# Patient Record
Sex: MALE | Race: WHITE | NOT HISPANIC OR LATINO
[De-identification: names, ages, dates, MRNs, and addresses within clinical notes are randomized per-mention and may not be internally consistent; named-entity substitution may affect disease eponyms.]

---

## 2017-01-26 ENCOUNTER — RESULT REVIEW (OUTPATIENT)
Age: 72
End: 2017-01-26

## 2017-03-02 ENCOUNTER — EMERGENCY (EMERGENCY)
Facility: HOSPITAL | Age: 72
LOS: 1 days | Discharge: HOME | End: 2017-03-02
Admitting: EMERGENCY MEDICINE
Payer: COMMERCIAL

## 2017-03-02 PROCEDURE — 71045 X-RAY EXAM CHEST 1 VIEW: CPT

## 2017-03-02 PROCEDURE — 85027 COMPLETE CBC AUTOMATED: CPT

## 2017-03-02 PROCEDURE — 84484 ASSAY OF TROPONIN QUANT: CPT

## 2017-03-02 PROCEDURE — 99284 EMERGENCY DEPT VISIT MOD MDM: CPT | Mod: 25

## 2017-03-02 PROCEDURE — 83880 ASSAY OF NATRIURETIC PEPTIDE: CPT

## 2017-03-02 PROCEDURE — 93005 ELECTROCARDIOGRAM TRACING: CPT

## 2017-03-02 PROCEDURE — 85610 PROTHROMBIN TIME: CPT

## 2017-03-02 PROCEDURE — 85730 THROMBOPLASTIN TIME PARTIAL: CPT

## 2017-03-02 PROCEDURE — 93010 ELECTROCARDIOGRAM REPORT: CPT

## 2017-03-02 PROCEDURE — 71010: CPT | Mod: 26

## 2017-03-02 PROCEDURE — 80053 COMPREHEN METABOLIC PANEL: CPT

## 2017-03-02 PROCEDURE — 82550 ASSAY OF CK (CPK): CPT

## 2017-03-02 PROCEDURE — 96360 HYDRATION IV INFUSION INIT: CPT

## 2017-03-02 PROCEDURE — 70450 CT HEAD/BRAIN W/O DYE: CPT | Mod: 26

## 2017-03-02 PROCEDURE — 70450 CT HEAD/BRAIN W/O DYE: CPT

## 2017-03-02 PROCEDURE — 99285 EMERGENCY DEPT VISIT HI MDM: CPT

## 2017-03-02 PROCEDURE — 84443 ASSAY THYROID STIM HORMONE: CPT

## 2018-09-07 ENCOUNTER — RESULT REVIEW (OUTPATIENT)
Age: 73
End: 2018-09-07

## 2018-10-24 ENCOUNTER — OUTPATIENT (OUTPATIENT)
Dept: OUTPATIENT SERVICES | Facility: HOSPITAL | Age: 73
LOS: 1 days | End: 2018-10-24
Payer: COMMERCIAL

## 2018-10-24 ENCOUNTER — APPOINTMENT (OUTPATIENT)
Dept: MRI IMAGING | Facility: CLINIC | Age: 73
End: 2018-10-24
Payer: COMMERCIAL

## 2018-10-24 DIAGNOSIS — M54.14 RADICULOPATHY, THORACIC REGION: ICD-10-CM

## 2018-10-24 PROCEDURE — 72146 MRI CHEST SPINE W/O DYE: CPT

## 2018-10-24 PROCEDURE — 72146 MRI CHEST SPINE W/O DYE: CPT | Mod: 26

## 2019-09-16 ENCOUNTER — RX RENEWAL (OUTPATIENT)
Age: 74
End: 2019-09-16

## 2020-01-09 ENCOUNTER — RX RENEWAL (OUTPATIENT)
Age: 75
End: 2020-01-09

## 2021-01-02 ENCOUNTER — RX RENEWAL (OUTPATIENT)
Age: 76
End: 2021-01-02

## 2021-01-25 ENCOUNTER — APPOINTMENT (OUTPATIENT)
Dept: GASTROENTEROLOGY | Facility: CLINIC | Age: 76
End: 2021-01-25
Payer: COMMERCIAL

## 2021-01-25 VITALS
HEART RATE: 97 BPM | HEIGHT: 69.5 IN | DIASTOLIC BLOOD PRESSURE: 81 MMHG | SYSTOLIC BLOOD PRESSURE: 141 MMHG | WEIGHT: 178 LBS | BODY MASS INDEX: 25.77 KG/M2

## 2021-01-25 DIAGNOSIS — Z80.9 FAMILY HISTORY OF MALIGNANT NEOPLASM, UNSPECIFIED: ICD-10-CM

## 2021-01-25 DIAGNOSIS — Z86.39 PERSONAL HISTORY OF OTHER ENDOCRINE, NUTRITIONAL AND METABOLIC DISEASE: ICD-10-CM

## 2021-01-25 DIAGNOSIS — K30 FUNCTIONAL DYSPEPSIA: ICD-10-CM

## 2021-01-25 DIAGNOSIS — Z82.49 FAMILY HISTORY OF ISCHEMIC HEART DISEASE AND OTHER DISEASES OF THE CIRCULATORY SYSTEM: ICD-10-CM

## 2021-01-25 PROCEDURE — 99215 OFFICE O/P EST HI 40 MIN: CPT

## 2021-01-25 PROCEDURE — 99072 ADDL SUPL MATRL&STAF TM PHE: CPT

## 2021-01-25 NOTE — PHYSICAL EXAM
[General Appearance - Alert] : alert [General Appearance - In No Acute Distress] : in no acute distress [Sclera] : the sclera and conjunctiva were normal [PERRL With Normal Accommodation] : pupils were equal in size, round, and reactive to light [Extraocular Movements] : extraocular movements were intact [Auscultation Breath Sounds / Voice Sounds] : lungs were clear to auscultation bilaterally [Heart Rate And Rhythm] : heart rate was normal and rhythm regular [Heart Sounds] : normal S1 and S2 [Heart Sounds Gallop] : no gallops [Murmurs] : no murmurs [Heart Sounds Pericardial Friction Rub] : no pericardial rub [Bowel Sounds] : normal bowel sounds [Abdomen Soft] : soft [] : no hepato-splenomegaly [Abdomen Mass (___ Cm)] : no abdominal mass palpated [FreeTextEntry1] : tender epigastrium [Abnormal Walk] : normal gait [Nail Clubbing] : no clubbing  or cyanosis of the fingernails [Musculoskeletal - Swelling] : no joint swelling seen [Motor Tone] : muscle strength and tone were normal [Oriented To Time, Place, And Person] : oriented to person, place, and time [Impaired Insight] : insight and judgment were intact [Affect] : the affect was normal

## 2021-01-25 NOTE — HISTORY OF PRESENT ILLNESS
[de-identified] : 74yo male with epigastric discomfort, belching\par \par He notes over last few weeks increased epigastric discomfort worse post-prandially with increased belching after nearly every meal.  Pain lasts for a while and is not severe, but definitely uncomfortable.  No specific triggers. Belching lasts for several minutes to half hour and then tends to resolve by itself.\par He takes Omeprazole daily but doesn't seem to help or prevent these symptoms

## 2021-01-25 NOTE — REVIEW OF SYSTEMS
[As Noted in HPI] : as noted in HPI [Abdominal Pain] : abdominal pain [Heartburn] : heartburn [Joint Pain] : joint pain [Negative] : Heme/Lymph

## 2021-01-28 DIAGNOSIS — Z01.818 ENCOUNTER FOR OTHER PREPROCEDURAL EXAMINATION: ICD-10-CM

## 2021-01-29 ENCOUNTER — APPOINTMENT (OUTPATIENT)
Dept: DISASTER EMERGENCY | Facility: CLINIC | Age: 76
End: 2021-01-29

## 2021-01-31 LAB — SARS-COV-2 N GENE NPH QL NAA+PROBE: NOT DETECTED

## 2021-02-01 LAB
ALBUMIN SERPL ELPH-MCNC: 4.3 G/DL
ALP BLD-CCNC: 81 U/L
ALT SERPL-CCNC: 19 U/L
AMYLASE/CREAT SERPL: 67 U/L
ANION GAP SERPL CALC-SCNC: 14 MMOL/L
AST SERPL-CCNC: 18 U/L
BASOPHILS # BLD AUTO: 0.08 K/UL
BASOPHILS NFR BLD AUTO: 1.2 %
BILIRUB SERPL-MCNC: 0.2 MG/DL
BUN SERPL-MCNC: 20 MG/DL
CALCIUM SERPL-MCNC: 9.1 MG/DL
CHLORIDE SERPL-SCNC: 103 MMOL/L
CO2 SERPL-SCNC: 22 MMOL/L
CREAT SERPL-MCNC: 1.33 MG/DL
CRP SERPL-MCNC: 0.46 MG/DL
EOSINOPHIL # BLD AUTO: 0.23 K/UL
EOSINOPHIL NFR BLD AUTO: 3.4 %
ERYTHROCYTE [SEDIMENTATION RATE] IN BLOOD BY WESTERGREN METHOD: 21 MM/HR
GLUCOSE SERPL-MCNC: 107 MG/DL
HCT VFR BLD CALC: 42.9 %
HGB BLD-MCNC: 13.5 G/DL
IMM GRANULOCYTES NFR BLD AUTO: 0.3 %
LPL SERPL-CCNC: 36 U/L
LYMPHOCYTES # BLD AUTO: 1.91 K/UL
LYMPHOCYTES NFR BLD AUTO: 27.8 %
MAN DIFF?: NORMAL
MCHC RBC-ENTMCNC: 27.1 PG
MCHC RBC-ENTMCNC: 31.5 GM/DL
MCV RBC AUTO: 86 FL
MONOCYTES # BLD AUTO: 0.59 K/UL
MONOCYTES NFR BLD AUTO: 8.6 %
NEUTROPHILS # BLD AUTO: 4.03 K/UL
NEUTROPHILS NFR BLD AUTO: 58.7 %
PLATELET # BLD AUTO: 252 K/UL
POTASSIUM SERPL-SCNC: 3.7 MMOL/L
PROT SERPL-MCNC: 6.6 G/DL
RBC # BLD: 4.99 M/UL
RBC # FLD: 15.3 %
SODIUM SERPL-SCNC: 139 MMOL/L
TSH SERPL-ACNC: 0.69 UIU/ML
WBC # FLD AUTO: 6.86 K/UL

## 2021-02-02 ENCOUNTER — RESULT REVIEW (OUTPATIENT)
Age: 76
End: 2021-02-02

## 2021-02-02 ENCOUNTER — APPOINTMENT (OUTPATIENT)
Dept: GASTROENTEROLOGY | Facility: AMBULATORY MEDICAL SERVICES | Age: 76
End: 2021-02-02
Payer: COMMERCIAL

## 2021-02-02 PROCEDURE — 43239 EGD BIOPSY SINGLE/MULTIPLE: CPT

## 2021-02-24 ENCOUNTER — APPOINTMENT (OUTPATIENT)
Dept: GASTROENTEROLOGY | Facility: CLINIC | Age: 76
End: 2021-02-24
Payer: COMMERCIAL

## 2021-02-24 VITALS
WEIGHT: 172 LBS | HEIGHT: 69.5 IN | BODY MASS INDEX: 24.9 KG/M2 | HEART RATE: 103 BPM | DIASTOLIC BLOOD PRESSURE: 84 MMHG | SYSTOLIC BLOOD PRESSURE: 138 MMHG

## 2021-02-24 PROCEDURE — 99072 ADDL SUPL MATRL&STAF TM PHE: CPT

## 2021-02-24 PROCEDURE — 99214 OFFICE O/P EST MOD 30 MIN: CPT

## 2021-02-28 NOTE — ASSESSMENT
[FreeTextEntry1] : 74yo male with bloating, belching and post-prandial epigastric pain\par \par will continue ppi\par \par s/p recent ct scan with negative egd\par Will try simethicone and iberogast empirically

## 2021-02-28 NOTE — REVIEW OF SYSTEMS
[As Noted in HPI] : as noted in HPI [Abdominal Pain] : abdominal pain [Negative] : Heme/Lymph [FreeTextEntry7] : belching

## 2021-02-28 NOTE — HISTORY OF PRESENT ILLNESS
[de-identified] : 76yo male f/u post-prandial abdominal pain\par He notes some post-prandial dyspepsia and bloating with belching. Pain can occur after nearly any meal and usually lasts several minutes in epigastrium.  He then notes some bloating and developed belching

## 2021-02-28 NOTE — PHYSICAL EXAM
[General Appearance - Alert] : alert [General Appearance - In No Acute Distress] : in no acute distress [Sclera] : the sclera and conjunctiva were normal [Auscultation Breath Sounds / Voice Sounds] : lungs were clear to auscultation bilaterally [Heart Rate And Rhythm] : heart rate was normal and rhythm regular [Heart Sounds] : normal S1 and S2 [Heart Sounds Gallop] : no gallops [Murmurs] : no murmurs [Heart Sounds Pericardial Friction Rub] : no pericardial rub [Bowel Sounds] : normal bowel sounds [Abdomen Soft] : soft [] : no hepato-splenomegaly [Abdomen Mass (___ Cm)] : no abdominal mass palpated [FreeTextEntry1] : tender epigastrium [Abnormal Walk] : normal gait [Nail Clubbing] : no clubbing  or cyanosis of the fingernails [Musculoskeletal - Swelling] : no joint swelling seen [Motor Tone] : muscle strength and tone were normal [Oriented To Time, Place, And Person] : oriented to person, place, and time [Impaired Insight] : insight and judgment were intact [Affect] : the affect was normal

## 2021-06-23 ENCOUNTER — FORM ENCOUNTER (OUTPATIENT)
Age: 76
End: 2021-06-23

## 2021-07-01 ENCOUNTER — RX RENEWAL (OUTPATIENT)
Age: 76
End: 2021-07-01

## 2021-07-21 ENCOUNTER — FORM ENCOUNTER (OUTPATIENT)
Age: 76
End: 2021-07-21

## 2021-07-29 ENCOUNTER — OUTPATIENT (OUTPATIENT)
Dept: OUTPATIENT SERVICES | Facility: HOSPITAL | Age: 76
LOS: 1 days | End: 2021-07-29
Payer: COMMERCIAL

## 2021-07-29 DIAGNOSIS — M25.552 PAIN IN LEFT HIP: ICD-10-CM

## 2021-07-29 PROCEDURE — 20610 DRAIN/INJ JOINT/BURSA W/O US: CPT | Mod: LT

## 2021-09-17 ENCOUNTER — OUTPATIENT (OUTPATIENT)
Dept: OUTPATIENT SERVICES | Facility: HOSPITAL | Age: 76
LOS: 1 days | End: 2021-09-17
Payer: COMMERCIAL

## 2021-09-17 DIAGNOSIS — Z20.828 CONTACT WITH AND (SUSPECTED) EXPOSURE TO OTHER VIRAL COMMUNICABLE DISEASES: ICD-10-CM

## 2021-09-17 LAB — SARS-COV-2 RNA SPEC QL NAA+PROBE: SIGNIFICANT CHANGE UP

## 2021-09-17 PROCEDURE — U0005: CPT

## 2021-09-17 PROCEDURE — U0003: CPT

## 2021-09-20 ENCOUNTER — OUTPATIENT (OUTPATIENT)
Dept: OUTPATIENT SERVICES | Facility: HOSPITAL | Age: 76
LOS: 1 days | End: 2021-09-20
Payer: COMMERCIAL

## 2021-09-20 DIAGNOSIS — M54.16 RADICULOPATHY, LUMBAR REGION: ICD-10-CM

## 2021-09-20 PROCEDURE — 62323 NJX INTERLAMINAR LMBR/SAC: CPT

## 2021-09-21 ENCOUNTER — APPOINTMENT (OUTPATIENT)
Dept: GASTROENTEROLOGY | Facility: CLINIC | Age: 76
End: 2021-09-21
Payer: COMMERCIAL

## 2021-09-21 VITALS
HEART RATE: 94 BPM | SYSTOLIC BLOOD PRESSURE: 161 MMHG | WEIGHT: 178 LBS | BODY MASS INDEX: 25.77 KG/M2 | DIASTOLIC BLOOD PRESSURE: 75 MMHG | HEIGHT: 69.5 IN

## 2021-09-21 DIAGNOSIS — Z86.010 PERSONAL HISTORY OF COLONIC POLYPS: ICD-10-CM

## 2021-09-21 PROCEDURE — 99214 OFFICE O/P EST MOD 30 MIN: CPT

## 2021-09-29 PROBLEM — Z86.010 PERSONAL HISTORY OF COLONIC POLYPS: Status: ACTIVE | Noted: 2021-09-29

## 2021-09-29 NOTE — PHYSICAL EXAM
[General Appearance - Alert] : alert [General Appearance - In No Acute Distress] : in no acute distress [Auscultation Breath Sounds / Voice Sounds] : lungs were clear to auscultation bilaterally [Heart Rate And Rhythm] : heart rate was normal and rhythm regular [Heart Sounds] : normal S1 and S2 [Heart Sounds Gallop] : no gallops [Murmurs] : no murmurs [Heart Sounds Pericardial Friction Rub] : no pericardial rub [Bowel Sounds] : normal bowel sounds [Abdomen Soft] : soft [] : no hepato-splenomegaly [Abdomen Mass (___ Cm)] : no abdominal mass palpated [FreeTextEntry1] : tender epigastric region [Abnormal Walk] : normal gait [Nail Clubbing] : no clubbing  or cyanosis of the fingernails [Musculoskeletal - Swelling] : no joint swelling seen [Motor Tone] : muscle strength and tone were normal [Oriented To Time, Place, And Person] : oriented to person, place, and time [Impaired Insight] : insight and judgment were intact [Affect] : the affect was normal

## 2021-09-29 NOTE — REVIEW OF SYSTEMS
[Abdominal Pain] : abdominal pain [Joint Stiffness] : joint stiffness [Negative] : Heme/Lymph [FreeTextEntry9] : back pain

## 2021-09-29 NOTE — HISTORY OF PRESENT ILLNESS
[de-identified] : 75yo male with epigastric pain, hx colon polyps\par \par He has developed increased epigastric pain post-prandially, despite otc PPI and pepcid\par Started several weeks ago and getting worse.  Also with hx colon polyps due for surveillance colonoscopy

## 2021-09-29 NOTE — ASSESSMENT
[FreeTextEntry1] : 75yo male with increasing epigastric pain, hx colon polyps\par \par Concerned for PUD\par Start Omerprazole 40mg daily\par Will check EGD\par \par Will check surveillance colonoscopy\par Risks and benefits of procedure(s) discussed with patient in detail, including but not limited to, perforation, bleeding, reaction to anesthesia, missed lesions.\par

## 2021-10-11 ENCOUNTER — LABORATORY RESULT (OUTPATIENT)
Age: 76
End: 2021-10-11

## 2021-10-15 ENCOUNTER — APPOINTMENT (OUTPATIENT)
Dept: GASTROENTEROLOGY | Facility: AMBULATORY MEDICAL SERVICES | Age: 76
End: 2021-10-15

## 2021-12-15 ENCOUNTER — RX RENEWAL (OUTPATIENT)
Age: 76
End: 2021-12-15

## 2021-12-22 ENCOUNTER — FORM ENCOUNTER (OUTPATIENT)
Age: 76
End: 2021-12-22

## 2022-01-12 ENCOUNTER — FORM ENCOUNTER (OUTPATIENT)
Age: 77
End: 2022-01-12

## 2022-01-24 ENCOUNTER — FORM ENCOUNTER (OUTPATIENT)
Age: 77
End: 2022-01-24

## 2022-02-01 ENCOUNTER — APPOINTMENT (OUTPATIENT)
Dept: GASTROENTEROLOGY | Facility: AMBULATORY MEDICAL SERVICES | Age: 77
End: 2022-02-01

## 2022-03-29 ENCOUNTER — RESULT REVIEW (OUTPATIENT)
Age: 77
End: 2022-03-29

## 2022-03-29 ENCOUNTER — APPOINTMENT (OUTPATIENT)
Dept: GASTROENTEROLOGY | Facility: AMBULATORY MEDICAL SERVICES | Age: 77
End: 2022-03-29
Payer: COMMERCIAL

## 2022-03-29 PROCEDURE — 43239 EGD BIOPSY SINGLE/MULTIPLE: CPT | Mod: 59

## 2022-03-29 PROCEDURE — 45380 COLONOSCOPY AND BIOPSY: CPT | Mod: 33

## 2022-06-14 ENCOUNTER — RX RENEWAL (OUTPATIENT)
Age: 77
End: 2022-06-14

## 2022-07-24 ENCOUNTER — FORM ENCOUNTER (OUTPATIENT)
Age: 77
End: 2022-07-24

## 2022-07-25 ENCOUNTER — FORM ENCOUNTER (OUTPATIENT)
Age: 77
End: 2022-07-25

## 2022-08-03 ENCOUNTER — FORM ENCOUNTER (OUTPATIENT)
Age: 77
End: 2022-08-03

## 2022-08-15 ENCOUNTER — FORM ENCOUNTER (OUTPATIENT)
Age: 77
End: 2022-08-15

## 2022-09-12 ENCOUNTER — APPOINTMENT (OUTPATIENT)
Dept: GASTROENTEROLOGY | Facility: CLINIC | Age: 77
End: 2022-09-12

## 2022-09-12 VITALS
DIASTOLIC BLOOD PRESSURE: 87 MMHG | BODY MASS INDEX: 25.48 KG/M2 | HEIGHT: 69.5 IN | WEIGHT: 176 LBS | SYSTOLIC BLOOD PRESSURE: 146 MMHG | HEART RATE: 79 BPM

## 2022-09-12 PROCEDURE — 99213 OFFICE O/P EST LOW 20 MIN: CPT

## 2022-09-25 NOTE — REVIEW OF SYSTEMS
[Abdominal Pain] : abdominal pain [Heartburn] : heartburn [Bloating (gassiness)] : bloating [Negative] : Heme/Lymph

## 2022-09-25 NOTE — HISTORY OF PRESENT ILLNESS
[FreeTextEntry1] : 76yo male with upper abdominal pain\par \par He has increased dyspepsia\par ?some dietary indiscretion and then developed worsening upper abdominal pain\par Now finally maybe a little better after weeks

## 2022-09-25 NOTE — PHYSICAL EXAM

## 2022-11-03 ENCOUNTER — RX CHANGE (OUTPATIENT)
Age: 77
End: 2022-11-03

## 2022-12-28 RX ORDER — INDAPAMIDE 1.25 MG
1 TABLET ORAL
Qty: 0 | Refills: 0 | DISCHARGE
Start: 2022-12-28 | End: 2023-01-03

## 2022-12-29 ENCOUNTER — NON-APPOINTMENT (OUTPATIENT)
Age: 77
End: 2022-12-29

## 2022-12-29 ENCOUNTER — APPOINTMENT (OUTPATIENT)
Dept: ORTHOPEDIC SURGERY | Facility: CLINIC | Age: 77
End: 2022-12-29

## 2022-12-29 VITALS
DIASTOLIC BLOOD PRESSURE: 77 MMHG | WEIGHT: 171 LBS | HEIGHT: 69 IN | HEART RATE: 96 BPM | BODY MASS INDEX: 25.33 KG/M2 | SYSTOLIC BLOOD PRESSURE: 125 MMHG

## 2022-12-29 DIAGNOSIS — M54.9 DORSALGIA, UNSPECIFIED: ICD-10-CM

## 2022-12-29 PROCEDURE — 99204 OFFICE O/P NEW MOD 45 MIN: CPT | Mod: 25

## 2022-12-29 PROCEDURE — 20610 DRAIN/INJ JOINT/BURSA W/O US: CPT | Mod: LT

## 2022-12-29 NOTE — PHYSICAL EXAM
[de-identified] : General Appearance: normal without acute distress\par Mental: Alert and oriented x 3\par Psych/affect: appropriate, cooperative\par Gait: Mildly antalgic gait\par \par Left hip: \par Mild pain with deep flexion and IR.  Minimal internal rotation\par Tender palpation over greater trochanteric bursa\par Straight leg raise: [Negative]\par Contralateral Hip: Decreased internal rotation, minimal pain\par Grossly normal motor and sensory examination of bilateral lower extremities\par \par Left knee: Normal alignment without subluxation. Full extension without lag. Flexion [past 110] degrees. [Minimal discomfort] on palpation of the joint line. No coronal or sagittal instability. No appreciable effusion.\par \par Right knee: Normal alignment without subluxation. Full extension without lag. Flexion [past 110] degrees. [Minimal discomfort] on palpation of the joint line. No coronal or sagittal instability. No appreciable effusion. [de-identified] : Outside images of AP pelvis reviewed interpreted– demonstrate degenerative joint disease of the hip with joint space narrowing, osteophyte formation, and subchondral sclerosis.  Left worse than right

## 2022-12-29 NOTE — HISTORY OF PRESENT ILLNESS
[de-identified] : Patient here for left more than right hip pain.  States been going on for years but it has been worse lately.  Does complain of difficulty putting his socks on especially the left side.  Describes the pain is mostly on the lateral aspect of his hip and down his thigh little bit.  Does have some groin pain.  Also complains of low back pain.  Denies pain radiating down his leg or numbness and tingling.  Occasionally takes Tylenol which does help a little bit.  States he did have a steroid injection to bilateral greater trochanteric bursa's 4 to 5 months ago which did provide relief.  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  Lives with his wife, only has stairs going into the basement.  Denies allergies, history of blood clots, takes an aspirin 81 daily, denies tobacco.  Past medical significant for GERD.

## 2022-12-29 NOTE — PROCEDURE
[de-identified] : Left greater trochanteric bursa injection - Steroid\par The risks, benefits, and alternatives of the injection were reviewed/discussed with the patient today in office and all of their questions were answered. The patient consented to the procedure. The point of maximal tenderness injection site was prepped with chloroprep.  Cold spray was utilized to numb the skin. Utilizing sterile technique, the bursa was injected with 1 ml 40 mg [methylprednisolone], 9 ml 1% Lidocaine. A sterile bandage was applied. The patient tolerated the procedure well and there were no complications.

## 2023-01-04 ENCOUNTER — INPATIENT (INPATIENT)
Facility: HOSPITAL | Age: 78
LOS: 3 days | Discharge: HOME | DRG: 641 | End: 2023-01-08
Attending: HOSPITALIST | Admitting: STUDENT IN AN ORGANIZED HEALTH CARE EDUCATION/TRAINING PROGRAM
Payer: COMMERCIAL

## 2023-01-04 VITALS — HEIGHT: 69 IN | WEIGHT: 169.98 LBS

## 2023-01-04 LAB
ALBUMIN SERPL ELPH-MCNC: 3.5 G/DL — SIGNIFICANT CHANGE UP (ref 3.3–5)
ALP SERPL-CCNC: 59 U/L — SIGNIFICANT CHANGE UP (ref 40–120)
ALT FLD-CCNC: 25 U/L — SIGNIFICANT CHANGE UP (ref 12–78)
ANION GAP SERPL CALC-SCNC: 5 MMOL/L — SIGNIFICANT CHANGE UP (ref 5–17)
AST SERPL-CCNC: 16 U/L — SIGNIFICANT CHANGE UP (ref 15–37)
BASE EXCESS BLDV CALC-SCNC: 2.4 MMOL/L — SIGNIFICANT CHANGE UP
BASOPHILS # BLD AUTO: 0.03 K/UL — SIGNIFICANT CHANGE UP (ref 0–0.2)
BASOPHILS NFR BLD AUTO: 0.3 % — SIGNIFICANT CHANGE UP (ref 0–2)
BILIRUB SERPL-MCNC: 0.6 MG/DL — SIGNIFICANT CHANGE UP (ref 0.2–1.2)
BUN SERPL-MCNC: 55 MG/DL — HIGH (ref 7–23)
CALCIUM SERPL-MCNC: 15.9 MG/DL — CRITICAL HIGH (ref 8.5–10.1)
CHLORIDE SERPL-SCNC: 104 MMOL/L — SIGNIFICANT CHANGE UP (ref 96–108)
CO2 BLDV-SCNC: 28 MMOL/L — HIGH (ref 22–26)
CO2 SERPL-SCNC: 30 MMOL/L — SIGNIFICANT CHANGE UP (ref 22–31)
CREAT SERPL-MCNC: 2.54 MG/DL — HIGH (ref 0.5–1.3)
EGFR: 26 ML/MIN/1.73M2 — LOW
EOSINOPHIL # BLD AUTO: 0.11 K/UL — SIGNIFICANT CHANGE UP (ref 0–0.5)
EOSINOPHIL NFR BLD AUTO: 1.3 % — SIGNIFICANT CHANGE UP (ref 0–6)
FLUAV AG NPH QL: SIGNIFICANT CHANGE UP
FLUBV AG NPH QL: SIGNIFICANT CHANGE UP
GAS PNL BLDV: SIGNIFICANT CHANGE UP
GLUCOSE SERPL-MCNC: 104 MG/DL — HIGH (ref 70–99)
HCO3 BLDV-SCNC: 26 MMOL/L — SIGNIFICANT CHANGE UP (ref 22–29)
HCT VFR BLD CALC: 40.2 % — SIGNIFICANT CHANGE UP (ref 39–50)
HGB BLD-MCNC: 13 G/DL — SIGNIFICANT CHANGE UP (ref 13–17)
IMM GRANULOCYTES NFR BLD AUTO: 0.3 % — SIGNIFICANT CHANGE UP (ref 0–0.9)
LIDOCAIN IGE QN: 80 U/L — SIGNIFICANT CHANGE UP (ref 73–393)
LYMPHOCYTES # BLD AUTO: 1.3 K/UL — SIGNIFICANT CHANGE UP (ref 1–3.3)
LYMPHOCYTES # BLD AUTO: 14.9 % — SIGNIFICANT CHANGE UP (ref 13–44)
MAGNESIUM SERPL-MCNC: 1.7 MG/DL — SIGNIFICANT CHANGE UP (ref 1.6–2.6)
MCHC RBC-ENTMCNC: 27.7 PG — SIGNIFICANT CHANGE UP (ref 27–34)
MCHC RBC-ENTMCNC: 32.3 GM/DL — SIGNIFICANT CHANGE UP (ref 32–36)
MCV RBC AUTO: 85.7 FL — SIGNIFICANT CHANGE UP (ref 80–100)
MONOCYTES # BLD AUTO: 0.69 K/UL — SIGNIFICANT CHANGE UP (ref 0–0.9)
MONOCYTES NFR BLD AUTO: 7.9 % — SIGNIFICANT CHANGE UP (ref 2–14)
NEUTROPHILS # BLD AUTO: 6.56 K/UL — SIGNIFICANT CHANGE UP (ref 1.8–7.4)
NEUTROPHILS NFR BLD AUTO: 75.3 % — SIGNIFICANT CHANGE UP (ref 43–77)
NT-PROBNP SERPL-SCNC: 64 PG/ML — SIGNIFICANT CHANGE UP (ref 0–450)
PCO2 BLDV: 38 MMHG — LOW (ref 42–55)
PH BLDV: 7.45 — HIGH (ref 7.32–7.43)
PHOSPHATE SERPL-MCNC: 2.7 MG/DL — SIGNIFICANT CHANGE UP (ref 2.5–4.5)
PLATELET # BLD AUTO: 278 K/UL — SIGNIFICANT CHANGE UP (ref 150–400)
PO2 BLDV: 134 MMHG — SIGNIFICANT CHANGE UP
POTASSIUM SERPL-MCNC: 3.4 MMOL/L — LOW (ref 3.5–5.3)
POTASSIUM SERPL-SCNC: 3.4 MMOL/L — LOW (ref 3.5–5.3)
PROT SERPL-MCNC: 6.9 GM/DL — SIGNIFICANT CHANGE UP (ref 6–8.3)
RBC # BLD: 4.69 M/UL — SIGNIFICANT CHANGE UP (ref 4.2–5.8)
RBC # FLD: 14.6 % — HIGH (ref 10.3–14.5)
RSV RNA NPH QL NAA+NON-PROBE: SIGNIFICANT CHANGE UP
SAO2 % BLDV: 99.5 % — SIGNIFICANT CHANGE UP
SARS-COV-2 RNA SPEC QL NAA+PROBE: SIGNIFICANT CHANGE UP
SODIUM SERPL-SCNC: 139 MMOL/L — SIGNIFICANT CHANGE UP (ref 135–145)
TROPONIN I, HIGH SENSITIVITY RESULT: 35.77 NG/L — SIGNIFICANT CHANGE UP
WBC # BLD: 8.72 K/UL — SIGNIFICANT CHANGE UP (ref 3.8–10.5)
WBC # FLD AUTO: 8.72 K/UL — SIGNIFICANT CHANGE UP (ref 3.8–10.5)

## 2023-01-04 PROCEDURE — 99291 CRITICAL CARE FIRST HOUR: CPT

## 2023-01-04 PROCEDURE — 93010 ELECTROCARDIOGRAM REPORT: CPT

## 2023-01-04 PROCEDURE — 71045 X-RAY EXAM CHEST 1 VIEW: CPT | Mod: 26

## 2023-01-04 RX ORDER — SODIUM CHLORIDE 9 MG/ML
2000 INJECTION INTRAMUSCULAR; INTRAVENOUS; SUBCUTANEOUS ONCE
Refills: 0 | Status: COMPLETED | OUTPATIENT
Start: 2023-01-04 | End: 2023-01-04

## 2023-01-04 RX ORDER — CALCITONIN SALMON 200 [IU]/ML
310 INJECTION, SOLUTION INTRAMUSCULAR EVERY 12 HOURS
Refills: 0 | Status: COMPLETED | OUTPATIENT
Start: 2023-01-04 | End: 2023-01-05

## 2023-01-04 RX ADMIN — SODIUM CHLORIDE 2000 MILLILITER(S): 9 INJECTION INTRAMUSCULAR; INTRAVENOUS; SUBCUTANEOUS at 19:41

## 2023-01-04 NOTE — ED PROVIDER NOTE - NSICDXPASTMEDICALHX_GEN_ALL_CORE_FT
PAST MEDICAL HISTORY:  HTN (hypertension)      PAST MEDICAL HISTORY:  Hyperlipidemia     Hypertension       HTN (hypertension)

## 2023-01-04 NOTE — ED ADULT NURSE NOTE - OBJECTIVE STATEMENT
Pt is a 77YOM who is here for an abnormal lab result, pt states that he had blood drawn earlier today and was told to go to the Emergency Department for a calcium of 16.3, pt states that he went to the doctor for aches and pains in his leg, 2 days ago he had nausea with vomiting, pt denies any fevers, chills, abdominal pain, chest pain, difficulty breathing, shortness of breath, pt is A&O4, no complaints of pain at this time, pt states he feels like he has a cramping in his legs, denies any dizziness, denies any headache.

## 2023-01-04 NOTE — ED PROVIDER NOTE - CLINICAL SUMMARY MEDICAL DECISION MAKING FREE TEXT BOX
Pt sent in for worsening Kidney function by PCP and calcium of 16, sx of generalized weakness constipation and shin pain. vitals are normal, exam is unremarkable. Admit to medicine for workup

## 2023-01-04 NOTE — ED PROVIDER NOTE - OBJECTIVE STATEMENT
78 yo male wPMHx of HTN presents to the ED sent in by MD for hypercalcemia (16.3). Pt is able to walk but states that he is feeling weak and lost his appetite. Pt experiencing chills, vomiting for the past two days, and constipation. Pt states that he is feeling pain in the shins. Primary care discontinued his water pill today (indapamide). Denies chest pain, SOB, fevers.

## 2023-01-04 NOTE — PHARMACOTHERAPY INTERVENTION NOTE - COMMENTS
Medication reconciliation completed.  Reviewed Medication list and confirmed med allergies with patient; confirmed with Dr. First Medellie.

## 2023-01-05 DIAGNOSIS — E83.52 HYPERCALCEMIA: ICD-10-CM

## 2023-01-05 LAB
24R-OH-CALCIDIOL SERPL-MCNC: 64.9 NG/ML — SIGNIFICANT CHANGE UP (ref 30–80)
ALBUMIN SERPL ELPH-MCNC: 3.3 G/DL — SIGNIFICANT CHANGE UP (ref 3.3–5)
ALP SERPL-CCNC: 55 U/L — SIGNIFICANT CHANGE UP (ref 40–120)
ALT FLD-CCNC: 23 U/L — SIGNIFICANT CHANGE UP (ref 12–78)
ANION GAP SERPL CALC-SCNC: 4 MMOL/L — LOW (ref 5–17)
ANION GAP SERPL CALC-SCNC: 5 MMOL/L — SIGNIFICANT CHANGE UP (ref 5–17)
APPEARANCE UR: CLEAR — SIGNIFICANT CHANGE UP
AST SERPL-CCNC: 18 U/L — SIGNIFICANT CHANGE UP (ref 15–37)
BASOPHILS # BLD AUTO: 0.03 K/UL — SIGNIFICANT CHANGE UP (ref 0–0.2)
BASOPHILS NFR BLD AUTO: 0.3 % — SIGNIFICANT CHANGE UP (ref 0–2)
BILIRUB SERPL-MCNC: 0.6 MG/DL — SIGNIFICANT CHANGE UP (ref 0.2–1.2)
BILIRUB UR-MCNC: NEGATIVE — SIGNIFICANT CHANGE UP
BUN SERPL-MCNC: 50 MG/DL — HIGH (ref 7–23)
BUN SERPL-MCNC: 55 MG/DL — HIGH (ref 7–23)
CALCIUM SERPL-MCNC: 13.6 MG/DL — CRITICAL HIGH (ref 8.5–10.1)
CALCIUM SERPL-MCNC: 14.5 MG/DL — CRITICAL HIGH (ref 8.5–10.1)
CALCIUM UR-MCNC: 18 MG/DL — SIGNIFICANT CHANGE UP
CHLORIDE SERPL-SCNC: 111 MMOL/L — HIGH (ref 96–108)
CHLORIDE SERPL-SCNC: 113 MMOL/L — HIGH (ref 96–108)
CO2 SERPL-SCNC: 25 MMOL/L — SIGNIFICANT CHANGE UP (ref 22–31)
CO2 SERPL-SCNC: 26 MMOL/L — SIGNIFICANT CHANGE UP (ref 22–31)
COLOR SPEC: YELLOW — SIGNIFICANT CHANGE UP
CREAT ?TM UR-MCNC: 60.4 MG/DL — SIGNIFICANT CHANGE UP
CREAT SERPL-MCNC: 2.36 MG/DL — HIGH (ref 0.5–1.3)
CREAT SERPL-MCNC: 2.51 MG/DL — HIGH (ref 0.5–1.3)
DIFF PNL FLD: ABNORMAL
EGFR: 26 ML/MIN/1.73M2 — LOW
EGFR: 28 ML/MIN/1.73M2 — LOW
EOSINOPHIL # BLD AUTO: 0.05 K/UL — SIGNIFICANT CHANGE UP (ref 0–0.5)
EOSINOPHIL NFR BLD AUTO: 0.4 % — SIGNIFICANT CHANGE UP (ref 0–6)
GLUCOSE SERPL-MCNC: 102 MG/DL — HIGH (ref 70–99)
GLUCOSE SERPL-MCNC: 113 MG/DL — HIGH (ref 70–99)
GLUCOSE UR QL: NEGATIVE — SIGNIFICANT CHANGE UP
HCT VFR BLD CALC: 35 % — LOW (ref 39–50)
HCV AB S/CO SERPL IA: 0.39 S/CO — SIGNIFICANT CHANGE UP (ref 0–0.99)
HCV AB SERPL-IMP: SIGNIFICANT CHANGE UP
HGB BLD-MCNC: 11.3 G/DL — LOW (ref 13–17)
IMM GRANULOCYTES NFR BLD AUTO: 0.3 % — SIGNIFICANT CHANGE UP (ref 0–0.9)
KETONES UR-MCNC: NEGATIVE — SIGNIFICANT CHANGE UP
LEUKOCYTE ESTERASE UR-ACNC: NEGATIVE — SIGNIFICANT CHANGE UP
LYMPHOCYTES # BLD AUTO: 1.12 K/UL — SIGNIFICANT CHANGE UP (ref 1–3.3)
LYMPHOCYTES # BLD AUTO: 9.5 % — LOW (ref 13–44)
MCHC RBC-ENTMCNC: 27.8 PG — SIGNIFICANT CHANGE UP (ref 27–34)
MCHC RBC-ENTMCNC: 32.3 GM/DL — SIGNIFICANT CHANGE UP (ref 32–36)
MCV RBC AUTO: 86 FL — SIGNIFICANT CHANGE UP (ref 80–100)
MONOCYTES # BLD AUTO: 0.78 K/UL — SIGNIFICANT CHANGE UP (ref 0–0.9)
MONOCYTES NFR BLD AUTO: 6.6 % — SIGNIFICANT CHANGE UP (ref 2–14)
NEUTROPHILS # BLD AUTO: 9.71 K/UL — HIGH (ref 1.8–7.4)
NEUTROPHILS NFR BLD AUTO: 82.9 % — HIGH (ref 43–77)
NITRITE UR-MCNC: NEGATIVE — SIGNIFICANT CHANGE UP
OSMOLALITY UR: 385 MOSM/KG — SIGNIFICANT CHANGE UP (ref 50–1200)
PH UR: 5 — SIGNIFICANT CHANGE UP (ref 5–8)
PLATELET # BLD AUTO: 242 K/UL — SIGNIFICANT CHANGE UP (ref 150–400)
POTASSIUM SERPL-MCNC: 3.6 MMOL/L — SIGNIFICANT CHANGE UP (ref 3.5–5.3)
POTASSIUM SERPL-MCNC: 3.7 MMOL/L — SIGNIFICANT CHANGE UP (ref 3.5–5.3)
POTASSIUM SERPL-SCNC: 3.6 MMOL/L — SIGNIFICANT CHANGE UP (ref 3.5–5.3)
POTASSIUM SERPL-SCNC: 3.7 MMOL/L — SIGNIFICANT CHANGE UP (ref 3.5–5.3)
POTASSIUM UR-SCNC: 19.3 MMOL/L — SIGNIFICANT CHANGE UP
PROT ?TM UR-MCNC: 14 MG/DL — HIGH (ref 0–12)
PROT SERPL-MCNC: 5.7 G/DL — LOW (ref 6–8.3)
PROT SERPL-MCNC: 5.7 G/DL — LOW (ref 6–8.3)
PROT SERPL-MCNC: 6.3 GM/DL — SIGNIFICANT CHANGE UP (ref 6–8.3)
PROT UR-MCNC: NEGATIVE — SIGNIFICANT CHANGE UP
PROT/CREAT UR-RTO: 0.2 RATIO — SIGNIFICANT CHANGE UP (ref 0–0.2)
PSA FLD-MCNC: 8.41 NG/ML — HIGH (ref 0–4)
PSA SERPL-MCNC: 8.41 NG/ML — HIGH (ref 0–4)
PTH-INTACT FLD-MCNC: 481 PG/ML — HIGH (ref 15–65)
RBC # BLD: 4.07 M/UL — LOW (ref 4.2–5.8)
RBC # FLD: 14.6 % — HIGH (ref 10.3–14.5)
SODIUM SERPL-SCNC: 141 MMOL/L — SIGNIFICANT CHANGE UP (ref 135–145)
SODIUM SERPL-SCNC: 143 MMOL/L — SIGNIFICANT CHANGE UP (ref 135–145)
SODIUM UR-SCNC: 87 MMOL/L — SIGNIFICANT CHANGE UP
SP GR SPEC: 1.02 — SIGNIFICANT CHANGE UP (ref 1.01–1.02)
T3 SERPL-MCNC: 89 NG/DL — SIGNIFICANT CHANGE UP (ref 80–200)
T4 AB SER-ACNC: 5.6 UG/DL — SIGNIFICANT CHANGE UP (ref 4.6–12)
TSH SERPL-MCNC: 0.22 UU/ML — LOW (ref 0.34–4.82)
UROBILINOGEN FLD QL: NEGATIVE — SIGNIFICANT CHANGE UP
UUN UR-MCNC: 441 MG/DL — SIGNIFICANT CHANGE UP
WBC # BLD: 11.73 K/UL — HIGH (ref 3.8–10.5)
WBC # FLD AUTO: 11.73 K/UL — HIGH (ref 3.8–10.5)

## 2023-01-05 PROCEDURE — 74176 CT ABD & PELVIS W/O CONTRAST: CPT

## 2023-01-05 PROCEDURE — 84133 ASSAY OF URINE POTASSIUM: CPT

## 2023-01-05 PROCEDURE — 85027 COMPLETE CBC AUTOMATED: CPT

## 2023-01-05 PROCEDURE — 76770 US EXAM ABDO BACK WALL COMP: CPT | Mod: 26

## 2023-01-05 PROCEDURE — 84436 ASSAY OF TOTAL THYROXINE: CPT

## 2023-01-05 PROCEDURE — 76770 US EXAM ABDO BACK WALL COMP: CPT

## 2023-01-05 PROCEDURE — 80048 BASIC METABOLIC PNL TOTAL CA: CPT

## 2023-01-05 PROCEDURE — 84100 ASSAY OF PHOSPHORUS: CPT

## 2023-01-05 PROCEDURE — 82570 ASSAY OF URINE CREATININE: CPT

## 2023-01-05 PROCEDURE — 84300 ASSAY OF URINE SODIUM: CPT

## 2023-01-05 PROCEDURE — 82340 ASSAY OF CALCIUM IN URINE: CPT

## 2023-01-05 PROCEDURE — G0103: CPT

## 2023-01-05 PROCEDURE — 99223 1ST HOSP IP/OBS HIGH 75: CPT

## 2023-01-05 PROCEDURE — 84165 PROTEIN E-PHORESIS SERUM: CPT

## 2023-01-05 PROCEDURE — 83970 ASSAY OF PARATHORMONE: CPT

## 2023-01-05 PROCEDURE — 86803 HEPATITIS C AB TEST: CPT

## 2023-01-05 PROCEDURE — 82310 ASSAY OF CALCIUM: CPT

## 2023-01-05 PROCEDURE — 81001 URINALYSIS AUTO W/SCOPE: CPT

## 2023-01-05 PROCEDURE — 80053 COMPREHEN METABOLIC PANEL: CPT

## 2023-01-05 PROCEDURE — 85025 COMPLETE CBC W/AUTO DIFF WBC: CPT

## 2023-01-05 PROCEDURE — 12345: CPT | Mod: NC,GC

## 2023-01-05 PROCEDURE — 83935 ASSAY OF URINE OSMOLALITY: CPT

## 2023-01-05 PROCEDURE — 84156 ASSAY OF PROTEIN URINE: CPT

## 2023-01-05 PROCEDURE — 84443 ASSAY THYROID STIM HORMONE: CPT

## 2023-01-05 PROCEDURE — 84155 ASSAY OF PROTEIN SERUM: CPT

## 2023-01-05 PROCEDURE — 84480 ASSAY TRIIODOTHYRONINE (T3): CPT

## 2023-01-05 PROCEDURE — 74176 CT ABD & PELVIS W/O CONTRAST: CPT | Mod: 26

## 2023-01-05 PROCEDURE — 84540 ASSAY OF URINE/UREA-N: CPT

## 2023-01-05 PROCEDURE — 36415 COLL VENOUS BLD VENIPUNCTURE: CPT

## 2023-01-05 PROCEDURE — 83519 RIA NONANTIBODY: CPT

## 2023-01-05 PROCEDURE — 84153 ASSAY OF PSA TOTAL: CPT

## 2023-01-05 PROCEDURE — 76536 US EXAM OF HEAD AND NECK: CPT

## 2023-01-05 PROCEDURE — 82306 VITAMIN D 25 HYDROXY: CPT

## 2023-01-05 RX ORDER — CALCITONIN SALMON 200 [IU]/ML
310 INJECTION, SOLUTION INTRAMUSCULAR EVERY 12 HOURS
Refills: 0 | Status: COMPLETED | OUTPATIENT
Start: 2023-01-05 | End: 2023-01-06

## 2023-01-05 RX ORDER — OMEPRAZOLE 10 MG/1
1 CAPSULE, DELAYED RELEASE ORAL
Qty: 0 | Refills: 0 | DISCHARGE

## 2023-01-05 RX ORDER — SODIUM CHLORIDE 9 MG/ML
1000 INJECTION INTRAMUSCULAR; INTRAVENOUS; SUBCUTANEOUS
Refills: 0 | Status: DISCONTINUED | OUTPATIENT
Start: 2023-01-05 | End: 2023-01-06

## 2023-01-05 RX ORDER — CHOLECALCIFEROL (VITAMIN D3) 125 MCG
1 CAPSULE ORAL
Qty: 0 | Refills: 0 | DISCHARGE

## 2023-01-05 RX ORDER — ONDANSETRON 8 MG/1
4 TABLET, FILM COATED ORAL ONCE
Refills: 0 | Status: COMPLETED | OUTPATIENT
Start: 2023-01-05 | End: 2023-01-05

## 2023-01-05 RX ORDER — ONDANSETRON 8 MG/1
4 TABLET, FILM COATED ORAL EVERY 8 HOURS
Refills: 0 | Status: DISCONTINUED | OUTPATIENT
Start: 2023-01-05 | End: 2023-01-08

## 2023-01-05 RX ORDER — ASPIRIN/CALCIUM CARB/MAGNESIUM 324 MG
81 TABLET ORAL DAILY
Refills: 0 | Status: DISCONTINUED | OUTPATIENT
Start: 2023-01-05 | End: 2023-01-08

## 2023-01-05 RX ORDER — LANOLIN ALCOHOL/MO/W.PET/CERES
3 CREAM (GRAM) TOPICAL AT BEDTIME
Refills: 0 | Status: DISCONTINUED | OUTPATIENT
Start: 2023-01-05 | End: 2023-01-08

## 2023-01-05 RX ORDER — LISINOPRIL 2.5 MG/1
1 TABLET ORAL
Qty: 0 | Refills: 0 | DISCHARGE

## 2023-01-05 RX ORDER — ACETAMINOPHEN 500 MG
650 TABLET ORAL EVERY 6 HOURS
Refills: 0 | Status: DISCONTINUED | OUTPATIENT
Start: 2023-01-05 | End: 2023-01-08

## 2023-01-05 RX ORDER — DOXAZOSIN MESYLATE 4 MG
4 TABLET ORAL AT BEDTIME
Refills: 0 | Status: DISCONTINUED | OUTPATIENT
Start: 2023-01-05 | End: 2023-01-08

## 2023-01-05 RX ADMIN — SODIUM CHLORIDE 125 MILLILITER(S): 9 INJECTION INTRAMUSCULAR; INTRAVENOUS; SUBCUTANEOUS at 12:41

## 2023-01-05 RX ADMIN — SODIUM CHLORIDE 125 MILLILITER(S): 9 INJECTION INTRAMUSCULAR; INTRAVENOUS; SUBCUTANEOUS at 21:00

## 2023-01-05 RX ADMIN — Medication 4 MILLIGRAM(S): at 21:01

## 2023-01-05 RX ADMIN — CALCITONIN SALMON 310 INTERNATIONAL UNIT(S): 200 INJECTION, SOLUTION INTRAMUSCULAR at 01:15

## 2023-01-05 RX ADMIN — ONDANSETRON 4 MILLIGRAM(S): 8 TABLET, FILM COATED ORAL at 14:46

## 2023-01-05 RX ADMIN — Medication 650 MILLIGRAM(S): at 15:08

## 2023-01-05 RX ADMIN — Medication 81 MILLIGRAM(S): at 11:42

## 2023-01-05 RX ADMIN — SODIUM CHLORIDE 125 MILLILITER(S): 9 INJECTION INTRAMUSCULAR; INTRAVENOUS; SUBCUTANEOUS at 02:19

## 2023-01-05 RX ADMIN — ONDANSETRON 4 MILLIGRAM(S): 8 TABLET, FILM COATED ORAL at 02:38

## 2023-01-05 RX ADMIN — Medication 650 MILLIGRAM(S): at 14:47

## 2023-01-05 RX ADMIN — Medication 50 MILLIGRAM(S): at 12:29

## 2023-01-05 RX ADMIN — CALCITONIN SALMON 310 INTERNATIONAL UNIT(S): 200 INJECTION, SOLUTION INTRAMUSCULAR at 21:01

## 2023-01-05 RX ADMIN — CALCITONIN SALMON 310 INTERNATIONAL UNIT(S): 200 INJECTION, SOLUTION INTRAMUSCULAR at 12:30

## 2023-01-05 RX ADMIN — ONDANSETRON 4 MILLIGRAM(S): 8 TABLET, FILM COATED ORAL at 21:03

## 2023-01-05 NOTE — CONSULT NOTE ADULT - ASSESSMENT
78 yo male with PMHx HTN, HLD, BPH, OCD and Hypothyroidism presents today to [facility] sent by his PCP for hypercalcemia.  Patient mentions that for the past couple of days he has had no apettite and he has been constipated, with pain on both knees and feeling really weak on both legs so he decided to go ot his PCP and was told to stop taking his diuretic and to come to the ED.  Patient denies any chest pain, palpitaitons, SOB, lightheadedness, dizziness, fever, chills,     In the ED VSS,  Calcium was 15.9, Cr 2.54, BUN 55, GFR 26 and potassium 3.4.    EKG showed:  Patient was given 2L NS and Calcitonin injection.      Nephrology:  Above information obtained from chart  75 yo m h/o prostate Ca, HLD. CKD, elevated PTH.  Pt presented with  and Ca 15.9, on calcitonin and IVF  Case d/w Pts wife and daughter  Pt has been feeling weak recently, white spots in front of eyes  EGFR 26    A/P  Evaluate for parathyroid adenoma, less likely carcinoma  The Intraoperative IPTH in chart most likely in error, and is an outpt iPTH level  pt never had a parathyroid surgery  PTH and Ca too elevated for primary hyperparathyroidsm  Will cont w Calcitonin q 6 h for total of 4 doses  IVF  Stat BMP tonight  US thyroid/ parathyroid   evaluate for bisphosphonate slow IV infusion if needed  if calcium does not improve

## 2023-01-05 NOTE — H&P ADULT - ATTENDING COMMENTS
76 yo male with PMH of HTN, HLD, ERIC, hyperthyroidism sent to ED from PCPs office for hypercalcemia. Pt states for the past one week he has been feeling unwell. complains of fatigue, decreased appetite and leg pain. He has blood work which showed CASPER and hypercalcemia so he was sent to the ED for eval.     #Hypercalcemia  - admit to med-surg  - s/p calcitonin in the ED  - continue with IV fluids  - hold indapamide  - check TSH, PTH, PTHrP, vitamin D, spep, UA, urine lytes  - nephro colnsult    #CASPER  - IV fluids  - hold indapamide and lisinopril  - renal US  - nephro eval    #hyperthyroidism  - check TFTs  - continue methimazole    #HTN  - monitor  - adjust meds as needed

## 2023-01-05 NOTE — H&P ADULT - HISTORY OF PRESENT ILLNESS
78 yo male with PMHx HTN, HLD, BPH, OCD and Hypothyroidism 76 yo male with PMHx HTN, HLD, BPH, OCD and Hypothyroidism presents today to [facility] sent by his PCP for hypercalcemia.  Patient mentions that for the past couple of days he has had no apettite and he has been constipated, with pain on both knees and feeling really weak on both legs so he decided to go ot his PCP and was told to stop taking his diuretic and to come to the ED.  Patient denies any chest pain, palpitaitons, SOB, lightheadedness, dizziness, fever, chills,     In the ED VSS,  Calcium was 15.9, Cr 2.54, BUN 55, GFR 26 and potassium 3.4.    EKG showed:  Patient was given 2L NS and Calcitonin injection.

## 2023-01-05 NOTE — CONSULT NOTE ADULT - SUBJECTIVE AND OBJECTIVE BOX
NEPHROLOGY CONSULT  HPI:  78 yo male with PMHx HTN, HLD, BPH, OCD and Hypothyroidism presents today to [facility] sent by his PCP for hypercalcemia.  Patient mentions that for the past couple of days he has had no apettite and he has been constipated, with pain on both knees and feeling really weak on both legs so he decided to go ot his PCP and was told to stop taking his diuretic and to come to the ED.  Patient denies any chest pain, palpitaitons, SOB, lightheadedness, dizziness, fever, chills,     In the ED VSS,  Calcium was 15.9, Cr 2.54, BUN 55, GFR 26 and potassium 3.4.    EKG showed:  Patient was given 2L NS and Calcitonin injection.      Nephrology:  Above information obtained from chart  75 yo m h/o prostate Ca, HLD. CKD, elevated PTH.  Pt presented with  and Ca 15.9, on calcitonin and IVF  Case d/w Pts wife and daughter  Pt has been feeling weak recently, white spots in front of eyes  EGFR 26      PAST MEDICAL & SURGICAL HISTORY:  Hypertension      Hyperlipidemia      HTN (hypertension)          FAMILY HISTORY:      MEDICATIONS  (STANDING):  aspirin enteric coated 81 milliGRAM(s) Oral daily  calcitonin Injectable 310 International Unit(s) IntraMuscular every 12 hours  doxazosin 4 milliGRAM(s) Oral at bedtime  methimazole 5 milliGRAM(s) Oral daily  PARoxetine 50 milliGRAM(s) Oral daily  sodium chloride 0.9%. 1000 milliLiter(s) (125 mL/Hr) IV Continuous <Continuous>  sodium chloride 0.9%. 1000 milliLiter(s) (125 mL/Hr) IV Continuous <Continuous>  sodium chloride 0.9%. 1000 milliLiter(s) (125 mL/Hr) IV Continuous <Continuous>    MEDICATIONS  (PRN):  acetaminophen     Tablet .. 650 milliGRAM(s) Oral every 6 hours PRN Mild Pain (1 - 3)  aluminum hydroxide/magnesium hydroxide/simethicone Suspension 30 milliLiter(s) Oral every 4 hours PRN Dyspepsia  melatonin 3 milliGRAM(s) Oral at bedtime PRN Insomnia  ondansetron Injectable 4 milliGRAM(s) IV Push every 8 hours PRN Nausea and/or Vomiting      Allergies    No Known Allergies    Intolerances        I&O's Summary        REVIEW OF SYSTEMS:    Weakness  Otherwise Neg      Vital Signs Last 24 Hrs  T(C): 37 (2023 16:00), Max: 37 (2023 16:00)  T(F): 98.6 (2023 16:00), Max: 98.6 (2023 16:00)  HR: 72 (2023 16:00) (71 - 77)  BP: 157/67 (2023 16:00) (142/82 - 171/85)  BP(mean): 107 (2023 05:51) (100 - 139)  RR: 18 (2023 16:00) (18 - 19)  SpO2: 100% (2023 16:00) (97% - 100%)    Parameters below as of 2023 16:00  Patient On (Oxygen Delivery Method): room air      I&O's Detail     ml      PHYSICAL EXAM:    General:  Alert, No acute distress.    Neuro:  Alert and oriented to person, place, and time. Able to communicate  well.      HEENT:  No JVD, no masses, Eyes anicteric, ,    Cardiovascular:  Regular rate and rhythm, with normal S1 and S2.    Lungs:  clear. no rales, no wheezing, .    Abdomen:  Normoactive bowel sounds. Soft, flat, non-tender, and non-distended.  positive bowel sounds    Skin:  Warm, dry    Extremities:  warm,  no cyanosis    LABS:                        11.3   11.73 )-----------( 242      ( 2023 06:04 )             35.0     01-05    141  |  111<H>  |  55<H>  ----------------------------<  113<H>  3.6   |  25  |  2.51<H>    Ca    14.5<HH>      2023 06:04  Phos  2.7     01-04  Mg     1.7     01-04    TPro  6.3  /  Alb  3.3  /  TBili  0.6  /  DBili  x   /  AST  18  /  ALT  23  /  AlkPhos  55  01-05      Urinalysis Basic - ( 2023 08:00 )    Color: Yellow / Appearance: Clear / S.020 / pH: x  Gluc: x / Ketone: Negative  / Bili: Negative / Urobili: Negative   Blood: x / Protein: Negative / Nitrite: Negative   Leuk Esterase: Negative / RBC: 6-10 /HPF / WBC Negative /HPF   Sq Epi: x / Non Sq Epi: Negative / Bacteria: Negative      Intact PTH: 481 pg/mL ( @ 06:04)  Vitamin D, 25-Hydroxy: 64.9 ng/mL ( @ 06:04)

## 2023-01-05 NOTE — H&P ADULT - NSHPREVIEWOFSYSTEMS_GEN_ALL_CORE
CONSTITUTIONAL: Mild weakness, No fevers or chills  EYES/ENT: No visual changes;  No vertigo or throat pain   NECK: No pain or stiffness  RESPIRATORY: No cough, wheezing, hemoptysis; No shortness of breath  CARDIOVASCULAR: No chest pain or palpitations  GASTROINTESTINAL: No abdominal or epigastric pain. No nausea, vomiting, or hematemesis; +constipation. No melena or hematochezia.  GENITOURINARY: No dysuria, frequency or hematuria  NEUROLOGICAL: No numbness or weakness  MSK: b/l lower extremity weakness and pain  SKIN: No itching, rashes

## 2023-01-05 NOTE — H&P ADULT - NSHPPHYSICALEXAM_GEN_ALL_CORE
Vital Signs Last 24 Hrs  T(C): 36.6 (04 Jan 2023 23:38), Max: 36.7 (04 Jan 2023 16:26)  T(F): 97.9 (04 Jan 2023 23:38), Max: 98.1 (04 Jan 2023 16:26)  HR: 75 (04 Jan 2023 23:38) (71 - 84)  BP: 142/82 (04 Jan 2023 23:38) (138/89 - 155/78)  BP(mean): 100 (04 Jan 2023 23:38) (100 - 104)  RR: 18 (04 Jan 2023 23:38) (18 - 19)  SpO2: 99% (04 Jan 2023 23:38) (97% - 100%)    Parameters below as of 04 Jan 2023 23:38  Patient On (Oxygen Delivery Method): room air

## 2023-01-05 NOTE — H&P ADULT - ASSESSMENT
76 yo male with PMHx HTN, HLD, BPH, OCD and Hyperthyroidism presents today to [facility] sent by his PCP for hypercalcemia.     #Severe Hypercalcemia  - Admit ot MedSurg with remote Tele  - Calcium on admission 15.9, corrected Ca 16.3  - s/p 2L IVF and Calcitonin 310 UI in the ED  - Will continue IVF at 125cc/hr  - Continue Calcitonin 4UI/KG q12hrs, if no improvement consider increasing to 8UI/hr  - f/u AM labs  - f/u Nephro: Dr. Herman Consult    #CASPER 2/2 Hypercalcemia +dehydration  - On admission Cr 2.54 and GFR 26, unknown baseline  - s/p 2L IVF bolus  - Continue IVF at 125cc/hr  - Avoid nephrotoxic drugs  - f/u Neprho consult    #HTN  - BP stable  - Will hold indapamide and Lisinopril  - Continue to trend    #Constipation  - 2/2 severe hypercalcemia  - Will must likely improve   - consider Senna and Miralax regimen if no improvement after resolution of Ca levels    #Hyperthyrodism  - Continue Methimazole qd    #OCD  - Continue home meds    #DVT ppx  - Improved VTE score: 1  - SCDs  - Encourage ambulation     #Code status  - Full code  - Emergency contact: Wife Ms. OdomLyly [phone]    *Case discussed with Dr. Pineda 76 yo male with PMHx HTN, HLD, BPH, OCD and Hyperthyroidism presents today to [facility] sent by his PCP for hypercalcemia.     #Severe Hypercalcemia  - Admit ot MedSurg with remote Tele  - Possibly Medication induced  - Calcium on admission 15.9, corrected Ca 16.3  - s/p 2L IVF and Calcitonin 310 UI in the ED  - Will continue IVF at 125cc/hr  - Continue Calcitonin 4UI/KG q12hrs, if no improvement consider increasing to 8UI/hr  - f/u urine calcium, Electrophoresis, PTH, PTHr to r/o other causes  - f/u Nephro: Dr. Herman Consult    #CASPER 2/2 Hypercalcemia +dehydration  - On admission Cr 2.54 and GFR 26, unknown baseline  - s/p 2L IVF bolus  - Continue IVF at 125cc/hr  - Avoid nephrotoxic drugs  - f/u US Kidney and Bladder  - f/u Neprho consult    #HTN  - BP stable  - Will hold indapamide and Lisinopril  - Continue to trend    #Constipation  - 2/2 severe hypercalcemia  - Will must likely improve   - consider Senna and Miralax regimen if no improvement after resolution of Ca levels    #Hyperthyrodism  - Continue Methimazole qd  - f/u TSH    #OCD  - Continue home meds    #DVT ppx  - Improved VTE score: 1  - SCDs  - Encourage ambulation     #Code status  - Full code  - Emergency contact: Wife Lyly Chavez [phone]    *Case discussed with Dr. Pineda 78 yo male with PMHx HTN, HLD, BPH, OCD and Hyperthyroidism presents today to [facility] sent by his PCP for hypercalcemia.     #Severe Hypercalcemia  - Admit ot MedSurg with remote Tele  - Possibly Medication induced  - Calcium on admission 15.9, corrected Ca 16.3  - s/p 2L IVF and Calcitonin 310 UI in the ED  - Will continue IVF at 125cc/hr  - Continue Calcitonin 4UI/KG q12hrs, if no improvement consider increasing to 8UI/hr  - f/u urine calcium, protein Electrophoresis, PTH, PTHr to r/o other causes  - f/u Nephro: Dr. Herman Consult    #CASPER 2/2 Hypercalcemia +dehydration  - On admission Cr 2.54 and GFR 26, unknown baseline  - s/p 2L IVF bolus  - Continue IVF at 125cc/hr  - Avoid nephrotoxic drugs  - f/u US Kidney and Bladder  - f/u Neprho consult    #HTN  - BP stable  - Will hold indapamide and Lisinopril  - Continue to trend    #Constipation  - 2/2 severe hypercalcemia  - Will must likely improve once hypercalcemia resolves  - consider Senna and Miralax regimen if no improvement after resolution of Ca levels    #Hyperthyrodism  - Continue Methimazole qd  - f/u TSH    #OCD  - Continue home meds    #DVT ppx  - Improved VTE score: 1  - SCDs  - Encourage ambulation     #Code status  - Full code  - Emergency contact: Wife Lyly Chavez [phone]    *Case discussed with Dr. Pineda

## 2023-01-05 NOTE — PROGRESS NOTE ADULT - ASSESSMENT
78 yo male with PMHx HTN, HLD, BPH, OCD and Hyperthyroidism presents today to [facility] sent by his PCP for hypercalcemia.     #Severe Hypercalcemia  - Possibly Medication induced, or malignancy  - Calcium on admission 15.9, corrected Ca 16.3  - s/p 2L IVF and Calcitonin 310 UI in the ED  - Will continue IVF at 125cc/hr for 24 hours   - Continue Calcitonin 4UI/KG q12hrs, if no improvement consider increasing to 8UI/hr  - f/u urine calcium, protein Electrophoresis, PTH, PTHr to r/o other causes  - f/u Nephro Consult   - CT abdomen and Pelvis for Bone mets with history of prostate cancer. Negative.   - PTH elevated, may have malignancy, Heme Onc Consult   - Follow up Parathyroid Scan     #CASPER 2/2 Hypercalcemia +dehydration  - On admission Cr 2.54 and GFR 26, unknown baseline  - s/p 2L IVF bolus  - Continue IVF at 125cc/hr  - Avoid nephrotoxic drugs  - f/u US Kidney and Bladder  - f/u Neprho consult    #HTN  - BP stable  - Will hold indapamide and Lisinopril  - Continue to trend    #Constipation  - 2/2 severe hypercalcemia  - Will must likely improve once hypercalcemia resolves  - consider Senna and Miralax regimen if no improvement after resolution of Ca levels    #Hyperthyrodism  - Continue Methimazole qd  - f/u TSH    #OCD  - Continue home meds    #DVT ppx  - Improved VTE score: 1  - SCDs  - Encourage ambulation     #Code status  - Full code  - Emergency contact: Wife Ms. OdomLyly [phone]

## 2023-01-05 NOTE — PROGRESS NOTE ADULT - SUBJECTIVE AND OBJECTIVE BOX
76 yo male with PMHx HTN, HLD, BPH, OCD and Hypothyroidism presents today to [facility] sent by his PCP for hypercalcemia.  Patient mentions that for the past couple of days he has had no apettite and he has been constipated, with pain on both knees and feeling really weak on both legs so he decided to go ot his PCP and was told to stop taking his diuretic and to come to the ED.  Patient denies any chest pain, palpitaitons, SOB, lightheadedness, dizziness, fever, chills,     In the ED VSS,  Calcium was 15.9, Cr 2.54, BUN 55, GFR 26 and potassium 3.4.    EKG showed:  Patient was given 2L NS and Calcitonin injection.      77y YO Male With a PMH of  PAST MEDICAL & SURGICAL HISTORY:  Hypertension      Hyperlipidemia      HTN (hypertension)      Patient is a 77y old  Male who presents with a chief complaint of Abnormal labs (05 Jan 2023 01:20)      Subjective: Patient was seen and examined by me this morning at bedside.     REVIEW OF SYSTEMS:  CONSTITUTIONAL: No weakness, fevers or chills  EYES/ENT: No visual changes;  No vertigo or throat pain   NECK: No pain or stiffness  RESPIRATORY: No cough, wheezing, hemoptysis; No shortness of breath  CARDIOVASCULAR: No chest pain or palpitations  GASTROINTESTINAL: No abdominal or epigastric pain. No nausea, vomiting; No diarrhea or constipation.   GENITOURINARY: No dysuria, frequency or hematuria  NEUROLOGICAL: No numbness or weakness  SKIN: No itching, burning, rashes, or lesions     PHYSICAL EXAM:  Vital Signs Last 24 Hrs  T(C): 37 (05 Jan 2023 16:00), Max: 37 (05 Jan 2023 16:00)  T(F): 98.6 (05 Jan 2023 16:00), Max: 98.6 (05 Jan 2023 16:00)  HR: 72 (05 Jan 2023 16:00) (71 - 77)  BP: 157/67 (05 Jan 2023 16:00) (142/82 - 171/85)  BP(mean): 107 (05 Jan 2023 05:51) (100 - 139)  RR: 18 (05 Jan 2023 16:00) (18 - 19)  SpO2: 100% (05 Jan 2023 16:00) (97% - 100%)    Parameters below as of 05 Jan 2023 16:00  Patient On (Oxygen Delivery Method): room air        Constitutional: Pt lying in bed, awake and alert, NAD  HEENT: EOMI, normal hearing, moist mucous membranes  Neck: Soft and supple, no JVD  Respiratory: CTABL, No wheezing, rales or rhonchi  Cardiovascular: S1S2+, RRR, no M/G/R  Gastrointestinal: BS+, soft, NT/ND, no guarding, no rebound  Extremities: No peripheral edema  Vascular: 2+ peripheral pulses  Neurological: AAOx3, no focal deficits  Musculoskeletal: 5/5 strength b/l upper and lower extremities  Skin: No rashes    MEDICATIONS:  MEDICATIONS  (STANDING):  aspirin enteric coated 81 milliGRAM(s) Oral daily  doxazosin 4 milliGRAM(s) Oral at bedtime  methimazole 5 milliGRAM(s) Oral daily  PARoxetine 50 milliGRAM(s) Oral daily  sodium chloride 0.9%. 1000 milliLiter(s) (125 mL/Hr) IV Continuous <Continuous>  sodium chloride 0.9%. 1000 milliLiter(s) (125 mL/Hr) IV Continuous <Continuous>  sodium chloride 0.9%. 1000 milliLiter(s) (125 mL/Hr) IV Continuous <Continuous>    MEDICATIONS  (PRN):  acetaminophen     Tablet .. 650 milliGRAM(s) Oral every 6 hours PRN Mild Pain (1 - 3)  aluminum hydroxide/magnesium hydroxide/simethicone Suspension 30 milliLiter(s) Oral every 4 hours PRN Dyspepsia  melatonin 3 milliGRAM(s) Oral at bedtime PRN Insomnia  ondansetron Injectable 4 milliGRAM(s) IV Push every 8 hours PRN Nausea and/or Vomiting      LABS: All Labs Reviewed:                        11.3   11.73 )-----------( 242      ( 05 Jan 2023 06:04 )             35.0     01-05    141  |  111<H>  |  55<H>  ----------------------------<  113<H>  3.6   |  25  |  2.51<H>    Ca    14.5<HH>      05 Jan 2023 06:04  Phos  2.7     01-04  Mg     1.7     01-04    TPro  6.3  /  Alb  3.3  /  TBili  0.6  /  DBili  x   /  AST  18  /  ALT  23  /  AlkPhos  55  01-05          Blood Culture:   I&O's Summary    CAPILLARY BLOOD GLUCOSE          RADIOLOGY/EKG:  < from: CT Abdomen and Pelvis No Cont (01.05.23 @ 12:10) >  IMPRESSION:  No acute abnormality in the abdomen and pelvis.        --- End of Report ---            AMANUEL VILLAGOMEZ MD; Attending Radiologist  This document has been electronically signed. Jan 5 2023  4:38PM    < end of copied text >  < from: US Kidney and Bladder (01.05.23 @ 10:01) >  IMPRESSION:  No hydronephrosis.  Enlarged prostate gland.        --- End of Report ---            LENA ROSENTHAL MD; Attending Radiologist  This document has been electronically signed. Jan 5 2023 10:35AM    < end of copied text >  < from: Xray Chest 1 View- PORTABLE-Urgent (01.04.23 @ 19:57) >  IMPRESSION: No acute finding or change. Right tracheal deviation again   noted.    --- End of Report ---            JARRET LEE MD; Attending Radiologist  This document has been electronically signed. Jan 5 2023  3:28PM    < end of copied text >

## 2023-01-05 NOTE — PATIENT PROFILE ADULT - OVER THE PAST TWO WEEKS HAVE YOU FELT DOWN, DEPRESSED OR HOPELESS?
no Ms. Foley is a 50 y/o F with PMHx of DM2, HTN, and hypothyroidism, now with newly diagnosed B-cell ALL, BCR-ABL (-) negative amd SDH, E. Coli bacteremia treated with zosyn with recurrence of bacteremia on 8/2 treated with abx followed by ID. Treatment following CALGB 8811/9111 (Cyclophosphamide, Daunorubicin, Vincristine, prednisone, peg asparaginase). 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 Prednisone stopped due to elevated bili after day 17 of 21; Grade 3 hyperbilirubinemia attributed to peg asparaginase confirmed by liver bx on 8/4 started on Lcarnitine per hepatology, Day 15 and 22 Vincristine held due to hyperbilirubinemia, hypofibrinogenemia treated with cryoprecipitate, +DVT R subclavian vein, + RML/RLL PE on heparin. Followed by palliative care for pain management and supportive care.  BM biopsy 7/25 showed morphologic remission.

## 2023-01-06 LAB
% ALBUMIN: 58.3 % — SIGNIFICANT CHANGE UP
% ALPHA 1: 4.8 % — SIGNIFICANT CHANGE UP
% ALPHA 2: 10.1 % — SIGNIFICANT CHANGE UP
% BETA: 12.7 % — SIGNIFICANT CHANGE UP
% GAMMA: 14.1 % — SIGNIFICANT CHANGE UP
ADD ON TEST-SPECIMEN IN LAB: SIGNIFICANT CHANGE UP
ALBUMIN SERPL ELPH-MCNC: 3.3 G/DL — LOW (ref 3.6–5.5)
ALBUMIN/GLOB SERPL ELPH: 1.4 RATIO — SIGNIFICANT CHANGE UP
ALPHA1 GLOB SERPL ELPH-MCNC: 0.3 G/DL — SIGNIFICANT CHANGE UP (ref 0.1–0.4)
ALPHA2 GLOB SERPL ELPH-MCNC: 0.6 G/DL — SIGNIFICANT CHANGE UP (ref 0.5–1)
ANION GAP SERPL CALC-SCNC: 3 MMOL/L — LOW (ref 5–17)
B-GLOBULIN SERPL ELPH-MCNC: 0.7 G/DL — SIGNIFICANT CHANGE UP (ref 0.5–1)
BUN SERPL-MCNC: 48 MG/DL — HIGH (ref 7–23)
CALCIUM SERPL-MCNC: 13.6 MG/DL — CRITICAL HIGH (ref 8.5–10.1)
CHLORIDE SERPL-SCNC: 115 MMOL/L — HIGH (ref 96–108)
CO2 SERPL-SCNC: 25 MMOL/L — SIGNIFICANT CHANGE UP (ref 22–31)
CREAT SERPL-MCNC: 2.22 MG/DL — HIGH (ref 0.5–1.3)
EGFR: 30 ML/MIN/1.73M2 — LOW
GAMMA GLOBULIN: 0.8 G/DL — SIGNIFICANT CHANGE UP (ref 0.6–1.6)
GLUCOSE SERPL-MCNC: 90 MG/DL — SIGNIFICANT CHANGE UP (ref 70–99)
PHOSPHATE SERPL-MCNC: 2.3 MG/DL — LOW (ref 2.5–4.5)
POTASSIUM SERPL-MCNC: 3.9 MMOL/L — SIGNIFICANT CHANGE UP (ref 3.5–5.3)
POTASSIUM SERPL-SCNC: 3.9 MMOL/L — SIGNIFICANT CHANGE UP (ref 3.5–5.3)
PROT PATTERN SERPL ELPH-IMP: SIGNIFICANT CHANGE UP
SODIUM SERPL-SCNC: 143 MMOL/L — SIGNIFICANT CHANGE UP (ref 135–145)

## 2023-01-06 PROCEDURE — 76536 US EXAM OF HEAD AND NECK: CPT | Mod: 26

## 2023-01-06 PROCEDURE — 99233 SBSQ HOSP IP/OBS HIGH 50: CPT | Mod: GC

## 2023-01-06 RX ORDER — AMLODIPINE BESYLATE 2.5 MG/1
10 TABLET ORAL DAILY
Refills: 0 | Status: DISCONTINUED | OUTPATIENT
Start: 2023-01-07 | End: 2023-01-08

## 2023-01-06 RX ORDER — SODIUM CHLORIDE 9 MG/ML
1000 INJECTION, SOLUTION INTRAVENOUS
Refills: 0 | Status: DISCONTINUED | OUTPATIENT
Start: 2023-01-06 | End: 2023-01-08

## 2023-01-06 RX ORDER — AMLODIPINE BESYLATE 2.5 MG/1
5 TABLET ORAL DAILY
Refills: 0 | Status: DISCONTINUED | OUTPATIENT
Start: 2023-01-06 | End: 2023-01-06

## 2023-01-06 RX ORDER — CINACALCET 30 MG/1
30 TABLET, FILM COATED ORAL
Refills: 0 | Status: DISCONTINUED | OUTPATIENT
Start: 2023-01-06 | End: 2023-01-08

## 2023-01-06 RX ORDER — LISINOPRIL 2.5 MG/1
40 TABLET ORAL DAILY
Refills: 0 | Status: DISCONTINUED | OUTPATIENT
Start: 2023-01-06 | End: 2023-01-06

## 2023-01-06 RX ORDER — METOCLOPRAMIDE HCL 10 MG
5 TABLET ORAL ONCE
Refills: 0 | Status: COMPLETED | OUTPATIENT
Start: 2023-01-06 | End: 2023-01-06

## 2023-01-06 RX ADMIN — Medication 650 MILLIGRAM(S): at 16:14

## 2023-01-06 RX ADMIN — Medication 4 MILLIGRAM(S): at 22:27

## 2023-01-06 RX ADMIN — SODIUM CHLORIDE 125 MILLILITER(S): 9 INJECTION INTRAMUSCULAR; INTRAVENOUS; SUBCUTANEOUS at 08:50

## 2023-01-06 RX ADMIN — CINACALCET 30 MILLIGRAM(S): 30 TABLET, FILM COATED ORAL at 22:27

## 2023-01-06 RX ADMIN — Medication 5 MILLIGRAM(S): at 16:18

## 2023-01-06 RX ADMIN — ONDANSETRON 4 MILLIGRAM(S): 8 TABLET, FILM COATED ORAL at 09:03

## 2023-01-06 RX ADMIN — Medication 50 MILLIGRAM(S): at 09:05

## 2023-01-06 RX ADMIN — Medication 81 MILLIGRAM(S): at 09:04

## 2023-01-06 RX ADMIN — CALCITONIN SALMON 310 INTERNATIONAL UNIT(S): 200 INJECTION, SOLUTION INTRAMUSCULAR at 10:34

## 2023-01-06 RX ADMIN — AMLODIPINE BESYLATE 5 MILLIGRAM(S): 2.5 TABLET ORAL at 16:15

## 2023-01-06 RX ADMIN — SODIUM CHLORIDE 125 MILLILITER(S): 9 INJECTION, SOLUTION INTRAVENOUS at 17:30

## 2023-01-06 NOTE — PROGRESS NOTE ADULT - ASSESSMENT
78 yo male with PMHx HTN, HLD, BPH, OCD and Hypothyroidism presents today to [facility] sent by his PCP for hypercalcemia.  Patient mentions that for the past couple of days he has had no apettite and he has been constipated, with pain on both knees and feeling really weak on both legs so he decided to go ot his PCP and was told to stop taking his diuretic and to come to the ED.  Patient denies any chest pain, palpitaitons, SOB, lightheadedness, dizziness, fever, chills,     In the ED VSS,  Calcium was 15.9, Cr 2.54, BUN 55, GFR 26 and potassium 3.4.    EKG showed:  Patient was given 2L NS and Calcitonin injection.      Nephrology:  Above information obtained from chart  77 yo m h/o prostate Ca, HLD. CKD, elevated PTH.  Pt presented with  and Ca 15.9, on calcitonin and IVF  Case d/w Pts wife and daughter  Pt has been feeling weak recently, white spots in front of eyes  EGFR 26    A/P  Evaluate for parathyroid adenoma, less likely carcinoma  The Intraoperative IPTH in chart most likely in error, and is an outpt iPTH level  pt never had a parathyroid surgery  PTH and Ca too elevated for primary hyperparathyroidsm  Will cont w Calcitonin q 6 h for total of 4 doses  IVF  Stat BMP tonight  US thyroid/ parathyroid   evaluate for bisphosphonate slow IV infusion if needed  if calcium does not improve and permitted as per renal function    1/6  Calcium 13.6  Creat down 2.22 mg/ dl, EGFR: 30 ml/min  on Calcitonin q 6 x 4 doses  IVF       76 yo male with PMHx HTN, HLD, BPH, OCD and Hypothyroidism presents today to [facility] sent by his PCP for hypercalcemia.  Patient mentions that for the past couple of days he has had no apettite and he has been constipated, with pain on both knees and feeling really weak on both legs so he decided to go ot his PCP and was told to stop taking his diuretic and to come to the ED.  Patient denies any chest pain, palpitaitons, SOB, lightheadedness, dizziness, fever, chills,     In the ED VSS,  Calcium was 15.9, Cr 2.54, BUN 55, GFR 26 and potassium 3.4.    EKG showed:  Patient was given 2L NS and Calcitonin injection.      Nephrology:  Above information obtained from chart  75 yo m h/o prostate Ca, HLD. CKD, elevated PTH.  Pt presented with  and Ca 15.9, on calcitonin and IVF  Case d/w Pts wife and daughter  Pt has been feeling weak recently, white spots in front of eyes  EGFR 26    A/P  Evaluate for parathyroid adenoma, less likely carcinoma  The Intraoperative IPTH in chart most likely in error, and is an outpt iPTH level  pt never had a parathyroid surgery  PTH and Ca too elevated for primary hyperparathyroidsm  Will cont w Calcitonin q 6 h for total of 4 doses  IVF  Stat BMP tonight  US thyroid/ parathyroid   evaluate for bisphosphonate slow IV infusion if needed  if calcium does not improve and permitted as per renal function    1/6  Calcium 13.6  Creat down 2.22 mg/ dl, EGFR: 30 ml/min  on Calcitonin q 6 x 4 doses  IVF NS @ 125  hyperchloremia developing  will switch to 1/2 NS  d/w Dr BOB Roberto, radiology, + 2 cm R lower thyroid lobe nodular lesion, suggested Sestamibi scan  Will start cinacalcet 30 mg po bid , 1st dose now  Calcitonin w  effect limited, Ca @ 13.6 since last night and   as per pharmacy zoledronic acid only available of bisphosphonates and   is contraindicated for creat clearance < 35 ml/ min   D/W Dr Marsh       78 yo male with PMHx HTN, HLD, BPH, OCD and Hypothyroidism presents today to [facility] sent by his PCP for hypercalcemia.  Patient mentions that for the past couple of days he has had no apettite and he has been constipated, with pain on both knees and feeling really weak on both legs so he decided to go ot his PCP and was told to stop taking his diuretic and to come to the ED.  Patient denies any chest pain, palpitaitons, SOB, lightheadedness, dizziness, fever, chills,     In the ED VSS,  Calcium was 15.9, Cr 2.54, BUN 55, GFR 26 and potassium 3.4.    EKG showed:  Patient was given 2L NS and Calcitonin injection.      Nephrology:  Above information obtained from chart  75 yo m h/o prostate Ca, HLD. CKD, elevated PTH.  Pt presented with  and Ca 15.9, on calcitonin and IVF  Case d/w Pts wife and daughter  Pt has been feeling weak recently, white spots in front of eyes  EGFR 26    A/P  Evaluate for parathyroid adenoma, less likely carcinoma  The Intraoperative IPTH in chart most likely in error, and is an outpt iPTH level  pt never had a parathyroid surgery  PTH and Ca too elevated for primary hyperparathyroidsm  Will cont w Calcitonin q 6 h for total of 4 doses  IVF  Stat BMP tonight  US thyroid/ parathyroid   evaluate for bisphosphonate slow IV infusion if needed  if calcium does not improve and permitted as per renal function    1/6  Calcium 13.6, [place] Ca 14.6, Alb 2.7  Creat down 2.22 mg/ dl, EGFR: 30 ml/min  on Calcitonin q 6 x 4 doses  IVF NS @ 125  hyperchloremia developing  will switch to 1/2 NS  d/w Dr BOB Roberto, radiology, + 2 cm R lower thyroid lobe nodular lesion, suggested Sestamibi scan  Will start cinacalcet 30 mg po bid , 1st dose now  Calcitonin w  effect limited, Ca @ 13.6 since last night and   as per pharmacy zoledronic acid only available of bisphosphonates and   is contraindicated for creat clearance < 35 ml/ min   D/W Dr Marsh       76 yo male with PMHx HTN, HLD, BPH, OCD and Hypothyroidism presents today to [facility] sent by his PCP for hypercalcemia.  Patient mentions that for the past couple of days he has had no apettite and he has been constipated, with pain on both knees and feeling really weak on both legs so he decided to go ot his PCP and was told to stop taking his diuretic and to come to the ED.  Patient denies any chest pain, palpitaitons, SOB, lightheadedness, dizziness, fever, chills,     In the ED VSS,  Calcium was 15.9, Cr 2.54, BUN 55, GFR 26 and potassium 3.4.    EKG showed:  Patient was given 2L NS and Calcitonin injection.      Nephrology:  Above information obtained from chart  75 yo m h/o prostate Ca, HLD. CKD, elevated PTH.  Pt presented with  and Ca 15.9, on calcitonin and IVF  Case d/w Pts wife and daughter  Pt has been feeling weak recently, white spots in front of eyes  EGFR 26    A/P  Evaluate for parathyroid adenoma, less likely carcinoma  The Intraoperative IPTH in chart most likely in error, and is an outpt iPTH level  pt never had a parathyroid surgery  PTH and Ca too elevated for primary hyperparathyroidsm  Will cont w Calcitonin q 6 h for total of 4 doses  IVF  Stat BMP tonight  US thyroid/ parathyroid   evaluate for bisphosphonate slow IV infusion if needed  if calcium does not improve and permitted as per renal function    1/6  Calcium 13.6, [place] Ca 14.6, Alb 2.7  Creat down 2.22 mg/ dl, EGFR: 30 ml/min  on Calcitonin q 6 x 4 doses  IVF NS @ 125  hyperchloremia developing  will switch to 1/2 NS  d/w Dr BOB Roberto, radiology, + 2 cm L lower thyroid lobe nodular lesion, suggested Sestamibi scan  Will start cinacalcet 30 mg po bid , 1st dose now  Calcitonin w  effect limited, Ca @ 13.6 since last night and   as per pharmacy zoledronic acid only available of bisphosphonates and   is contraindicated for creat clearance < 35 ml/ min   D/W Dr Marsh

## 2023-01-06 NOTE — PROGRESS NOTE ADULT - SUBJECTIVE AND OBJECTIVE BOX
78 yo male with PMHx HTN, HLD, BPH, OCD and Hypothyroidism presents today to [facility] sent by his PCP for hypercalcemia. Presented to the ED on 1/4/23 Patient mentions that for the past couple of days he has had no appetite and he has been constipated, with pain on both knees and feeling really weak on both legs so he decided to go ot his PCP and was told to stop taking his diuretic and to come to the ED.  Patient denies any chest pain, palpitaitons, SOB, lightheadedness, dizziness, fever, chills,     In the ED VSS,  Calcium was 15.9, Cr 2.54, BUN 55, GFR 26 and potassium 3.4.    EKG showed:  Patient was given 2L NS and Calcitonin injection.      Pt was admitted for further management of his hypercalcemia    1/6/23 Pt was seen and examined at bedside. Pt Abd pain has improved. Pt able to tolerate diet. No bone pain. Pt also     REVIEW OF SYSTEMS:  CONSTITUTIONAL: No weakness, fevers or chills  EYES/ENT: No visual changes;  No vertigo or throat pain   NECK: No pain or stiffness  RESPIRATORY: No cough, wheezing, hemoptysis; No shortness of breath  CARDIOVASCULAR: No chest pain or palpitations  GASTROINTESTINAL: No abdominal or epigastric pain. No nausea, vomiting; No diarrhea or constipation.   GENITOURINARY: No dysuria, frequency or hematuria  NEUROLOGICAL: No numbness or weakness  SKIN: No itching, burning, rashes, or lesions     PHYSICAL EXAM:  Vital Signs Last 24 Hrs  T(C): 36.7 (06 Jan 2023 08:10), Max: 37 (05 Jan 2023 16:00)  T(F): 98.1 (06 Jan 2023 08:10), Max: 98.6 (05 Jan 2023 16:00)  HR: 80 (06 Jan 2023 08:10) (70 - 80)  BP: 152/57 (06 Jan 2023 08:10) (150/85 - 157/67)  BP(mean): --  RR: 18 (06 Jan 2023 08:10) (18 - 18)  SpO2: 97% (06 Jan 2023 08:10) (96% - 100%)    Parameters below as of 06 Jan 2023 08:10  Patient On (Oxygen Delivery Method): room air      Constitutional: Pt lying in bed, awake and alert, NAD  HEENT: EOMI, normal hearing, moist mucous membranes  Neck: Soft and supple, no JVD  Respiratory: CTABL, No wheezing, rales or rhonchi  Cardiovascular: S1S2+, RRR, no M/G/R  Gastrointestinal: BS+, soft, NT/ND, no guarding, no rebound  Extremities: No peripheral edema  Vascular: 2+ peripheral pulses  Neurological: AAOx3, no focal deficits  Musculoskeletal: 5/5 strength b/l upper and lower extremities  Skin: No rashes    MEDICATIONS:  MEDICATIONS  (STANDING):  aspirin enteric coated 81 milliGRAM(s) Oral daily  doxazosin 4 milliGRAM(s) Oral at bedtime  methimazole 5 milliGRAM(s) Oral daily  metoclopramide 5 milliGRAM(s) Oral once  PARoxetine 50 milliGRAM(s) Oral daily  sodium chloride 0.9%. 1000 milliLiter(s) (125 mL/Hr) IV Continuous <Continuous>  sodium chloride 0.9%. 1000 milliLiter(s) (125 mL/Hr) IV Continuous <Continuous>  sodium chloride 0.9%. 1000 milliLiter(s) (125 mL/Hr) IV Continuous <Continuous>    MEDICATIONS  (PRN):  acetaminophen     Tablet .. 650 milliGRAM(s) Oral every 6 hours PRN Mild Pain (1 - 3)  aluminum hydroxide/magnesium hydroxide/simethicone Suspension 30 milliLiter(s) Oral every 4 hours PRN Dyspepsia  melatonin 3 milliGRAM(s) Oral at bedtime PRN Insomnia  ondansetron Injectable 4 milliGRAM(s) IV Push every 8 hours PRN Nausea and/or Vomiting        LABS: All Labs Reviewed:                                   11.3   11.73 )-----------( 242      ( 05 Jan 2023 06:04 )             35.0   01-06    143  |  115<H>  |  48<H>  ----------------------------<  90  3.9   |  25  |  2.22<H>    Ca    13.6<HH>      06 Jan 2023 08:46  Phos  2.3     01-06  Mg     1.7     01-04    TPro  6.3  /  Alb  3.3  /  TBili  0.6  /  DBili  x   /  AST  18  /  ALT  23  /  AlkPhos  55  01-05        Blood Culture:   I&O's Summary    CAPILLARY BLOOD GLUCOSE          RADIOLOGY/EKG:  < from: CT Abdomen and Pelvis No Cont (01.05.23 @ 12:10) >  IMPRESSION:  No acute abnormality in the abdomen and pelvis.        --- End of Report ---            AMANUEL VILLAGOMEZ MD; Attending Radiologist  This document has been electronically signed. Jan 5 2023  4:38PM    < end of copied text >  < from: US Kidney and Bladder (01.05.23 @ 10:01) >  IMPRESSION:  No hydronephrosis.  Enlarged prostate gland.        --- End of Report ---            LENA ROSENTHAL MD; Attending Radiologist  This document has been electronically signed. Jan 5 2023 10:35AM    < end of copied text >  < from: Xray Chest 1 View- PORTABLE-Urgent (01.04.23 @ 19:57) >  IMPRESSION: No acute finding or change. Right tracheal deviation again   noted.    --- End of Report ---            JARRET LEE MD; Attending Radiologist  This document has been electronically signed. Jan 5 2023  3:28PM    < end of copied text >

## 2023-01-06 NOTE — PROGRESS NOTE ADULT - SUBJECTIVE AND OBJECTIVE BOX
NEPHROLOGY CONSULT  HPI:  76 yo male with PMHx HTN, HLD, BPH, OCD and Hypothyroidism presents today to [facility] sent by his PCP for hypercalcemia.  Patient mentions that for the past couple of days he has had no apettite and he has been constipated, with pain on both knees and feeling really weak on both legs so he decided to go ot his PCP and was told to stop taking his diuretic and to come to the ED.  Patient denies any chest pain, palpitaitons, SOB, lightheadedness, dizziness, fever, chills,     In the ED VSS,  Calcium was 15.9, Cr 2.54, BUN 55, GFR 26 and potassium 3.4.    EKG showed:  Patient was given 2L NS and Calcitonin injection.      Nephrology:  Above information obtained from chart  77 yo m h/o prostate Ca, HLD. CKD, elevated PTH.  Pt presented with  and Ca 15.9, on calcitonin and IVF  Case d/w Pts wife and daughter  Pt has been feeling weak recently, white spots in front of eyes  EGFR 26    1/6  Calcium 13.6  Creat down 2.22 mg/ dl, EGFR: 30 ml/min  on Calcitonin q 6 x 4 doses  IVF          PAST MEDICAL & SURGICAL HISTORY:  Hypertension      Hyperlipidemia      HTN (hypertension)          FAMILY HISTORY:      MEDICATIONS  (STANDING):  aspirin enteric coated 81 milliGRAM(s) Oral daily  calcitonin Injectable 310 International Unit(s) IntraMuscular every 12 hours  doxazosin 4 milliGRAM(s) Oral at bedtime  methimazole 5 milliGRAM(s) Oral daily  PARoxetine 50 milliGRAM(s) Oral daily  sodium chloride 0.9%. 1000 milliLiter(s) (125 mL/Hr) IV Continuous <Continuous>  sodium chloride 0.9%. 1000 milliLiter(s) (125 mL/Hr) IV Continuous <Continuous>  sodium chloride 0.9%. 1000 milliLiter(s) (125 mL/Hr) IV Continuous <Continuous>    MEDICATIONS  (PRN):  acetaminophen     Tablet .. 650 milliGRAM(s) Oral every 6 hours PRN Mild Pain (1 - 3)  aluminum hydroxide/magnesium hydroxide/simethicone Suspension 30 milliLiter(s) Oral every 4 hours PRN Dyspepsia  melatonin 3 milliGRAM(s) Oral at bedtime PRN Insomnia  ondansetron Injectable 4 milliGRAM(s) IV Push every 8 hours PRN Nausea and/or Vomiting      Allergies    No Known Allergies    Intolerances        I&O's Summary        REVIEW OF SYSTEMS:    Weakness  Otherwise Neg      Vital Signs Last 24 Hrs  T(C): 37 (2023 16:00), Max: 37 (2023 16:00)  T(F): 98.6 (2023 16:00), Max: 98.6 (2023 16:00)  HR: 72 (2023 16:00) (71 - 77)  BP: 157/67 (2023 16:00) (142/82 - 171/85)  BP(mean): 107 (2023 05:51) (100 - 139)  RR: 18 (2023 16:00) (18 - 19)  SpO2: 100% (2023 16:00) (97% - 100%)    Parameters below as of 2023 16:00  Patient On (Oxygen Delivery Method): room air      I&O's Detail     ml      PHYSICAL EXAM:    General:  Alert, No acute distress.    Neuro:  Alert and oriented to person, place, and time. Able to communicate  well.      HEENT:  No JVD, no masses, Eyes anicteric, ,    Cardiovascular:  Regular rate and rhythm, with normal S1 and S2.    Lungs:  clear. no rales, no wheezing, .    Abdomen:  Normoactive bowel sounds. Soft, flat, non-tender, and non-distended.  positive bowel sounds    Skin:  Warm, dry    Extremities:  warm,  no cyanosis    LABS:                          11.3   11.73 )-----------( 242      ( 2023 06:04 )             35.0                         13.0   8.72  )-----------( 278      ( 2023 18:20 )             40.2                           11.3   11.73 )-----------( 242      ( 2023 06:04 )             35.0     01-06    143  |  115<H>  |  48<H>  ----------------------------<  90  3.9   |  25  |  2.22<H>    Ca    13.6<HH>      2023 08:46  Phos  2.3     01-06  Mg     1.7     01-04    TPro  6.3  /  Alb  3.3  /  TBili  0.6  /  DBili  x   /  AST  18  /  ALT  23  /  AlkPhos  55      141  |  111<H>  |  55<H>  ----------------------------<  113<H>  3.6   |  25  |  2.51<H>    Ca    14.5<HH>      2023 06:04  Phos  2.7       Mg     1.7         TPro  6.3  /  Alb  3.3  /  TBili  0.6  /  DBili  x   /  AST  18  /  ALT  23  /  AlkPhos  55        Urinalysis Basic - ( 2023 08:00 )    Color: Yellow / Appearance: Clear / S.020 / pH: x  Gluc: x / Ketone: Negative  / Bili: Negative / Urobili: Negative   Blood: x / Protein: Negative / Nitrite: Negative   Leuk Esterase: Negative / RBC: 6-10 /HPF / WBC Negative /HPF   Sq Epi: x / Non Sq Epi: Negative / Bacteria: Negative      Intact PTH: 481 pg/mL ( @ 06:04)  Vitamin D, 25-Hydroxy: 64.9 ng/mL ( @ 06:04)       NEPHROLOGY CONSULT  HPI:  78 yo male with PMHx HTN, HLD, BPH, OCD and Hypothyroidism presents today to [facility] sent by his PCP for hypercalcemia.  Patient mentions that for the past couple of days he has had no apettite and he has been constipated, with pain on both knees and feeling really weak on both legs so he decided to go ot his PCP and was told to stop taking his diuretic and to come to the ED.  Patient denies any chest pain, palpitaitons, SOB, lightheadedness, dizziness, fever, chills,     In the ED VSS,  Calcium was 15.9, Cr 2.54, BUN 55, GFR 26 and potassium 3.4.    EKG showed:  Patient was given 2L NS and Calcitonin injection.      Nephrology:  Above information obtained from chart  75 yo m h/o prostate Ca, HLD. CKD, elevated PTH.  Pt presented with  and Ca 15.9, on calcitonin and IVF  Case d/w Pts wife and daughter  Pt has been feeling weak recently, white spots in front of eyes  EGFR 26    1/6  Calcium 13.6  Creat down 2.22 mg/ dl, EGFR: 30 ml/min  on Calcitonin q 6 x 4 doses  IVF continued  neck US reviewed with Dr BOB GUTIERREZ thyroid lobe with 2 CM nodule          PAST MEDICAL & SURGICAL HISTORY:  Hypertension      Hyperlipidemia      HTN (hypertension)          FAMILY HISTORY:      MEDICATIONS  (STANDING):  amLODIPine   Tablet 5 milliGRAM(s) Oral daily  aspirin enteric coated 81 milliGRAM(s) Oral daily  cinacalcet 30 milliGRAM(s) Oral two times a day  doxazosin 4 milliGRAM(s) Oral at bedtime  methimazole 5 milliGRAM(s) Oral daily  metoclopramide 5 milliGRAM(s) Oral once  PARoxetine 50 milliGRAM(s) Oral daily  sodium chloride 0.9%. 1000 milliLiter(s) (125 mL/Hr) IV Continuous <Continuous>  sodium chloride 0.9%. 1000 milliLiter(s) (125 mL/Hr) IV Continuous <Continuous>  sodium chloride 0.9%. 1000 milliLiter(s) (125 mL/Hr) IV Continuous <Continuous>    MEDICATIONS  (PRN):  acetaminophen     Tablet .. 650 milliGRAM(s) Oral every 6 hours PRN Mild Pain (1 - 3)  aluminum hydroxide/magnesium hydroxide/simethicone Suspension 30 milliLiter(s) Oral every 4 hours PRN Dyspepsia  melatonin 3 milliGRAM(s) Oral at bedtime PRN Insomnia  ondansetron Injectable 4 milliGRAM(s) IV Push every 8 hours PRN Nausea and/or Vomiting      Allergies    No Known Allergies    Intolerances        I&O's Summary        REVIEW OF SYSTEMS:    Weakness  Otherwise Neg      Vital Signs Last 24 Hrs  T(C): 36.9 (2023 15:20), Max: 37 (2023 16:00)  T(F): 98.5 (2023 15:20), Max: 98.6 (2023 16:00)  HR: 80 (2023 15:20) (70 - 80)  BP: 166/73 (2023 15:20) (150/85 - 166/73)  BP(mean): --  RR: 18 (2023 15:20) (18 - 18)  SpO2: 100% (2023 15:20) (96% - 100%)    Parameters below as of 2023 15:20  Patient On (Oxygen Delivery Method): room air          I&O's Detail     ml      PHYSICAL EXAM:    General:  Alert, No acute distress.    Neuro:  Alert and oriented to person, place, and time. Able to communicate  well.      HEENT:  No JVD, no masses, Eyes anicteric, ,    Cardiovascular:  Regular rate and rhythm, with normal S1 and S2.    Lungs:  clear. no rales, no wheezing, .    Abdomen:  Normoactive bowel sounds. Soft, flat, non-tender, and non-distended.  positive bowel sounds    Skin:  Warm, dry    Extremities:  warm,  no cyanosis    LABS:                                                 11.3   11.73 )-----------( 242      ( 2023 06:04 )             35.0                         13.0   8.72  )-----------( 278      ( 2023 18:20 )             40.2                           11.3   11.73 )-----------( 242      ( 2023 06:04 )             35.0     01-06    143  |  115<H>  |  48<H>  ----------------------------<  90  3.9   |  25  |  2.22<H>    Ca    13.6<HH>      2023 08:46  Phos  2.3     01-06  Mg     1.7     -    TPro  6.3  /  Alb  3.3  /  TBili  0.6  /  DBili  x   /  AST  18  /  ALT  23  /  AlkPhos  55      141  |  111<H>  |  55<H>  ----------------------------<  113<H>  3.6   |  25  |  2.51<H>    Ca    14.5<HH>      2023 06:04  Phos  2.7     -04  Mg     1.7     -    TPro  6.3  /  Alb  3.3  /  TBili  0.6  /  DBili  x   /  AST  18  /  ALT  23  /  AlkPhos  55        Urinalysis Basic - ( 2023 08:00 )    Color: Yellow / Appearance: Clear / S.020 / pH: x  Gluc: x / Ketone: Negative  / Bili: Negative / Urobili: Negative   Blood: x / Protein: Negative / Nitrite: Negative   Leuk Esterase: Negative / RBC: 6-10 /HPF / WBC Negative /HPF   Sq Epi: x / Non Sq Epi: Negative / Bacteria: Negative      Intact PTH: 481 pg/mL ( @ 06:04)  Vitamin D, 25-Hydroxy: 64.9 ng/mL ( @ 06:04)       NEPHROLOGY CONSULT  HPI:  78 yo male with PMHx HTN, HLD, BPH, OCD and Hypothyroidism presents today to [facility] sent by his PCP for hypercalcemia.  Patient mentions that for the past couple of days he has had no apettite and he has been constipated, with pain on both knees and feeling really weak on both legs so he decided to go ot his PCP and was told to stop taking his diuretic and to come to the ED.  Patient denies any chest pain, palpitaitons, SOB, lightheadedness, dizziness, fever, chills,     In the ED VSS,  Calcium was 15.9, Cr 2.54, BUN 55, GFR 26 and potassium 3.4.    EKG showed:  Patient was given 2L NS and Calcitonin injection.      Nephrology:  Above information obtained from chart  77 yo m h/o prostate Ca, HLD. CKD, elevated PTH.  Pt presented with  and Ca 15.9, on calcitonin and IVF  Case d/w Pts wife and daughter  Pt has been feeling weak recently, white spots in front of eyes  EGFR 26    1/6  Calcium 13.6  Creat down 2.22 mg/ dl, EGFR: 30 ml/min  on Calcitonin q 6 x 4 doses  IVF continued  neck US reviewed with Dr BOB GUTIERREZ thyroid lobe with 2 CM nodule          PAST MEDICAL & SURGICAL HISTORY:  Hypertension      Hyperlipidemia      HTN (hypertension)          FAMILY HISTORY:      MEDICATIONS  (STANDING):  amLODIPine   Tablet 5 milliGRAM(s) Oral daily  aspirin enteric coated 81 milliGRAM(s) Oral daily  cinacalcet 30 milliGRAM(s) Oral two times a day  doxazosin 4 milliGRAM(s) Oral at bedtime  methimazole 5 milliGRAM(s) Oral daily  metoclopramide 5 milliGRAM(s) Oral once  PARoxetine 50 milliGRAM(s) Oral daily  sodium chloride 0.9%. 1000 milliLiter(s) (125 mL/Hr) IV Continuous <Continuous>  sodium chloride 0.9%. 1000 milliLiter(s) (125 mL/Hr) IV Continuous <Continuous>  sodium chloride 0.9%. 1000 milliLiter(s) (125 mL/Hr) IV Continuous <Continuous>    MEDICATIONS  (PRN):  acetaminophen     Tablet .. 650 milliGRAM(s) Oral every 6 hours PRN Mild Pain (1 - 3)  aluminum hydroxide/magnesium hydroxide/simethicone Suspension 30 milliLiter(s) Oral every 4 hours PRN Dyspepsia  melatonin 3 milliGRAM(s) Oral at bedtime PRN Insomnia  ondansetron Injectable 4 milliGRAM(s) IV Push every 8 hours PRN Nausea and/or Vomiting      Allergies    No Known Allergies    Intolerances        I&O's Summary        REVIEW OF SYSTEMS:    Weakness  Otherwise Neg      Vital Signs Last 24 Hrs  T(C): 36.9 (2023 15:20), Max: 37 (2023 16:00)  T(F): 98.5 (2023 15:20), Max: 98.6 (2023 16:00)  HR: 80 (2023 15:20) (70 - 80)  BP: 166/73 (2023 15:20) (150/85 - 166/73)  BP(mean): --  RR: 18 (2023 15:20) (18 - 18)  SpO2: 100% (2023 15:20) (96% - 100%)    Parameters below as of 2023 15:20  Patient On (Oxygen Delivery Method): room air          I&O's Detail     ml      PHYSICAL EXAM:    General:  Alert, No acute distress.    Neuro:  Alert and oriented to person, place, and time. Able to communicate  well.      HEENT:  No JVD, no masses, Eyes anicteric, ,    Cardiovascular:  Regular rate and rhythm, with normal S1 and S2.    Lungs:  clear. no rales, no wheezing, .    Abdomen:  Normoactive bowel sounds. Soft, flat, non-tender, and non-distended.  positive bowel sounds    Skin:  Warm, dry    Extremities:  warm,  no cyanosis    LABS:                                                 11.3   11.73 )-----------( 242      ( 2023 06:04 )             35.0                         13.0   8.72  )-----------( 278      ( 2023 18:20 )             40.2                           11.3   11.73 )-----------( 242      ( 2023 06:04 )             35.0     01-06    143  |  115<H>  |  48<H>  ----------------------------<  90  3.9   |  25  |  2.22<H>    Ca    13.6<HH>      2023 08:46  [place] Ca 14.6  Phos  2.3     01-06  Mg     1.7     -    TPro  6.3  /  Alb  3.3  /  TBili  0.6  /  DBili  x   /  AST  18  /  ALT  23  /  AlkPhos  55      141  |  111<H>  |  55<H>  ----------------------------<  113<H>  3.6   |  25  |  2.51<H>    Ca    14.5<HH>      2023 06:04  Phos  2.7     01-04  Mg     1.7     -    TPro  6.3  /  Alb  3.3  /  TBili  0.6  /  DBili  x   /  AST  18  /  ALT  23  /  AlkPhos  55        Urinalysis Basic - ( 2023 08:00 )    Color: Yellow / Appearance: Clear / S.020 / pH: x  Gluc: x / Ketone: Negative  / Bili: Negative / Urobili: Negative   Blood: x / Protein: Negative / Nitrite: Negative   Leuk Esterase: Negative / RBC: 6-10 /HPF / WBC Negative /HPF   Sq Epi: x / Non Sq Epi: Negative / Bacteria: Negative      Intact PTH: 481 pg/mL ( @ 06:04)  Vitamin D, 25-Hydroxy: 64.9 ng/mL ( @ 06:04)       NEPHROLOGY CONSULT  HPI:  76 yo male with PMHx HTN, HLD, BPH, OCD and Hypothyroidism presents today to [facility] sent by his PCP for hypercalcemia.  Patient mentions that for the past couple of days he has had no apettite and he has been constipated, with pain on both knees and feeling really weak on both legs so he decided to go ot his PCP and was told to stop taking his diuretic and to come to the ED.  Patient denies any chest pain, palpitaitons, SOB, lightheadedness, dizziness, fever, chills,     In the ED VSS,  Calcium was 15.9, Cr 2.54, BUN 55, GFR 26 and potassium 3.4.    EKG showed:  Patient was given 2L NS and Calcitonin injection.      Nephrology:  Above information obtained from chart  75 yo m h/o prostate Ca, HLD. CKD, elevated PTH.  Pt presented with  and Ca 15.9, on calcitonin and IVF  Case d/w Pts wife and daughter  Pt has been feeling weak recently, white spots in front of eyes  EGFR 26    1/6  Calcium 13.6  Creat down 2.22 mg/ dl, EGFR: 30 ml/min  on Calcitonin q 6 x 4 doses  IVF continued  neck US reviewed with Dr BOB LOPEZ thyroid lobe with 2 CM nodule          PAST MEDICAL & SURGICAL HISTORY:  Hypertension      Hyperlipidemia      HTN (hypertension)          FAMILY HISTORY:      MEDICATIONS  (STANDING):  amLODIPine   Tablet 5 milliGRAM(s) Oral daily  aspirin enteric coated 81 milliGRAM(s) Oral daily  cinacalcet 30 milliGRAM(s) Oral two times a day  doxazosin 4 milliGRAM(s) Oral at bedtime  methimazole 5 milliGRAM(s) Oral daily  metoclopramide 5 milliGRAM(s) Oral once  PARoxetine 50 milliGRAM(s) Oral daily  sodium chloride 0.9%. 1000 milliLiter(s) (125 mL/Hr) IV Continuous <Continuous>  sodium chloride 0.9%. 1000 milliLiter(s) (125 mL/Hr) IV Continuous <Continuous>  sodium chloride 0.9%. 1000 milliLiter(s) (125 mL/Hr) IV Continuous <Continuous>    MEDICATIONS  (PRN):  acetaminophen     Tablet .. 650 milliGRAM(s) Oral every 6 hours PRN Mild Pain (1 - 3)  aluminum hydroxide/magnesium hydroxide/simethicone Suspension 30 milliLiter(s) Oral every 4 hours PRN Dyspepsia  melatonin 3 milliGRAM(s) Oral at bedtime PRN Insomnia  ondansetron Injectable 4 milliGRAM(s) IV Push every 8 hours PRN Nausea and/or Vomiting      Allergies    No Known Allergies    Intolerances        I&O's Summary        REVIEW OF SYSTEMS:    Weakness  Otherwise Neg      Vital Signs Last 24 Hrs  T(C): 36.9 (2023 15:20), Max: 37 (2023 16:00)  T(F): 98.5 (2023 15:20), Max: 98.6 (2023 16:00)  HR: 80 (2023 15:20) (70 - 80)  BP: 166/73 (2023 15:20) (150/85 - 166/73)  BP(mean): --  RR: 18 (2023 15:20) (18 - 18)  SpO2: 100% (2023 15:20) (96% - 100%)    Parameters below as of 2023 15:20  Patient On (Oxygen Delivery Method): room air          I&O's Detail     ml      PHYSICAL EXAM:    General:  Alert, No acute distress.    Neuro:  Alert and oriented to person, place, and time. Able to communicate  well.      HEENT:  No JVD, no masses, Eyes anicteric, ,    Cardiovascular:  Regular rate and rhythm, with normal S1 and S2.    Lungs:  clear. no rales, no wheezing, .    Abdomen:  Normoactive bowel sounds. Soft, flat, non-tender, and non-distended.  positive bowel sounds    Skin:  Warm, dry    Extremities:  warm,  no cyanosis    LABS:                                                 11.3   11.73 )-----------( 242      ( 2023 06:04 )             35.0                         13.0   8.72  )-----------( 278      ( 2023 18:20 )             40.2                           11.3   11.73 )-----------( 242      ( 2023 06:04 )             35.0     01-06    143  |  115<H>  |  48<H>  ----------------------------<  90  3.9   |  25  |  2.22<H>    Ca    13.6<HH>      2023 08:46  [place] Ca 14.6  Phos  2.3     01-06  Mg     1.7     -    TPro  6.3  /  Alb  3.3  /  TBili  0.6  /  DBili  x   /  AST  18  /  ALT  23  /  AlkPhos  55      141  |  111<H>  |  55<H>  ----------------------------<  113<H>  3.6   |  25  |  2.51<H>    Ca    14.5<HH>      2023 06:04  Phos  2.7     01-04  Mg     1.7     -    TPro  6.3  /  Alb  3.3  /  TBili  0.6  /  DBili  x   /  AST  18  /  ALT  23  /  AlkPhos  55        Urinalysis Basic - ( 2023 08:00 )    Color: Yellow / Appearance: Clear / S.020 / pH: x  Gluc: x / Ketone: Negative  / Bili: Negative / Urobili: Negative   Blood: x / Protein: Negative / Nitrite: Negative   Leuk Esterase: Negative / RBC: 6-10 /HPF / WBC Negative /HPF   Sq Epi: x / Non Sq Epi: Negative / Bacteria: Negative      Intact PTH: 481 pg/mL ( @ 06:04)  Vitamin D, 25-Hydroxy: 64.9 ng/mL ( @ 06:04)

## 2023-01-06 NOTE — PROGRESS NOTE ADULT - ASSESSMENT
76 yo male with PMHx HTN, HLD, BPH, OCD and Hyperthyroidism presents today to [facility] sent by his PCP for hypercalcemia. admitted for:     #Severe Hypercalcemia  - PTH elevated 481  - Calcium on admission 15.9, corrected Ca 16.3  - s/p 2L IVF and Calcitonin 310 UI in the ED  - Will continue IVF at 125cc/hr   - Continue Calcitonin 4UI/KG q12hrs, if no improvement consider increasing to 8UI/hr  - f/u urine calcium, protein Electrophoresis, PTH, PTHr to r/o other causes  - f/u Nephro Consult   - CT abdomen and Pelvis for Bone mets with history of prostate cancer. Negative.   - PTH elevated, may have malignancy, Heme Onc Consult   - Follow up Parathyroid Scan     #CASPER 2/2 Hypercalcemia +dehydration  - On admission Cr 2.54 and GFR 26, unknown baseline  - s/p 2L IVF bolus  - Continue IVF at 125cc/hr  - Avoid nephrotoxic drugs  - f/u US Kidney and Bladder  - f/u Neprho consult    #HTN  - BP stable  - Will hold indapamide and Lisinopril  - Continue to trend    #Constipation  - 2/2 severe hypercalcemia  - Will must likely improve once hypercalcemia resolves  - consider Senna and Miralax regimen if no improvement after resolution of Ca levels    #Hyperthyrodism  - Continue Methimazole qd  - f/u TSH    #OCD  - Continue home meds    #DVT ppx  - Improved VTE score: 1  - SCDs  - Encourage ambulation     #Code status  - Full code  - Emergency contact: Wife Ms. OdomLyly [phone]   76 yo male with PMHx HTN, HLD, BPH, OCD and Hyperthyroidism presents today to [facility] sent by his PCP for hypercalcemia. admitted for:     #Severe Hypercalcemia  - PTH elevated 481  - Calcium on admission 15.9, corrected Ca 16.3  - s/p 2L IVF and Calcitonin 310 UI in the ED  - Will continue IVF at 125cc/hr   - s/p calcitonin 310 u Q12 completed --4 doses in total  - f/u Nephro Consult appreciated   - CT abdomen and Pelvis for Bone mets with history of prostate cancer Negative.   - PTH elevated, may have malignancy, Heme Onc Consult  - Follow up Parathyroid US scan showing 2 cm nodule along the posterior aspect of the lower pole of the left lobe may be parathyroid in origin. Further characterization can be performed with nuclear medicine sestamibi scan  - Sestimibi scan ordered      #CASPER 2/2 Hypercalcemia +dehydration  - On admission Cr 2.54 and GFR 26, unknown baseline  - s/p 2L IVF bolus  - Continue IVF at 125cc/hr  - Avoid nephrotoxic drugs  - US kidney and bladder negative  - CT abd pelvis negative    #HTN  - BP stable  - Will hold indapamide and Lisinopril  - Pt started on amlodipine 5mg QD     #Constipation  - 2/2 severe hypercalcemia  - Will must likely improve once hypercalcemia resolves  - consider Senna and Miralax regimen if no improvement after resolution of Ca levels  - Having BM    #Hyperthyrodism  - Continue Methimazole qd  - f/u TSH    #OCD  - Continue home meds    #DVT ppx  - Improved VTE score: 1  - SCDs  - Encourage ambulation     #Code status  - Full code  - Emergency contact: Wife Micheline Chavezalexsandra [phone]    Case d/w Dr. Rider

## 2023-01-06 NOTE — PROGRESS NOTE ADULT - SUBJECTIVE AND OBJECTIVE BOX
Pt has been seen and examined with FP resident, resident supervised agree with a/p       Patient is a 77y old  Male who presents with a chief complaint of Abnormal labs (06 Jan 2023 12:57)      HPI:  78 yo male with PMHx HTN, HLD, BPH, OCD and Hypothyroidism presents today to [facility] sent by his PCP for hypercalcemia.     PHYSICAL EXAM:  Vital Signs Last 24 Hrs  T(C): 36.7 (06 Jan 2023 08:10), Max: 37 (05 Jan 2023 16:00)  T(F): 98.1 (06 Jan 2023 08:10), Max: 98.6 (05 Jan 2023 16:00)  HR: 80 (06 Jan 2023 08:10) (70 - 80)  BP: 152/57 (06 Jan 2023 08:10) (150/85 - 157/67)  BP(mean): --  RR: 18 (06 Jan 2023 08:10) (18 - 18)  SpO2: 97% (06 Jan 2023 08:10) (96% - 100%)    Parameters below as of 06 Jan 2023 08:10  Patient On (Oxygen Delivery Method): room air      -[id number], cta  -[id number]-[id number] normal   -p/a-soft,bs+      A/P

## 2023-01-07 LAB
ALBUMIN SERPL ELPH-MCNC: 2.7 G/DL — LOW (ref 3.3–5)
ALP SERPL-CCNC: 47 U/L — SIGNIFICANT CHANGE UP (ref 40–120)
ALT FLD-CCNC: 20 U/L — SIGNIFICANT CHANGE UP (ref 12–78)
ANION GAP SERPL CALC-SCNC: 6 MMOL/L — SIGNIFICANT CHANGE UP (ref 5–17)
AST SERPL-CCNC: 22 U/L — SIGNIFICANT CHANGE UP (ref 15–37)
BILIRUB SERPL-MCNC: 0.6 MG/DL — SIGNIFICANT CHANGE UP (ref 0.2–1.2)
BUN SERPL-MCNC: 46 MG/DL — HIGH (ref 7–23)
CALCIUM SERPL-MCNC: 12.1 MG/DL — HIGH (ref 8.5–10.1)
CHLORIDE SERPL-SCNC: 114 MMOL/L — HIGH (ref 96–108)
CO2 SERPL-SCNC: 23 MMOL/L — SIGNIFICANT CHANGE UP (ref 22–31)
CREAT SERPL-MCNC: 1.96 MG/DL — HIGH (ref 0.5–1.3)
EGFR: 35 ML/MIN/1.73M2 — LOW
GLUCOSE SERPL-MCNC: 97 MG/DL — SIGNIFICANT CHANGE UP (ref 70–99)
HCT VFR BLD CALC: 31.2 % — LOW (ref 39–50)
HGB BLD-MCNC: 10.2 G/DL — LOW (ref 13–17)
MCHC RBC-ENTMCNC: 28.2 PG — SIGNIFICANT CHANGE UP (ref 27–34)
MCHC RBC-ENTMCNC: 32.7 GM/DL — SIGNIFICANT CHANGE UP (ref 32–36)
MCV RBC AUTO: 86.2 FL — SIGNIFICANT CHANGE UP (ref 80–100)
PLATELET # BLD AUTO: 206 K/UL — SIGNIFICANT CHANGE UP (ref 150–400)
POTASSIUM SERPL-MCNC: 3.4 MMOL/L — LOW (ref 3.5–5.3)
POTASSIUM SERPL-SCNC: 3.4 MMOL/L — LOW (ref 3.5–5.3)
PROT SERPL-MCNC: 5.3 GM/DL — LOW (ref 6–8.3)
RBC # BLD: 3.62 M/UL — LOW (ref 4.2–5.8)
RBC # FLD: 14.6 % — HIGH (ref 10.3–14.5)
SODIUM SERPL-SCNC: 143 MMOL/L — SIGNIFICANT CHANGE UP (ref 135–145)
WBC # BLD: 8.94 K/UL — SIGNIFICANT CHANGE UP (ref 3.8–10.5)
WBC # FLD AUTO: 8.94 K/UL — SIGNIFICANT CHANGE UP (ref 3.8–10.5)

## 2023-01-07 PROCEDURE — 99233 SBSQ HOSP IP/OBS HIGH 50: CPT | Mod: GC

## 2023-01-07 RX ORDER — POTASSIUM CHLORIDE 20 MEQ
40 PACKET (EA) ORAL EVERY 4 HOURS
Refills: 0 | Status: COMPLETED | OUTPATIENT
Start: 2023-01-07 | End: 2023-01-07

## 2023-01-07 RX ADMIN — CINACALCET 30 MILLIGRAM(S): 30 TABLET, FILM COATED ORAL at 21:44

## 2023-01-07 RX ADMIN — CINACALCET 30 MILLIGRAM(S): 30 TABLET, FILM COATED ORAL at 09:57

## 2023-01-07 RX ADMIN — Medication 50 MILLIGRAM(S): at 09:57

## 2023-01-07 RX ADMIN — Medication 81 MILLIGRAM(S): at 09:57

## 2023-01-07 RX ADMIN — AMLODIPINE BESYLATE 10 MILLIGRAM(S): 2.5 TABLET ORAL at 09:58

## 2023-01-07 RX ADMIN — Medication 40 MILLIEQUIVALENT(S): at 09:57

## 2023-01-07 RX ADMIN — ONDANSETRON 4 MILLIGRAM(S): 8 TABLET, FILM COATED ORAL at 16:12

## 2023-01-07 RX ADMIN — SODIUM CHLORIDE 125 MILLILITER(S): 9 INJECTION, SOLUTION INTRAVENOUS at 16:08

## 2023-01-07 RX ADMIN — Medication 40 MILLIEQUIVALENT(S): at 14:49

## 2023-01-07 RX ADMIN — Medication 4 MILLIGRAM(S): at 21:44

## 2023-01-07 NOTE — PROGRESS NOTE ADULT - ASSESSMENT
76 yo male with PMHx HTN, HLD, BPH, OCD and Hypothyroidism presents today to [facility] sent by his PCP for hypercalcemia.  Patient mentions that for the past couple of days he has had no apettite and he has been constipated, with pain on both knees and feeling really weak on both legs so he decided to go ot his PCP and was told to stop taking his diuretic and to come to the ED.  Patient denies any chest pain, palpitaitons, SOB, lightheadedness, dizziness, fever, chills,     In the ED VSS,  Calcium was 15.9, Cr 2.54, BUN 55, GFR 26 and potassium 3.4.    EKG showed:  Patient was given 2L NS and Calcitonin injection.      Nephrology:  Above information obtained from chart  77 yo m h/o prostate Ca, HLD. CKD, elevated PTH.  Pt presented with  and Ca 15.9, on calcitonin and IVF  Case d/w Pts wife and daughter  Pt has been feeling weak recently, white spots in front of eyes  EGFR 26    A/P  Evaluate for parathyroid adenoma, less likely carcinoma  The Intraoperative IPTH in chart most likely in error, and is an outpt iPTH level  pt never had a parathyroid surgery  PTH and Ca too elevated for primary hyperparathyroidsm  Will cont w Calcitonin q 6 h for total of 4 doses  IVF  Stat BMP tonight  US thyroid/ parathyroid   evaluate for bisphosphonate slow IV infusion if needed  if calcium does not improve and permitted as per renal function    1/6  Calcium 13.6, Daly Ca 14.6, Alb 2.7  Creat down 2.22 mg/ dl, EGFR: 30 ml/min  on Calcitonin q 6 x 4 doses  IVF NS @ 125  hyperchloremia developing  will switch to 1/2 NS  d/w Dr BOB Roberto, radiology, + 2 cm R lower thyroid lobe nodular lesion, suggested Sestamibi scan  Will start cinacalcet 30 mg po bid , 1st dose now  Calcitonin w  effect limited, Ca @ 13.6 since last night and   as per pharmacy zoledronic acid only available of bisphosphonates and   is contraindicated for creat clearance < 35 ml/ min   D/W Dr Marsh       78 yo male with PMHx HTN, HLD, BPH, OCD and Hypothyroidism presents today to [facility] sent by his PCP for hypercalcemia.  Patient mentions that for the past couple of days he has had no apettite and he has been constipated, with pain on both knees and feeling really weak on both legs so he decided to go ot his PCP and was told to stop taking his diuretic and to come to the ED.  Patient denies any chest pain, palpitaitons, SOB, lightheadedness, dizziness, fever, chills,     In the ED VSS,  Calcium was 15.9, Cr 2.54, BUN 55, GFR 26 and potassium 3.4.    EKG showed:  Patient was given 2L NS and Calcitonin injection.      Nephrology:  Above information obtained from chart  75 yo m h/o prostate Ca, HLD. CKD, elevated PTH.  Pt presented with  and Ca 15.9, on calcitonin and IVF  Case d/w Pts wife and daughter  Pt has been feeling weak recently, white spots in front of eyes  EGFR 26    A/P  Evaluate for parathyroid adenoma, less likely carcinoma  The Intraoperative IPTH in chart most likely in error, and is an outpt iPTH level  pt never had a parathyroid surgery  PTH and Ca too elevated for primary hyperparathyroidsm  Will cont w Calcitonin q 6 h for total of 4 doses  IVF  Stat BMP tonight  US thyroid/ parathyroid   evaluate for bisphosphonate slow IV infusion if needed  if calcium does not improve and permitted as per renal function    1/6  Calcium 13.6, Daly Ca 14.6, Alb 2.7  Creat down 2.22 mg/ dl, EGFR: 30 ml/min  on Calcitonin q 6 x 4 doses  IVF NS @ 125  hyperchloremia developing  will switch to 1/2 NS  d/w Dr BOB Roberto, radiology, + 2 cm R lower thyroid lobe nodular lesion, suggested Sestamibi scan  Will start cinacalcet 30 mg po bid , 1st dose now  Calcitonin w  effect limited, Ca @ 13.6 since last night and   as per pharmacy zoledronic acid only available of bisphosphonates and   is contraindicated for creat clearance < 35 ml/ min   D/W Dr Marsh    1/7 SY  --CASPER with Hypercalcemia     Renal function continues to slowly improve.  Continue IVF  --Hypercalcemia with possible Parathyroid adenoma : Sestamibi scan next week.     Continue cinacalcet BID.

## 2023-01-07 NOTE — PROGRESS NOTE ADULT - SUBJECTIVE AND OBJECTIVE BOX
78 yo male with PMHx HTN, HLD, BPH, OCD and Hypothyroidism presents today to [facility] sent by his PCP for hypercalcemia. Presented to the ED on 1/4/23 Patient mentions that for the past couple of days he has had no appetite and he has been constipated, with pain on both knees and feeling really weak on both legs so he decided to go ot his PCP and was told to stop taking his diuretic and to come to the ED.  Patient denies any chest pain, palpitaitons, SOB, lightheadedness, dizziness, fever, chills,     Subjective  pt seen and evaluated at bedside this AM. NAD. pt states he feels better since being admitted.       REVIEW OF SYSTEMS:  CONSTITUTIONAL: No weakness, fevers or chills  EYES/ENT: No visual changes;  No vertigo or throat pain   NECK: No pain or stiffness  RESPIRATORY: No cough, wheezing, hemoptysis; No shortness of breath  CARDIOVASCULAR: No chest pain or palpitations  GASTROINTESTINAL: No abdominal or epigastric pain. No nausea, vomiting, or hematemesis; No diarrhea or constipation. No melena or hematochezia.  GENITOURINARY: No dysuria, frequency or hematuria  NEUROLOGICAL: No numbness or weakness  SKIN: No itching, burning, rashes, or lesions   All other review of systems is negative unless indicated above    PHYSICAL EXAM:  Vital Signs Last 24 Hrs  T(C): 36.7 (07 Jan 2023 07:53), Max: 36.9 (06 Jan 2023 15:20)  T(F): 98.1 (07 Jan 2023 07:53), Max: 98.5 (06 Jan 2023 15:20)  HR: 72 (07 Jan 2023 07:53) (72 - 80)  BP: 142/66 (07 Jan 2023 07:53) (142/66 - 166/73)  BP(mean): --  RR: 17 (07 Jan 2023 07:53) (17 - 18)  SpO2: 99% (07 Jan 2023 07:53) (99% - 100%)    Parameters below as of 07 Jan 2023 07:53  Patient On (Oxygen Delivery Method): room air        Constitutional: Pt lying in bed, awake and alert, NAD  HEENT: EOMI, normal hearing, moist mucous membranes  Neck: Soft and supple, no JVD  Respiratory: CTABL, No wheezing, rales or rhonchi  Cardiovascular: S1S2+, RRR, no M/G/R  Gastrointestinal: BS+, soft, NT/ND, no guarding, no rebound  Extremities: No peripheral edema  Vascular: 2+ peripheral pulses  Neurological: AAOx3, no focal deficits  Musculoskeletal: 5/5 strength b/l upper and lower extremities  Skin: No rashes    MEDICATIONS:  MEDICATIONS  (STANDING):  amLODIPine   Tablet 10 milliGRAM(s) Oral daily  aspirin enteric coated 81 milliGRAM(s) Oral daily  cinacalcet 30 milliGRAM(s) Oral two times a day  doxazosin 4 milliGRAM(s) Oral at bedtime  methimazole 5 milliGRAM(s) Oral daily  PARoxetine 50 milliGRAM(s) Oral daily  potassium chloride    Tablet ER 40 milliEquivalent(s) Oral every 4 hours  sodium chloride 0.45%. 1000 milliLiter(s) (125 mL/Hr) IV Continuous <Continuous>      LABS: All Labs Reviewed:                        10.2   8.94  )-----------( 206      ( 07 Jan 2023 06:19 )             31.2     01-07    143  |  114<H>  |  46<H>  ----------------------------<  97  3.4<L>   |  23  |  1.96<H>    Ca    12.1<H>      07 Jan 2023 06:19  Phos  2.3     01-06    TPro  5.3<L>  /  Alb  2.7<L>  /  TBili  0.6  /  DBili  x   /  AST  22  /  ALT  20  /  AlkPhos  47  01-07          Blood Culture:   I&O's Summary    CAPILLARY BLOOD GLUCOSE          RADIOLOGY/EKG:

## 2023-01-07 NOTE — PROGRESS NOTE ADULT - SUBJECTIVE AND OBJECTIVE BOX
NEPHROLOGY INTERVAL HPI/OVERNIGHT EVENTS:    Date of Service: 01-07-23 @ 14:49    1/7--    HPI:  76 yo male with PMHx HTN, HLD, BPH, OCD and Hypothyroidism presents today to [facility] sent by his PCP for hypercalcemia.  Patient mentions that for the past couple of days he has had no apettite and he has been constipated, with pain on both knees and feeling really weak on both legs so he decided to go ot his PCP and was told to stop taking his diuretic and to come to the ED.  Patient denies any chest pain, palpitaitons, SOB, lightheadedness, dizziness, fever, chills,     In the ED VSS,  Calcium was 15.9, Cr 2.54, BUN 55, GFR 26 and potassium 3.4.    EKG showed:  Patient was given 2L NS and Calcitonin injection.      Nephrology:  Above information obtained from chart  75 yo m h/o prostate Ca, HLD. CKD, elevated PTH.  Pt presented with  and Ca 15.9, on calcitonin and IVF  Case d/w Pts wife and daughter  Pt has been feeling weak recently, white spots in front of eyes  EGFR 26    1/6  Calcium 13.6  Creat down 2.22 mg/ dl, EGFR: 30 ml/min  on Calcitonin q 6 x 4 doses  IVF continued  neck US reviewed with Dr BOB GUTIERREZ thyroid lobe with 2 CM nodule      MEDICATIONS  (STANDING):  amLODIPine   Tablet 10 milliGRAM(s) Oral daily  aspirin enteric coated 81 milliGRAM(s) Oral daily  cinacalcet 30 milliGRAM(s) Oral two times a day  doxazosin 4 milliGRAM(s) Oral at bedtime  methimazole 5 milliGRAM(s) Oral daily  PARoxetine 50 milliGRAM(s) Oral daily  potassium chloride    Tablet ER 40 milliEquivalent(s) Oral every 4 hours  sodium chloride 0.45%. 1000 milliLiter(s) (125 mL/Hr) IV Continuous <Continuous>    MEDICATIONS  (PRN):  acetaminophen     Tablet .. 650 milliGRAM(s) Oral every 6 hours PRN Mild Pain (1 - 3)  aluminum hydroxide/magnesium hydroxide/simethicone Suspension 30 milliLiter(s) Oral every 4 hours PRN Dyspepsia  melatonin 3 milliGRAM(s) Oral at bedtime PRN Insomnia  ondansetron Injectable 4 milliGRAM(s) IV Push every 8 hours PRN Nausea and/or Vomiting          Vital Signs Last 24 Hrs  T(C): 36.7 (07 Jan 2023 07:53), Max: 36.9 (06 Jan 2023 15:20)  T(F): 98.1 (07 Jan 2023 07:53), Max: 98.5 (06 Jan 2023 15:20)  HR: 72 (07 Jan 2023 07:53) (72 - 80)  BP: 142/66 (07 Jan 2023 07:53) (142/66 - 166/73)  BP(mean): --  RR: 17 (07 Jan 2023 07:53) (17 - 18)  SpO2: 99% (07 Jan 2023 07:53) (99% - 100%)    Parameters below as of 07 Jan 2023 07:53  Patient On (Oxygen Delivery Method): room air    PHYSICAL EXAM:  GENERAL:   CHEST/LUNG:   HEART:   ABDOMEN:   EXTREMITIES:   SKIN:     LABS:                        10.2   8.94  )-----------( 206      ( 07 Jan 2023 06:19 )             31.2     01-07    143  |  114<H>  |  46<H>  ----------------------------<  97  3.4<L>   |  23  |  1.96<H>    Ca    12.1<H>      07 Jan 2023 06:19  Phos  2.3     01-06    TPro  5.3<L>  /  Alb  2.7<L>  /  TBili  0.6  /  DBili  x   /  AST  22  /  ALT  20  /  AlkPhos  47  01-07                RADIOLOGY & ADDITIONAL TESTS:   NEPHROLOGY INTERVAL HPI/OVERNIGHT EVENTS:    Date of Service: 01-07-23 @ 14:49    1/7--Feeling a little better but continues with poor appetite.   No SOB.    HPI:  76 yo male with PMHx HTN, HLD, BPH, OCD and Hypothyroidism presents today to [facility] sent by his PCP for hypercalcemia.  Patient mentions that for the past couple of days he has had no apettite and he has been constipated, with pain on both knees and feeling really weak on both legs so he decided to go ot his PCP and was told to stop taking his diuretic and to come to the ED.  Patient denies any chest pain, palpitaitons, SOB, lightheadedness, dizziness, fever, chills,     In the ED VSS,  Calcium was 15.9, Cr 2.54, BUN 55, GFR 26 and potassium 3.4.    EKG showed:  Patient was given 2L NS and Calcitonin injection.      Nephrology:  Above information obtained from chart  75 yo m h/o prostate Ca, HLD. CKD, elevated PTH.  Pt presented with  and Ca 15.9, on calcitonin and IVF  Case d/w Pts wife and daughter  Pt has been feeling weak recently, white spots in front of eyes  EGFR 26    1/6  Calcium 13.6  Creat down 2.22 mg/ dl, EGFR: 30 ml/min  on Calcitonin q 6 x 4 doses  IVF continued  neck US reviewed with Dr BOB GUTIERREZ thyroid lobe with 2 CM nodule      MEDICATIONS  (STANDING):  amLODIPine   Tablet 10 milliGRAM(s) Oral daily  aspirin enteric coated 81 milliGRAM(s) Oral daily  cinacalcet 30 milliGRAM(s) Oral two times a day  doxazosin 4 milliGRAM(s) Oral at bedtime  methimazole 5 milliGRAM(s) Oral daily  PARoxetine 50 milliGRAM(s) Oral daily  potassium chloride    Tablet ER 40 milliEquivalent(s) Oral every 4 hours  sodium chloride 0.45%. 1000 milliLiter(s) (125 mL/Hr) IV Continuous <Continuous>    MEDICATIONS  (PRN):  acetaminophen     Tablet .. 650 milliGRAM(s) Oral every 6 hours PRN Mild Pain (1 - 3)  aluminum hydroxide/magnesium hydroxide/simethicone Suspension 30 milliLiter(s) Oral every 4 hours PRN Dyspepsia  melatonin 3 milliGRAM(s) Oral at bedtime PRN Insomnia  ondansetron Injectable 4 milliGRAM(s) IV Push every 8 hours PRN Nausea and/or Vomiting    Vital Signs Last 24 Hrs  T(C): 36.7 (07 Jan 2023 07:53), Max: 36.9 (06 Jan 2023 15:20)  T(F): 98.1 (07 Jan 2023 07:53), Max: 98.5 (06 Jan 2023 15:20)  HR: 72 (07 Jan 2023 07:53) (72 - 80)  BP: 142/66 (07 Jan 2023 07:53) (142/66 - 166/73)  BP(mean): --  RR: 17 (07 Jan 2023 07:53) (17 - 18)  SpO2: 99% (07 Jan 2023 07:53) (99% - 100%)    Parameters below as of 07 Jan 2023 07:53  Patient On (Oxygen Delivery Method): room air    PHYSICAL EXAM:  GENERAL: No distress  CHEST/LUNG: fair air entry  HEART: S1S2 RRR  ABDOMEN: soft  EXTREMITIES: no edema  SKIN:     LABS:                        10.2   8.94  )-----------( 206      ( 07 Jan 2023 06:19 )             31.2     01-07    143  |  114<H>  |  46<H>  ----------------------------<  97  3.4<L>   |  23  |  1.96<H>    Ca    12.1<H>      07 Jan 2023 06:19  Phos  2.3     01-06    TPro  5.3<L>  /  Alb  2.7<L>  /  TBili  0.6  /  DBili  x   /  AST  22  /  ALT  20  /  AlkPhos  47  01-07                RADIOLOGY & ADDITIONAL TESTS:

## 2023-01-07 NOTE — PROGRESS NOTE ADULT - ATTENDING COMMENTS
# ct iv fluids, calcitonin     #DVT pr
78 yo male with PMHx HTN, HLD, BPH, OCD and Hyperthyroidism presents today to [facility] sent by his PCP for hypercalcemia.     #Severe Hypercalcemia  - Possibly Medication induced, or malignancy  - Calcium on admission 15.9, corrected Ca 16.3  - s/p 2L IVF and Calcitonin 310 UI in the ED  - Will continue IVF at 125cc/hr for 24 hours   - Continue Calcitonin 4UI/KG q12hrs, if no improvement consider increasing to 8UI/hr  - f/u urine calcium, protein Electrophoresis, PTH, PTHr to r/o other causes  - f/u Nephro Consult   - CT abdomen and Pelvis for Bone mets with history of prostate cancer. Negative.   - PTH elevated, may have malignancy, Heme Onc Consult   - Follow up Parathyroid Scan
-rs-aeeb, cta  -p/a-soft, bs+  -cvs-s1s2 normal     A/P    #ct iv fluids, improving calcium now     #dvt pr

## 2023-01-07 NOTE — PROGRESS NOTE ADULT - ASSESSMENT
76 yo male with PMHx HTN, HLD, BPH, OCD and Hyperthyroidism presents today to [facility] sent by his PCP for hypercalcemia. admitted for:     #Severe Hypercalcemia  - PTH elevated 481  - Calcium on admission 15.9, corrected Ca 16.3  - s/p 2L IVF and Calcitonin 310 UI in the ED  - Will continue IVF at 125cc/hr   - s/p calcitonin 310 u Q12 completed --4 doses in total  - f/u Nephro Consult appreciated   - CT abdomen and Pelvis for Bone mets with history of prostate cancer Negative.   - PTH elevated, may have malignancy, Heme Onc Consult  - Follow up Parathyroid US scan showing 2 cm nodule along the posterior aspect of the lower pole of the left lobe may be parathyroid in origin. Further characterization can be performed with nuclear medicine sestamibi scan  - Sestimibi scan ordered  -Ca 12.1 this AM; improving       #CASPER 2/2 Hypercalcemia +dehydration  - On admission Cr 2.54 and GFR 26, unknown baseline  - s/p 2L IVF bolus  - Continue IVF at 125cc/hr  - Avoid nephrotoxic drugs  - US kidney and bladder negative  - CT abd pelvis negative  -Cr 1.96 this AM; improving    #HTN  - BP stable  - Will hold indapamide and Lisinopril  - cont amlodipine  -BP improving     #Constipation  - 2/2 severe hypercalcemia  - Will must likely improve once hypercalcemia resolves  - consider Senna and Miralax regimen if no improvement after resolution of Ca levels  - Having BM    #Hyperthyrodism  - Continue Methimazole qd  - f/u TSH    #OCD  - Continue home meds    #DVT ppx  - Improved VTE score: 1  - SCDs  - Encourage ambulation     #Code status  - Full code  - Emergency contact: Wife Lyly Chavez [phone]    Case d/w Dr. Rider          76 yo male with PMHx HTN, HLD, BPH, OCD and Hyperthyroidism presents today to [facility] sent by his PCP for hypercalcemia. admitted for:     #Severe Hypercalcemia  - PTH elevated 481  - Calcium on admission 15.9, corrected Ca 16.3  - s/p 2L IVF and Calcitonin 310 UI in the ED  - Will continue IVF at 125cc/hr   - s/p calcitonin 310 u Q12 completed --4 doses in total  - f/u Nephro Consult appreciated   - CT abdomen and Pelvis for Bone mets with history of prostate cancer Negative.   - PTH elevated, may have malignancy, Heme Onc Consult  - Follow up Parathyroid US scan showing 2 cm nodule along the posterior aspect of the lower pole of the left lobe may be parathyroid in origin. Further characterization can be performed with nuclear medicine sestamibi scan  - Sestimibi scan ordered  -Ca 12.1 this AM; improving       #CASPER 2/2 Hypercalcemia +dehydration  - On admission Cr 2.54 and GFR 26, unknown baseline  - s/p 2L IVF bolus  - Continue IVF at 125cc/hr  - Avoid nephrotoxic drugs  - US kidney and bladder negative  - CT abd pelvis negative  -Cr 1.96 this AM; improving    #HTN  - BP stable  - Will hold indapamide and Lisinopril  - increase amlodipine to 10mg  -BP improving     #Constipation  - 2/2 severe hypercalcemia  - Will must likely improve once hypercalcemia resolves  - consider Senna and Miralax regimen if no improvement after resolution of Ca levels  - Having BM    #Hyperthyrodism  - Continue Methimazole qd  - f/u TSH    #OCD  - Continue home meds    #DVT ppx  - Improved VTE score: 1  - SCDs  - Encourage ambulation     #Code status  - Full code  - Emergency contact: Wife Lyly Chavez [phone]    Case d/w Dr. Rider

## 2023-01-08 ENCOUNTER — TRANSCRIPTION ENCOUNTER (OUTPATIENT)
Age: 78
End: 2023-01-08

## 2023-01-08 VITALS
RESPIRATION RATE: 18 BRPM | DIASTOLIC BLOOD PRESSURE: 78 MMHG | SYSTOLIC BLOOD PRESSURE: 155 MMHG | TEMPERATURE: 98 F | OXYGEN SATURATION: 97 % | HEART RATE: 90 BPM

## 2023-01-08 LAB
ALBUMIN SERPL ELPH-MCNC: 2.9 G/DL — LOW (ref 3.3–5)
ALP SERPL-CCNC: 53 U/L — SIGNIFICANT CHANGE UP (ref 40–120)
ALT FLD-CCNC: 24 U/L — SIGNIFICANT CHANGE UP (ref 12–78)
ANION GAP SERPL CALC-SCNC: 5 MMOL/L — SIGNIFICANT CHANGE UP (ref 5–17)
AST SERPL-CCNC: 21 U/L — SIGNIFICANT CHANGE UP (ref 15–37)
BILIRUB SERPL-MCNC: 0.6 MG/DL — SIGNIFICANT CHANGE UP (ref 0.2–1.2)
BUN SERPL-MCNC: 35 MG/DL — HIGH (ref 7–23)
CALCIUM SERPL-MCNC: 12.3 MG/DL — HIGH (ref 8.5–10.1)
CHLORIDE SERPL-SCNC: 112 MMOL/L — HIGH (ref 96–108)
CO2 SERPL-SCNC: 24 MMOL/L — SIGNIFICANT CHANGE UP (ref 22–31)
CREAT SERPL-MCNC: 1.99 MG/DL — HIGH (ref 0.5–1.3)
EGFR: 34 ML/MIN/1.73M2 — LOW
GLUCOSE SERPL-MCNC: 99 MG/DL — SIGNIFICANT CHANGE UP (ref 70–99)
HCT VFR BLD CALC: 32.5 % — LOW (ref 39–50)
HGB BLD-MCNC: 10.6 G/DL — LOW (ref 13–17)
MCHC RBC-ENTMCNC: 28 PG — SIGNIFICANT CHANGE UP (ref 27–34)
MCHC RBC-ENTMCNC: 32.6 GM/DL — SIGNIFICANT CHANGE UP (ref 32–36)
MCV RBC AUTO: 86 FL — SIGNIFICANT CHANGE UP (ref 80–100)
PLATELET # BLD AUTO: 217 K/UL — SIGNIFICANT CHANGE UP (ref 150–400)
POTASSIUM SERPL-MCNC: 3.8 MMOL/L — SIGNIFICANT CHANGE UP (ref 3.5–5.3)
POTASSIUM SERPL-SCNC: 3.8 MMOL/L — SIGNIFICANT CHANGE UP (ref 3.5–5.3)
PROT SERPL-MCNC: 6.1 GM/DL — SIGNIFICANT CHANGE UP (ref 6–8.3)
RBC # BLD: 3.78 M/UL — LOW (ref 4.2–5.8)
RBC # FLD: 14.6 % — HIGH (ref 10.3–14.5)
SODIUM SERPL-SCNC: 141 MMOL/L — SIGNIFICANT CHANGE UP (ref 135–145)
WBC # BLD: 7.65 K/UL — SIGNIFICANT CHANGE UP (ref 3.8–10.5)
WBC # FLD AUTO: 7.65 K/UL — SIGNIFICANT CHANGE UP (ref 3.8–10.5)

## 2023-01-08 PROCEDURE — 99222 1ST HOSP IP/OBS MODERATE 55: CPT

## 2023-01-08 PROCEDURE — 99239 HOSP IP/OBS DSCHRG MGMT >30: CPT | Mod: GC

## 2023-01-08 RX ORDER — AMLODIPINE BESYLATE 2.5 MG/1
1 TABLET ORAL
Qty: 14 | Refills: 0
Start: 2023-01-08 | End: 2023-01-21

## 2023-01-08 RX ORDER — CINACALCET 30 MG/1
1 TABLET, FILM COATED ORAL
Qty: 30 | Refills: 0
Start: 2023-01-08 | End: 2023-01-22

## 2023-01-08 RX ORDER — SUCRALFATE 1 G
10 TABLET ORAL
Qty: 70 | Refills: 0
Start: 2023-01-08 | End: 2023-01-14

## 2023-01-08 RX ORDER — SUCRALFATE 1 G
10 TABLET ORAL
Qty: 0 | Refills: 0 | DISCHARGE
Start: 2023-01-08 | End: 2023-01-14

## 2023-01-08 RX ORDER — LISINOPRIL 2.5 MG/1
1 TABLET ORAL
Qty: 0 | Refills: 0 | DISCHARGE

## 2023-01-08 RX ORDER — CINACALCET 30 MG/1
1 TABLET, FILM COATED ORAL
Qty: 28 | Refills: 0
Start: 2023-01-08 | End: 2023-01-21

## 2023-01-08 RX ORDER — SUCRALFATE 1 G
10 TABLET ORAL
Qty: 140 | Refills: 0
Start: 2023-01-08 | End: 2023-01-21

## 2023-01-08 RX ORDER — CALCITRIOL 0.5 UG/1
1 CAPSULE ORAL
Qty: 0 | Refills: 0 | DISCHARGE

## 2023-01-08 RX ADMIN — Medication 50 MILLIGRAM(S): at 09:38

## 2023-01-08 RX ADMIN — AMLODIPINE BESYLATE 10 MILLIGRAM(S): 2.5 TABLET ORAL at 09:39

## 2023-01-08 RX ADMIN — CINACALCET 30 MILLIGRAM(S): 30 TABLET, FILM COATED ORAL at 09:38

## 2023-01-08 RX ADMIN — SODIUM CHLORIDE 125 MILLILITER(S): 9 INJECTION, SOLUTION INTRAVENOUS at 09:40

## 2023-01-08 RX ADMIN — Medication 81 MILLIGRAM(S): at 09:39

## 2023-01-08 NOTE — DISCHARGE NOTE PROVIDER - PROVIDER TOKENS
PROVIDER:[TOKEN:[[id number]]],PROVIDER:[TOKEN:[[id number]]],PROVIDER:[TOKEN:[[id number]]]

## 2023-01-08 NOTE — CONSULT NOTE ADULT - SUBJECTIVE AND OBJECTIVE BOX
Patient is a 77y old  Male who presents with a chief complaint of Abnormal labs (07 Jan 2023 14:49)      HPI:  76 yo male with PMHx HTN, HLD, BPH, OCD and Hypothyroidism presented to [facility] sent by his PCP for hypercalcemia.  He felt poorly with fatigue and malaise and decreased appetite prior to admission  His calcium has come down from 16.3 to 12.1  Creatinine also improving  His appetite has improved and still with some dyspepsia, epigastric pain    PAST MEDICAL & SURGICAL HISTORY:  Hypertension      Hyperlipidemia      HTN (hypertension)          MEDICATIONS  (STANDING):  amLODIPine   Tablet 10 milliGRAM(s) Oral daily  aspirin enteric coated 81 milliGRAM(s) Oral daily  cinacalcet 30 milliGRAM(s) Oral two times a day  doxazosin 4 milliGRAM(s) Oral at bedtime  methimazole 5 milliGRAM(s) Oral daily  PARoxetine 50 milliGRAM(s) Oral daily  sodium chloride 0.45%. 1000 milliLiter(s) (125 mL/Hr) IV Continuous <Continuous>    MEDICATIONS  (PRN):  acetaminophen     Tablet .. 650 milliGRAM(s) Oral every 6 hours PRN Mild Pain (1 - 3)  aluminum hydroxide/magnesium hydroxide/simethicone Suspension 30 milliLiter(s) Oral every 4 hours PRN Dyspepsia  melatonin 3 milliGRAM(s) Oral at bedtime PRN Insomnia  ondansetron Injectable 4 milliGRAM(s) IV Push every 8 hours PRN Nausea and/or Vomiting      Allergies    No Known Allergies    Intolerances        SOCIAL HISTORY:  occ etoh, no tob  FAMILY HISTORY:      REVIEW OF SYSTEMS:    CONSTITUTIONAL: weakness  EYES/ENT: No visual changes;  No vertigo or throat pain   NECK: No pain or stiffness  RESPIRATORY: No cough, wheezing, hemoptysis; No shortness of breath  CARDIOVASCULAR: No chest pain or palpitations  GASTROINTESTINAL: as above  GENITOURINARY: No dysuria, frequency or hematuria  NEUROLOGICAL: No numbness or weakness  SKIN: No itching, burning, rashes, or lesions   PSYCH: Normal mood and affect  All other review of systems is negative unless indicated above.    Vital Signs Last 24 Hrs  T(C): 36.8 (08 Jan 2023 07:33), Max: 36.8 (08 Jan 2023 07:33)  T(F): 98.2 (08 Jan 2023 07:33), Max: 98.2 (08 Jan 2023 07:33)  HR: 76 (08 Jan 2023 07:33) (68 - 76)  BP: 149/75 (08 Jan 2023 07:33) (149/75 - 166/90)  BP(mean): --  RR: 17 (08 Jan 2023 07:33) (17 - 18)  SpO2: 95% (08 Jan 2023 07:33) (95% - 97%)    Parameters below as of 08 Jan 2023 07:33  Patient On (Oxygen Delivery Method): room air        PHYSICAL EXAM:  Constitutional: NAD  HEENT: MMM  Neck: No LAD  Respiratory: CTA  Cardiovascular: S1 and S2  Gastrointestinal: BS+, soft, mild epigastric tenderness  Extremities: No peripheral edema  Vascular: 2+ peripheral pulses  Neurological: A/O x 3, no focal deficits  Psychiatric: Normal mood, normal affect  Skin: No rashes    LABS:                        10.6   7.65  )-----------( 217      ( 08 Jan 2023 08:22 )             32.5     01-08    141  |  112<H>  |  35<H>  ----------------------------<  99  3.8   |  24  |  1.99<H>    Ca    12.3<H>      08 Jan 2023 08:22    TPro  6.1  /  Alb  2.9<L>  /  TBili  0.6  /  DBili  x   /  AST  21  /  ALT  24  /  AlkPhos  53  01-08      LIVER FUNCTIONS - ( 08 Jan 2023 08:22 )  Alb: 2.9 g/dL / Pro: 6.1 gm/dL / ALK PHOS: 53 U/L / ALT: 24 U/L / AST: 21 U/L / GGT: x             RADIOLOGY & ADDITIONAL STUDIES:

## 2023-01-08 NOTE — DISCHARGE NOTE NURSING/CASE MANAGEMENT/SOCIAL WORK - PATIENT PORTAL LINK FT
You can access the FollowMyHealth Patient Portal offered by [facility] by registering at the following website: [email or web address]. By joining [facility]’s FollowMyHealth portal, you will also be able to view your health information using other applications (apps) compatible with our system.

## 2023-01-08 NOTE — DISCHARGE NOTE PROVIDER - NSDCMRMEDTOKEN_GEN_ALL_CORE_FT
amLODIPine 10 mg oral tablet: 1 tab(s) orally once a day  Aspirin Enteric Coated 81 mg oral delayed release tablet: 1 tab(s) orally once a day (in the evening)  calcitriol 0.25 mcg oral capsule: 1 cap(s) orally once a day  Carafate 1 g/10 mL oral suspension: 10 milliliter(s) orally once a day   cinacalcet 30 mg oral tablet: 1 tab(s) orally 2 times a day  Flonase 50 mcg/inh nasal spray: 2 spray(s) in each nostril once a day  lycopene oral supplement: 1 cap(s) orally once a day  methIMAzole 5 mg oral tablet: 0.5 tab(s) orally once a day  Multiple Vitamins oral tablet: 1 tab(s) orally once a day  omeprazole 20 mg oral delayed release tablet: 1 tab(s) orally once a day  PARoxetine 20 mg oral tablet: 1 tab(s) orally once a day  ***take with 30mg for TDD = 50mg***  PARoxetine 30 mg oral tablet: 1 tab(s) orally once a day  ***take with 20mg for TDD = 50mg***  pravastatin 20 mg oral tablet: 1 tab(s) orally once a day (at bedtime)  terazosin 5 mg oral capsule: 1 cap(s) orally once a day (at bedtime)  Vitamin D3: 1 cap(s) orally every other day   amLODIPine 10 mg oral tablet: 1 tab(s) orally once a day  Aspirin Enteric Coated 81 mg oral delayed release tablet: 1 tab(s) orally once a day (in the evening)  Carafate 1 g/10 mL oral suspension: 10 milliliter(s) orally once a day   cinacalcet 30 mg oral tablet: 1 tab(s) orally 2 times a day  Flonase 50 mcg/inh nasal spray: 2 spray(s) in each nostril once a day  lycopene oral supplement: 1 cap(s) orally once a day  methIMAzole 5 mg oral tablet: 0.5 tab(s) orally once a day  Multiple Vitamins oral tablet: 1 tab(s) orally once a day  omeprazole 20 mg oral delayed release tablet: 1 tab(s) orally once a day  PARoxetine 20 mg oral tablet: 1 tab(s) orally once a day  ***take with 30mg for TDD = 50mg***  PARoxetine 30 mg oral tablet: 1 tab(s) orally once a day  ***take with 20mg for TDD = 50mg***  pravastatin 20 mg oral tablet: 1 tab(s) orally once a day (at bedtime)  terazosin 5 mg oral capsule: 1 cap(s) orally once a day (at bedtime)  Vitamin D3: 1 cap(s) orally every other day

## 2023-01-08 NOTE — DISCHARGE NOTE PROVIDER - NSDCCPCAREPLAN_GEN_ALL_CORE_FT
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 your parathyroid hormone was elevated to 418. Your ultrasound showed a nodule suspicious of an adenoma. Please take cincecalcet 30 mg two times a day. For your gastroesophageal reflux, please take omeprazole as you were prescribed home and also take carafate 10ml once every day; Please make sure not to take carafate and omeprazole together. for your high blood pressure, please discontinue lisinopril and take amlodipine 10 mg once a day. Additionally, discontinue your indapamide. Please follow up with Dr. Lundy to address your hypercalcemia and hyperparathyroid problem within the next 5 days. Also follow up with your nephrologist within the next week to address your kidney function and your hypertension medication changes. Additionally follow up with your PCP Dr. Hebert Espinosa in the next 10 days for further management of your chronic conditions.   Return to the Emergency Department for worsening or persistent symptoms, and/or ANY NEW OR CONCERNING SYMPTOMS. If you have issues obtaining follow up, please call: [phone] or [phone]  to obtain a doctor or specialist who takes your insurance in your area.

## 2023-01-08 NOTE — DISCHARGE NOTE PROVIDER - NSDCCAREPROVSEEN_GEN_ALL_CORE_FT
Kasi, Sky Lombardi, Gamaliel Rider, Robson Dawson, Yaritza Bardales, Zaki Espinosa, Hebert Woodard, Chago Pemberton, Catia Pineda, Dwight Villa, Yumiko Casarez, Jace Herman, RenGowanda State Hospitalclarissaon

## 2023-01-08 NOTE — DISCHARGE NOTE PROVIDER - ATTENDING DISCHARGE PHYSICAL EXAMINATION:
-rs-aeeb, cta  -p/a-soft, bs+  -cvs-s1s2 normal     A/P    #pt is feeling better.  He has been referred and made appointment for quicker evaluation as an outpt by endocrinologist. He is being discharge with further management as an outpt

## 2023-01-08 NOTE — DISCHARGE NOTE PROVIDER - HOSPITAL COURSE
78 yo male with PMHx HTN, HLD, BPH, OCD and Hypothyroidism presents to [facility] sent by his PCP for hypercalcemia on 1/4/23. Patient mentioned that for the past couple of days he has had no appetite and he has been constipated, with pain on both knees and feeling really weak on both legs so he decided to go ot his PCP and was told to stop taking his diuretic and to come to the ED.  Patient denied any chest pain, palpitaitons, SOB, lightheadedness, dizziness, fever, chills. In the ED pt was found to be hypercalcemic to 16. Pt started on IVF and was administered calcitonin. Pt ended up vomiting from calcitonin so the infusion was stopped. Pt was admitted to the floors for further management of severe hypercalcemia. Pt labs were significant for Calcium in the 15-16s, PTH of 481, phosphorus of 2.3 and BUN and Cr elevation. Pt was evaluated by nephrology and was started on calcitonin. s/p calcitonin treatment, pt was also started on cinecalcet. Bisphosphonate was not considered d/t low Cr clearance. Over the course of pt stay, pt calcium improved to 12.3 s/p treatment with IVF, cinecalcet, and calcitonin. Pt also obtained a parathyroid US which showed 2 cm nodule along the posterior aspect of the lower pole of the left lobe may be parathyroid in origin. Pt had a sestemibi scan ordered but it was canceled due to the pt having been coordinated with an appt with Dr Low Lundy    Subjective  Today Pt was examined at bedside. Abd pain improved and pt tolerating diet    Constitutional: No fevers, chills, or sweats.  Cardiac: No chest pain, exertional dyspnea, orthopnea  Respiratory: No shortness of breath, no cough  GI: No abdominal pain, no N/V/D  Neuro: No headaches, no neck pain/stiffness, no numbness  All other systems reviewed and are negative unless otherwise stated in the HPI.    Vital Signs Last 24 Hrs  T(C): 36.8 (08 Jan 2023 07:33), Max: 36.8 (08 Jan 2023 07:33)  T(F): 98.2 (08 Jan 2023 07:33), Max: 98.2 (08 Jan 2023 07:33)  HR: 76 (08 Jan 2023 07:33) (68 - 76)  BP: 149/75 (08 Jan 2023 07:33) (149/75 - 166/90)  BP(mean): --  RR: 17 (08 Jan 2023 07:33) (17 - 18)  SpO2: 95% (08 Jan 2023 07:33) (95% - 97%)    Parameters below as of 08 Jan 2023 07:33  Patient On (Oxygen Delivery Method): room air    Constitutional: Pt lying in bed, awake and alert, NAD  HEENT: EOMI, normal hearing, moist mucous membranes  Neck: Soft and supple, no JVD  Respiratory: CTABL, No wheezing, rales or rhonchi  Cardiovascular: S1S2+,   Gastrointestinal: BS+, soft, NT/ND, no guarding, no rebound  Extremities: No peripheral edema  Vascular: 2+ peripheral pulses  Neurological: AAOx3, no focal deficits  Musculoskeletal: 5/5 strength b/l upper and lower extremities  Skin: No rashes

## 2023-01-08 NOTE — DISCHARGE NOTE PROVIDER - NSTOBACCOUSAGEY/N_GEN_A_CS
No Post-Care Instructions: I reviewed with the patient in detail post-care instructions. Patient is to keep the biopsy site dry overnight, and then apply bacitracin twice daily until healed. Patient may apply hydrogen peroxide soaks to remove any crusting.

## 2023-01-08 NOTE — DISCHARGE NOTE PROVIDER - CARE PROVIDER_API CALL
Low Lundy (MD)  Plastic Surgery  [address], [address]  [place] [address]  Phone: [phone]  Fax: [phone]  Follow Up Time:     Chago Woodard (DO)  Nephrology  [address], [address]  [place] [address]  Phone: [phone]  Fax: [phone]  Follow Up Time:     Hebert Espinosa  CARDIOVASCULAR DISEASE  [address] [place] [address]  Phone: [phone]  Fax: [phone]  Follow Up Time:

## 2023-01-08 NOTE — DISCHARGE NOTE PROVIDER - NSDCFUSCHEDAPPT_GEN_ALL_CORE_FT
[facility] Physician [facility]  ORTHOSURG [address]  Scheduled Appointment: 03/30/2023

## 2023-01-08 NOTE — CONSULT NOTE ADULT - ASSESSMENT
78yo male with hypercalcemia, likely due to parathyroid mass    calicum and creatinine improved - renal to see  today  possible d/c    outpt f/u with Dr Low Lundy neck surgeon this week  d/c on omeprazole and carafate    d/w pt, family, and team

## 2023-01-09 PROBLEM — E78.5 HYPERLIPIDEMIA, UNSPECIFIED: Chronic | Status: ACTIVE | Noted: 2023-01-05

## 2023-01-09 PROBLEM — I10 ESSENTIAL (PRIMARY) HYPERTENSION: Chronic | Status: ACTIVE | Noted: 2023-01-05

## 2023-01-10 ENCOUNTER — APPOINTMENT (OUTPATIENT)
Dept: GASTROENTEROLOGY | Facility: CLINIC | Age: 78
End: 2023-01-10
Payer: COMMERCIAL

## 2023-01-10 VITALS
HEIGHT: 69 IN | BODY MASS INDEX: 25.18 KG/M2 | WEIGHT: 170 LBS | HEART RATE: 99 BPM | DIASTOLIC BLOOD PRESSURE: 88 MMHG | SYSTOLIC BLOOD PRESSURE: 147 MMHG

## 2023-01-10 DIAGNOSIS — R10.13 EPIGASTRIC PAIN: ICD-10-CM

## 2023-01-10 DIAGNOSIS — R11.2 NAUSEA WITH VOMITING, UNSPECIFIED: ICD-10-CM

## 2023-01-10 PROCEDURE — 99214 OFFICE O/P EST MOD 30 MIN: CPT

## 2023-01-11 LAB
ALBUMIN SERPL ELPH-MCNC: 4 G/DL
ALP BLD-CCNC: 67 U/L
ALT SERPL-CCNC: 18 U/L
ANION GAP SERPL CALC-SCNC: 12 MMOL/L
AST SERPL-CCNC: 18 U/L
BASOPHILS # BLD AUTO: 0.04 K/UL
BASOPHILS NFR BLD AUTO: 0.4 %
BILIRUB SERPL-MCNC: 0.5 MG/DL
BUN SERPL-MCNC: 35 MG/DL
CALCIUM SERPL-MCNC: 14.2 MG/DL
CALCIUM SERPL-MCNC: 14.2 MG/DL
CHLORIDE SERPL-SCNC: 105 MMOL/L
CO2 SERPL-SCNC: 24 MMOL/L
CREAT SERPL-MCNC: 2.21 MG/DL
EGFR: 30 ML/MIN/1.73M2
EOSINOPHIL # BLD AUTO: 0.11 K/UL
EOSINOPHIL NFR BLD AUTO: 1.1 %
GLUCOSE SERPL-MCNC: 114 MG/DL
HCT VFR BLD CALC: 37.3 %
HGB BLD-MCNC: 12.2 G/DL
IMM GRANULOCYTES NFR BLD AUTO: 0.4 %
LYMPHOCYTES # BLD AUTO: 1.23 K/UL
LYMPHOCYTES NFR BLD AUTO: 12.1 %
MAGNESIUM SERPL-MCNC: 1.2 MG/DL
MAN DIFF?: NORMAL
MCHC RBC-ENTMCNC: 28.4 PG
MCHC RBC-ENTMCNC: 32.7 GM/DL
MCV RBC AUTO: 86.9 FL
MONOCYTES # BLD AUTO: 0.97 K/UL
MONOCYTES NFR BLD AUTO: 9.5 %
NEUTROPHILS # BLD AUTO: 7.8 K/UL
NEUTROPHILS NFR BLD AUTO: 76.5 %
PARATHYROID HORMONE INTACT: 585 PG/ML
PHOSPHATE SERPL-MCNC: 2.8 MG/DL
PLATELET # BLD AUTO: 268 K/UL
POTASSIUM SERPL-SCNC: 3.7 MMOL/L
PROT SERPL-MCNC: 6.4 G/DL
RBC # BLD: 4.29 M/UL
RBC # FLD: 15 %
SODIUM SERPL-SCNC: 141 MMOL/L
WBC # FLD AUTO: 10.19 K/UL

## 2023-01-11 NOTE — REVIEW OF SYSTEMS
[Abdominal Pain] : abdominal pain [Heartburn] : heartburn [Negative] : Heme/Lymph [Vomiting] : no vomiting [Constipation] : no constipation [Diarrhea] : no diarrhea [Melena (black stool)] : no melena [Bleeding] : no bleeding [Fecal Incontinence (soiling)] : no fecal incontinence [Bloating (gassiness)] : no bloating

## 2023-01-11 NOTE — HISTORY OF PRESENT ILLNESS
[FreeTextEntry1] : Ilia Odom is a 77 year old male presenting today with his wife for follow up from hospitalization. 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 Since discharge has been slowly improving. Is able to ambulate for short periods of time without much difficulty. Reports his appetite is still not great, but it is improving. Does note some epigastric burning after eating that has been persistent. Is taking omeprazole 40MG QD and sucralfate once daily.

## 2023-01-11 NOTE — ASSESSMENT
[FreeTextEntry1] : Plan:\par Discussed importance of adequate caloric intake despite decreased appetite. Since pt reports persistent epigastric discomfort and GERD, can increase sucralfate to 4 times daily if necessary to control symptoms. Will assess electrolytes today with labs. Pt has appt with Dr. Lundy on Thursday, instructed pt to call our office moving forward with any additional symptoms. All questions answered, seen and discussed with Dr. Mahajan. I have spent 30 minutes on this encounter.

## 2023-01-12 ENCOUNTER — INPATIENT (INPATIENT)
Facility: HOSPITAL | Age: 78
LOS: 5 days | Discharge: HOME | End: 2023-01-18
Attending: STUDENT IN AN ORGANIZED HEALTH CARE EDUCATION/TRAINING PROGRAM | Admitting: STUDENT IN AN ORGANIZED HEALTH CARE EDUCATION/TRAINING PROGRAM
Payer: COMMERCIAL

## 2023-01-12 ENCOUNTER — APPOINTMENT (OUTPATIENT)
Dept: SURGERY | Facility: CLINIC | Age: 78
End: 2023-01-12
Payer: COMMERCIAL

## 2023-01-12 VITALS
SYSTOLIC BLOOD PRESSURE: 155 MMHG | HEIGHT: 69 IN | WEIGHT: 172 LBS | DIASTOLIC BLOOD PRESSURE: 83 MMHG | BODY MASS INDEX: 25.48 KG/M2 | HEART RATE: 93 BPM

## 2023-01-12 VITALS
OXYGEN SATURATION: 99 % | SYSTOLIC BLOOD PRESSURE: 152 MMHG | RESPIRATION RATE: 18 BRPM | DIASTOLIC BLOOD PRESSURE: 96 MMHG | TEMPERATURE: 98 F | HEART RATE: 93 BPM

## 2023-01-12 DIAGNOSIS — K59.00 CONSTIPATION, UNSPECIFIED: ICD-10-CM

## 2023-01-12 DIAGNOSIS — N40.0 BENIGN PROSTATIC HYPERPLASIA WITHOUT LOWER URINARY TRACT SYMPTOMS: ICD-10-CM

## 2023-01-12 DIAGNOSIS — E78.5 HYPERLIPIDEMIA, UNSPECIFIED: ICD-10-CM

## 2023-01-12 DIAGNOSIS — D35.1 BENIGN NEOPLASM OF PARATHYROID GLAND: ICD-10-CM

## 2023-01-12 DIAGNOSIS — E86.0 DEHYDRATION: ICD-10-CM

## 2023-01-12 DIAGNOSIS — Z00.00 ENCOUNTER FOR GENERAL ADULT MEDICAL EXAMINATION W/OUT ABNORMAL FINDINGS: ICD-10-CM

## 2023-01-12 DIAGNOSIS — N17.9 ACUTE KIDNEY FAILURE, UNSPECIFIED: ICD-10-CM

## 2023-01-12 DIAGNOSIS — E03.9 HYPOTHYROIDISM, UNSPECIFIED: ICD-10-CM

## 2023-01-12 DIAGNOSIS — F42.9 OBSESSIVE-COMPULSIVE DISORDER, UNSPECIFIED: ICD-10-CM

## 2023-01-12 DIAGNOSIS — E83.52 HYPERCALCEMIA: ICD-10-CM

## 2023-01-12 DIAGNOSIS — I10 ESSENTIAL (PRIMARY) HYPERTENSION: ICD-10-CM

## 2023-01-12 DIAGNOSIS — K21.9 GASTRO-ESOPHAGEAL REFLUX DISEASE WITHOUT ESOPHAGITIS: ICD-10-CM

## 2023-01-12 PROBLEM — R11.2 NAUSEA & VOMITING: Status: ACTIVE | Noted: 2023-01-12

## 2023-01-12 LAB
25(OH)D3 SERPL-MCNC: 75.6 NG/ML
ALBUMIN SERPL ELPH-MCNC: 4 G/DL
ALBUMIN SERPL ELPH-MCNC: 4.1 G/DL — SIGNIFICANT CHANGE UP (ref 3.3–5)
ALP BLD-CCNC: 69 U/L
ALP SERPL-CCNC: 64 U/L — SIGNIFICANT CHANGE UP (ref 40–120)
ALT FLD-CCNC: 19 U/L — SIGNIFICANT CHANGE UP (ref 4–41)
ALT SERPL-CCNC: 20 U/L
ANION GAP SERPL CALC-SCNC: 10 MMOL/L
ANION GAP SERPL CALC-SCNC: 11 MMOL/L — SIGNIFICANT CHANGE UP (ref 7–14)
APPEARANCE UR: CLEAR — SIGNIFICANT CHANGE UP
AST SERPL-CCNC: 17 U/L
AST SERPL-CCNC: 18 U/L — SIGNIFICANT CHANGE UP (ref 4–40)
BASOPHILS # BLD AUTO: 0.02 K/UL — SIGNIFICANT CHANGE UP (ref 0–0.2)
BASOPHILS NFR BLD AUTO: 0.2 % — SIGNIFICANT CHANGE UP (ref 0–2)
BILIRUB SERPL-MCNC: 0.4 MG/DL — SIGNIFICANT CHANGE UP (ref 0.2–1.2)
BILIRUB SERPL-MCNC: 0.5 MG/DL
BILIRUB UR-MCNC: NEGATIVE — SIGNIFICANT CHANGE UP
BLD GP AB SCN SERPL QL: NEGATIVE — SIGNIFICANT CHANGE UP
BUN SERPL-MCNC: 35 MG/DL — HIGH (ref 7–23)
BUN SERPL-MCNC: 38 MG/DL
CALCIUM SERPL-MCNC: 14.2 MG/DL — CRITICAL HIGH (ref 8.4–10.5)
CALCIUM SERPL-MCNC: 14.9 MG/DL
CALCIUM SERPL-MCNC: 14.9 MG/DL
CHLORIDE SERPL-SCNC: 103 MMOL/L — SIGNIFICANT CHANGE UP (ref 98–107)
CHLORIDE SERPL-SCNC: 104 MMOL/L
CO2 SERPL-SCNC: 26 MMOL/L
CO2 SERPL-SCNC: 27 MMOL/L — SIGNIFICANT CHANGE UP (ref 22–31)
COLOR SPEC: SIGNIFICANT CHANGE UP
CREAT SERPL-MCNC: 2.24 MG/DL — HIGH (ref 0.5–1.3)
CREAT SERPL-MCNC: 2.34 MG/DL
DIFF PNL FLD: NEGATIVE — SIGNIFICANT CHANGE UP
EGFR: 28 ML/MIN/1.73M2
EGFR: 30 ML/MIN/1.73M2 — LOW
EOSINOPHIL # BLD AUTO: 0.07 K/UL — SIGNIFICANT CHANGE UP (ref 0–0.5)
EOSINOPHIL NFR BLD AUTO: 0.8 % — SIGNIFICANT CHANGE UP (ref 0–6)
FLUAV AG NPH QL: SIGNIFICANT CHANGE UP
FLUBV AG NPH QL: SIGNIFICANT CHANGE UP
GGT SERPL-CCNC: 17 U/L — SIGNIFICANT CHANGE UP (ref 8–61)
GLUCOSE SERPL-MCNC: 120 MG/DL
GLUCOSE SERPL-MCNC: 125 MG/DL — HIGH (ref 70–99)
GLUCOSE UR QL: NEGATIVE — SIGNIFICANT CHANGE UP
HCT VFR BLD CALC: 34 % — LOW (ref 39–50)
HGB BLD-MCNC: 10.9 G/DL — LOW (ref 13–17)
IANC: 6.93 K/UL — SIGNIFICANT CHANGE UP (ref 1.8–7.4)
IMM GRANULOCYTES NFR BLD AUTO: 0.3 % — SIGNIFICANT CHANGE UP (ref 0–0.9)
KETONES UR-MCNC: NEGATIVE — SIGNIFICANT CHANGE UP
LEUKOCYTE ESTERASE UR-ACNC: NEGATIVE — SIGNIFICANT CHANGE UP
LIDOCAIN IGE QN: 14 U/L — SIGNIFICANT CHANGE UP (ref 7–60)
LYMPHOCYTES # BLD AUTO: 0.91 K/UL — LOW (ref 1–3.3)
LYMPHOCYTES # BLD AUTO: 10.6 % — LOW (ref 13–44)
MAGNESIUM SERPL-MCNC: 1.3 MG/DL — LOW (ref 1.6–2.6)
MCHC RBC-ENTMCNC: 27.3 PG — SIGNIFICANT CHANGE UP (ref 27–34)
MCHC RBC-ENTMCNC: 32.1 GM/DL — SIGNIFICANT CHANGE UP (ref 32–36)
MCV RBC AUTO: 85 FL — SIGNIFICANT CHANGE UP (ref 80–100)
MONOCYTES # BLD AUTO: 0.65 K/UL — SIGNIFICANT CHANGE UP (ref 0–0.9)
MONOCYTES NFR BLD AUTO: 7.5 % — SIGNIFICANT CHANGE UP (ref 2–14)
NEUTROPHILS # BLD AUTO: 6.93 K/UL — SIGNIFICANT CHANGE UP (ref 1.8–7.4)
NEUTROPHILS NFR BLD AUTO: 80.6 % — HIGH (ref 43–77)
NITRITE UR-MCNC: NEGATIVE — SIGNIFICANT CHANGE UP
NRBC # BLD: 0 /100 WBCS — SIGNIFICANT CHANGE UP (ref 0–0)
NRBC # FLD: 0 K/UL — SIGNIFICANT CHANGE UP (ref 0–0)
PARATHYROID HORMONE INTACT: 540 PG/ML
PH UR: 6 — SIGNIFICANT CHANGE UP (ref 5–8)
PHOSPHATE SERPL-MCNC: 2.6 MG/DL — SIGNIFICANT CHANGE UP (ref 2.5–4.5)
PLATELET # BLD AUTO: 239 K/UL — SIGNIFICANT CHANGE UP (ref 150–400)
POTASSIUM SERPL-MCNC: 3.3 MMOL/L — LOW (ref 3.5–5.3)
POTASSIUM SERPL-SCNC: 3.3 MMOL/L — LOW (ref 3.5–5.3)
POTASSIUM SERPL-SCNC: 3.4 MMOL/L
PROT SERPL-MCNC: 6.5 G/DL
PROT SERPL-MCNC: 6.6 G/DL — SIGNIFICANT CHANGE UP (ref 6–8.3)
PROT UR-MCNC: NEGATIVE — SIGNIFICANT CHANGE UP
PTH-INTACT FLD-MCNC: 476 PG/ML — HIGH (ref 15–65)
RBC # BLD: 4 M/UL — LOW (ref 4.2–5.8)
RBC # FLD: 14.7 % — HIGH (ref 10.3–14.5)
RH IG SCN BLD-IMP: POSITIVE — SIGNIFICANT CHANGE UP
RSV RNA NPH QL NAA+NON-PROBE: SIGNIFICANT CHANGE UP
SARS-COV-2 RNA SPEC QL NAA+PROBE: SIGNIFICANT CHANGE UP
SODIUM SERPL-SCNC: 139 MMOL/L
SODIUM SERPL-SCNC: 141 MMOL/L — SIGNIFICANT CHANGE UP (ref 135–145)
SP GR SPEC: 1.01 — LOW (ref 1.01–1.05)
T3 SERPL-MCNC: 80 NG/DL
T4 FREE SERPL-MCNC: 1.4 NG/DL
TSH SERPL-ACNC: 0.49 UIU/ML
TSH SERPL-MCNC: 0.4 UIU/ML — SIGNIFICANT CHANGE UP (ref 0.27–4.2)
UROBILINOGEN FLD QL: SIGNIFICANT CHANGE UP
WBC # BLD: 8.61 K/UL — SIGNIFICANT CHANGE UP (ref 3.8–10.5)
WBC # FLD AUTO: 8.61 K/UL — SIGNIFICANT CHANGE UP (ref 3.8–10.5)

## 2023-01-12 PROCEDURE — 31575 DIAGNOSTIC LARYNGOSCOPY: CPT

## 2023-01-12 PROCEDURE — 99204 OFFICE O/P NEW MOD 45 MIN: CPT | Mod: 25

## 2023-01-12 PROCEDURE — 99285 EMERGENCY DEPT VISIT HI MDM: CPT

## 2023-01-12 PROCEDURE — 36415 COLL VENOUS BLD VENIPUNCTURE: CPT

## 2023-01-12 PROCEDURE — 71045 X-RAY EXAM CHEST 1 VIEW: CPT | Mod: 26

## 2023-01-12 RX ORDER — PAMIDRONATE DISODIUM 9 MG/ML
60 INJECTION, SOLUTION INTRAVENOUS ONCE
Refills: 0 | Status: COMPLETED | OUTPATIENT
Start: 2023-01-12 | End: 2023-01-12

## 2023-01-12 RX ORDER — TERAZOSIN 5 MG/1
5 CAPSULE ORAL
Refills: 0 | Status: ACTIVE | COMMUNITY

## 2023-01-12 RX ORDER — PAROXETINE HYDROCHLORIDE 30 MG/1
30 TABLET, FILM COATED ORAL
Refills: 0 | Status: ACTIVE | COMMUNITY

## 2023-01-12 RX ORDER — SODIUM CHLORIDE 9 MG/ML
1000 INJECTION INTRAMUSCULAR; INTRAVENOUS; SUBCUTANEOUS ONCE
Refills: 0 | Status: COMPLETED | OUTPATIENT
Start: 2023-01-12 | End: 2023-01-12

## 2023-01-12 RX ORDER — METOCLOPRAMIDE HCL 10 MG
10 TABLET ORAL ONCE
Refills: 0 | Status: COMPLETED | OUTPATIENT
Start: 2023-01-12 | End: 2023-01-12

## 2023-01-12 RX ORDER — PRAVASTATIN SODIUM 20 MG/1
20 TABLET ORAL
Refills: 0 | Status: ACTIVE | COMMUNITY

## 2023-01-12 RX ORDER — FAMOTIDINE 10 MG/ML
20 INJECTION INTRAVENOUS ONCE
Refills: 0 | Status: COMPLETED | OUTPATIENT
Start: 2023-01-12 | End: 2023-01-12

## 2023-01-12 RX ORDER — CALCITONIN SALMON 200 [IU]/ML
310 INJECTION, SOLUTION INTRAMUSCULAR EVERY 12 HOURS
Refills: 0 | Status: DISCONTINUED | OUTPATIENT
Start: 2023-01-12 | End: 2023-01-13

## 2023-01-12 RX ADMIN — Medication 10 MILLIGRAM(S): at 18:27

## 2023-01-12 RX ADMIN — SODIUM CHLORIDE 1000 MILLILITER(S): 9 INJECTION INTRAMUSCULAR; INTRAVENOUS; SUBCUTANEOUS at 18:27

## 2023-01-12 RX ADMIN — CALCITONIN SALMON 310 INTERNATIONAL UNIT(S): 200 INJECTION, SOLUTION INTRAMUSCULAR at 20:34

## 2023-01-12 RX ADMIN — PAMIDRONATE DISODIUM 64.17 MILLIGRAM(S): 9 INJECTION, SOLUTION INTRAVENOUS at 20:33

## 2023-01-12 RX ADMIN — SODIUM CHLORIDE 1000 MILLILITER(S): 9 INJECTION INTRAMUSCULAR; INTRAVENOUS; SUBCUTANEOUS at 23:07

## 2023-01-12 RX ADMIN — FAMOTIDINE 20 MILLIGRAM(S): 10 INJECTION INTRAVENOUS at 18:28

## 2023-01-12 NOTE — ED PROVIDER NOTE - ATTENDING CONTRIBUTION TO CARE
The patient is a 77y Male who has a past medical and surgery history of Prostate CA treated HTN PTED with s/s of hypercalcemia in setting of CA >13 (corrected) and intact PTH of 481; sent in by PMD (Dr MARILYNN Lundy) for admission    Vital Signs Last 24 Hrs  T(F): 98 HR: 72 BP: 157/83 RR: 18 SpO2: 99% (2023 19:09) (99% - 99%)  PE: as described; my additions and exceptions are noted in the chart    DATA:  EKG:   LAB: Pending at time of evaluation                        10.9   8.61  )-----------( 239      ( 2023 18:00 )             34.0   Mean Cell Volume: 85.0 fL (23 @ 18:00)  Auto Neutrophil %: 80.6 % (23 @ 18:00)  Auto Eosinophil %: 0.8 % (23 @ 18:00)      141  |  103  |  35<H>  ----------------------------<  125<H>  3.3<L>   |  27  |  2.24<H>    Ca    14.2<HH>      2023 18:00  Phos  2.6       Mg     1.30         TPro  6.6  /  Alb  4.1  /  TBili  0.4  /  DBili  x   /  AST  18  /  ALT  19  /  AlkPhos  64    Lipase, Serum: 14 U/L (23 @ 18:00)     Urinalysis Basic - ( 2023 19:41 )  Color: Light Yellow / Appearance: Clear / S.009 / pH: x  Gluc: x / Ketone: Negative  / Bili: Negative / Urobili: <2 mg/dL   Blood: x / Protein: Negative / Nitrite: Negative   Leuk Esterase: Negative / RBC: x / WBC x   Sq Epi: x / Non Sq Epi: x / Bacteria: x    IMPRESSION/RISK:  Dx=Hypercalcemia 2/2 elevated PTH   Consideration include IVFS for volume replacment TBA for parathyroidectomy   Plan  calcitonin Injectable 310 International Unit(s) IntraMuscular  consider pamidronate  surgery coverage to eval  TBA either to surgery vs medicine with consult

## 2023-01-12 NOTE — ED PROVIDER NOTE - CLINICAL SUMMARY MEDICAL DECISION MAKING FREE TEXT BOX
77-year-old male with a past medical history of HTN, HLD, BPH, OCD and Hypothyroidism presents to the ED sent by his PCP for hypercalcemia. Initial vitals nonactionable.  Normotensive, normal heart rate, satting 100%.  Physical exam as noted above.  Ill-appearing male, no acute distress.  Abdomen is soft nondistended nontender.  According to the daughter at bedside, patient is more confused than usual.  Patient endorsing joint pain however no gross deformities, full passive and active range of motion in all extremities.  Differential diagnosis includes but not limited to hypercalcemia secondary to underlying malignancy or primary hyperparathyroidism versus viral syndrome.  Will order labs, EKG, meds, imaging, and reassess

## 2023-01-12 NOTE — CONSULT NOTE ADULT - ASSESSMENT
77M presenting with symptomatic hypercalcemia.    Recommendations:  - Patient would benefit from IV hydration and biphosphonates to lower his calcium levels.  - Endocrine consultation for hypercalcemia work-up  - Endocrine surgery will continue to follow.  - No acute surgical interventions at this time.    Discussed with attending surgeon Dr. Lnudy.    LANG Bedolla, PGY-3  [facility]  C Team Surgery  [id number]

## 2023-01-12 NOTE — CONSULT NOTE ADULT - SUBJECTIVE AND OBJECTIVE BOX
General Surgery Consult Note  Attending: Dr. Lundy  Service: C Team Surgery  [id number]    HPI: 77M w/ PMHx of HTN, HLD, BPH, and Hypothyroidism who presents to the ED after being found to be hypercalcemic on outside hospital blood work. Patient states he has been experiencing generalized weakned and headaches in the past week.     In the ED, patient was afebrile, hemodynamically stable, labs remarkable for hypercalcemia of 14 and azotemia, otherwise unremarkable. Endocrine surgery consulted for evaluation.    PAST MEDICAL & SURGICAL HISTORY:  HTN (hypertension)    ALLERGIES:  Allergies    PHYSICAL EXAM:  General: NAD, resting comfortably  HEENT: NC/AT, EOMI, normal hearing, no oral lesions, no LAD, neck supple  Pulmonary: normal resp effort, CTA-B  Cardiovascular: NSR, no murmurs  Abdominal: soft, ND/NT, no organomegaly  Extremities: WWP, normal strength, no clubbing/cyanosis/edema  Neuro: A/O x 3, CNs II-XII grossly intact, normal sensation, no focal deficits  Pulses: palpable distal pulses    VITAL SIGNS:  Vital Signs Last 24 Hrs  T(C): 36.7 (2023 19:09), Max: 36.7 (2023 19:09)  T(F): 98 (2023 19:09), Max: 98 (2023 19:09)  HR: 72 (2023 19:09) (72 - 93)  BP: 157/83 (2023 19:09) (152/96 - 157/83)  BP(mean): --  RR: 18 (2023 19:09) (18 - 18)  SpO2: 99% (2023 19:09) (99% - 99%)    Parameters below as of 2023 19:09  Patient On (Oxygen Delivery Method): room air    I&O's Summary    LABS:                        10.9   8.61  )-----------( 239      ( 2023 18:00 )             34.0     01-12    141  |  103  |  35<H>  ----------------------------<  125<H>  3.3<L>   |  27  |  2.24<H>    Ca    14.2<HH>      2023 18:00  Phos  2.6     -  Mg     1.30         TPro  6.6  /  Alb  4.1  /  TBili  0.4  /  DBili  x   /  AST  18  /  ALT  19  /  AlkPhos  64        Urinalysis Basic - ( 2023 19:41 )    Color: Light Yellow / Appearance: Clear / S.009 / pH: x  Gluc: x / Ketone: Negative  / Bili: Negative / Urobili: <2 mg/dL   Blood: x / Protein: Negative / Nitrite: Negative   Leuk Esterase: Negative / RBC: x / WBC x   Sq Epi: x / Non Sq Epi: x / Bacteria: x    LIVER FUNCTIONS - ( 2023 18:00 )  Alb: 4.1 g/dL / Pro: 6.6 g/dL / ALK PHOS: 64 U/L / ALT: 19 U/L / AST: 18 U/L / GGT: 17 U/L    General Surgery Consult Note  Attending: Dr. Lundy  Service: C Team Surgery  [id number]    HPI: 77M w/ PMHx of HTN, HLD, BPH, and Hypothyroidism who presents to the ED after being found to be hypercalcemic on outside hospital blood work. Patient states he has been experiencing generalized weakned and headaches in the past week.     In the ED, patient was afebrile, hemodynamically stable, labs remarkable for elevated PTH to 400s, hypercalcemia of 14 and azotemia, otherwise unremarkable. Endocrine surgery consulted for evaluation.    PAST MEDICAL & SURGICAL HISTORY:  HTN (hypertension)    ALLERGIES:  Allergies    PHYSICAL EXAM:  General: NAD, resting comfortably  HEENT: NC/AT, EOMI, normal hearing, no oral lesions, no LAD, neck supple  Pulmonary: normal resp effort, CTA-B  Cardiovascular: NSR, no murmurs  Abdominal: soft, ND/NT, no organomegaly  Extremities: WWP, normal strength, no clubbing/cyanosis/edema  Neuro: A/O x 3, CNs II-XII grossly intact, normal sensation, no focal deficits  Pulses: palpable distal pulses    VITAL SIGNS:  Vital Signs Last 24 Hrs  T(C): 36.7 (2023 19:09), Max: 36.7 (2023 19:09)  T(F): 98 (2023 19:09), Max: 98 (2023 19:09)  HR: 72 (2023 19:09) (72 - 93)  BP: 157/83 (2023 19:09) (152/96 - 157/83)  BP(mean): --  RR: 18 (2023 19:09) (18 - 18)  SpO2: 99% (2023 19:09) (99% - 99%)    Parameters below as of 2023 19:09  Patient On (Oxygen Delivery Method): room air    I&O's Summary    LABS:                        10.9   8.61  )-----------( 239      ( 2023 18:00 )             34.0     01-12    141  |  103  |  35<H>  ----------------------------<  125<H>  3.3<L>   |  27  |  2.24<H>    Ca    14.2<HH>      2023 18:00  Phos  2.6     -  Mg     1.30         TPro  6.6  /  Alb  4.1  /  TBili  0.4  /  DBili  x   /  AST  18  /  ALT  19  /  AlkPhos  64        Urinalysis Basic - ( 2023 19:41 )    Color: Light Yellow / Appearance: Clear / S.009 / pH: x  Gluc: x / Ketone: Negative  / Bili: Negative / Urobili: <2 mg/dL   Blood: x / Protein: Negative / Nitrite: Negative   Leuk Esterase: Negative / RBC: x / WBC x   Sq Epi: x / Non Sq Epi: x / Bacteria: x    LIVER FUNCTIONS - ( 2023 18:00 )  Alb: 4.1 g/dL / Pro: 6.6 g/dL / ALK PHOS: 64 U/L / ALT: 19 U/L / AST: 18 U/L / GGT: 17 U/L

## 2023-01-12 NOTE — ED PROVIDER NOTE - PHYSICAL EXAMINATION
GENERAL: no acute distress, ill appearing  HEAD: normocephalic, atraumatic  HEENT: normal conjunctiva, oral mucosa moist, neck supple  CARDIAC: regular rate and rhythm, normal S1 and S2,  no appreciable murmurs  PULM: clear to ascultation bilaterally, no crackles, rales, rhonchi, or wheezing  GI: abdomen nondistended, soft, nontender, no guarding or rebound tenderness  : no CVA tenderness, no suprapubic tenderness  NEURO: alert and oriented x 3, normal speech, PERRLA, EOMI, no focal motor or sensory deficits  MSK: no visible deformities, no peripheral edema, calf tenderness/redness/swelling  SKIN: no visible rashes, dry, well-perfused

## 2023-01-12 NOTE — ED PROVIDER NOTE - OBJECTIVE STATEMENT
77-year-old male with a past medical history of HTN, HLD, BPH, OCD and Hypothyroidism presents to the ED sent by his PCP for hypercalcemia. Patient was noted to have a calcium of 16 on outpatient lab and instructed come to the ED for further evaluation.  As per daughter at bedside, patient has been more confused than usual.  Patient is complaining of abdominal pain, nausea, and joint pain.  His symptoms have progressively worsened over the last week.  He denies any exacerbating or alleviating factors.  Pain is rated as moderate in nature.  He denies any chest pain, shortness of breath, headaches, fevers focal neurologic deficits, numbness, or tingling. Admitted to [facility] and [place] 4 days ago.

## 2023-01-12 NOTE — ED ADULT NURSE REASSESSMENT NOTE - NS ED NURSE REASSESS COMMENT FT1
pt received A&Ox4 offering no complaints at this time. medicated as per MD orders. respirations even and unlabored. denies c/p, SOB, HA, N/V/D, pain. side rails up, safety maintained. awaiting bed assignment. 20G to RAC, patent

## 2023-01-12 NOTE — ED ADULT TRIAGE NOTE - CHIEF COMPLAINT QUOTE
Pt presents to ED ambulatory from home with c/o elevated calcium level of 15.9. Pt had outpatient lab work done and was advised by MD to come to ED for admission. Pt reports feeling generalized weakness. Pt family reports pt was treated for this approx 1 week ago with no relief. Pt's family reports Dr. Lundy advised pt to come to ED for admission and surgery to have parathyroid removed.

## 2023-01-12 NOTE — ED ADULT NURSE NOTE - OBJECTIVE STATEMENT
Patient presents to ED for evaluation of abnormal labs. Referred by PMD for elevated calcium level related to parathyroid. Stated feeling weakness and was at [facility] for 4 days for treatment. Reports nausea with vomiting this AM. As per patient, MD recommended removal of parathyroid. He is AA&Ox4, in no acute distress. Respirations even, unlabored on room air. Denies CP, dizziness, SOB, dyspnea. 20 g IV placed right AC. Labs drawn and sent. Pending X-ray and lab results.

## 2023-01-13 DIAGNOSIS — E04.2 NONTOXIC MULTINODULAR GOITER: ICD-10-CM

## 2023-01-13 DIAGNOSIS — I10 ESSENTIAL (PRIMARY) HYPERTENSION: ICD-10-CM

## 2023-01-13 DIAGNOSIS — N18.9 CHRONIC KIDNEY DISEASE, UNSPECIFIED: ICD-10-CM

## 2023-01-13 DIAGNOSIS — Z29.9 ENCOUNTER FOR PROPHYLACTIC MEASURES, UNSPECIFIED: ICD-10-CM

## 2023-01-13 DIAGNOSIS — F42.9 OBSESSIVE-COMPULSIVE DISORDER, UNSPECIFIED: ICD-10-CM

## 2023-01-13 DIAGNOSIS — E21.3 HYPERPARATHYROIDISM, UNSPECIFIED: ICD-10-CM

## 2023-01-13 DIAGNOSIS — E83.52 HYPERCALCEMIA: ICD-10-CM

## 2023-01-13 DIAGNOSIS — E05.90 THYROTOXICOSIS, UNSPECIFIED WITHOUT THYROTOXIC CRISIS OR STORM: ICD-10-CM

## 2023-01-13 DIAGNOSIS — Z86.39 PERSONAL HISTORY OF OTHER ENDOCRINE, NUTRITIONAL AND METABOLIC DISEASE: ICD-10-CM

## 2023-01-13 LAB
24R-OH-CALCIDIOL SERPL-MCNC: 62.7 NG/ML — SIGNIFICANT CHANGE UP (ref 30–80)
ALBUMIN SERPL ELPH-MCNC: 3.4 G/DL — SIGNIFICANT CHANGE UP (ref 3.3–5)
ALP SERPL-CCNC: 57 U/L — SIGNIFICANT CHANGE UP (ref 40–120)
ALT FLD-CCNC: 18 U/L — SIGNIFICANT CHANGE UP (ref 4–41)
ANION GAP SERPL CALC-SCNC: 8 MMOL/L — SIGNIFICANT CHANGE UP (ref 7–14)
AST SERPL-CCNC: 17 U/L — SIGNIFICANT CHANGE UP (ref 4–40)
BASOPHILS # BLD AUTO: 0.03 K/UL — SIGNIFICANT CHANGE UP (ref 0–0.2)
BASOPHILS NFR BLD AUTO: 0.4 % — SIGNIFICANT CHANGE UP (ref 0–2)
BILIRUB DIRECT SERPL-MCNC: <0.2 MG/DL — SIGNIFICANT CHANGE UP (ref 0–0.3)
BILIRUB INDIRECT FLD-MCNC: >0.2 MG/DL — SIGNIFICANT CHANGE UP (ref 0–1)
BILIRUB SERPL-MCNC: 0.4 MG/DL — SIGNIFICANT CHANGE UP (ref 0.2–1.2)
BUN SERPL-MCNC: 32 MG/DL — HIGH (ref 7–23)
CALCIUM SERPL-MCNC: 11.8 MG/DL — HIGH (ref 8.4–10.5)
CHLORIDE SERPL-SCNC: 107 MMOL/L — SIGNIFICANT CHANGE UP (ref 98–107)
CHOLEST SERPL-MCNC: 122 MG/DL — SIGNIFICANT CHANGE UP
CO2 SERPL-SCNC: 25 MMOL/L — SIGNIFICANT CHANGE UP (ref 22–31)
CREAT SERPL-MCNC: 2.22 MG/DL — HIGH (ref 0.5–1.3)
CULTURE RESULTS: SIGNIFICANT CHANGE UP
EGFR: 30 ML/MIN/1.73M2 — LOW
EOSINOPHIL # BLD AUTO: 0.12 K/UL — SIGNIFICANT CHANGE UP (ref 0–0.5)
EOSINOPHIL NFR BLD AUTO: 1.5 % — SIGNIFICANT CHANGE UP (ref 0–6)
GLUCOSE SERPL-MCNC: 94 MG/DL — SIGNIFICANT CHANGE UP (ref 70–99)
HCT VFR BLD CALC: 30.2 % — LOW (ref 39–50)
HCV AB S/CO SERPL IA: 0.14 S/CO — SIGNIFICANT CHANGE UP (ref 0–0.99)
HCV AB SERPL-IMP: SIGNIFICANT CHANGE UP
HDLC SERPL-MCNC: 59 MG/DL — SIGNIFICANT CHANGE UP
HGB BLD-MCNC: 9.6 G/DL — LOW (ref 13–17)
IANC: 5.85 K/UL — SIGNIFICANT CHANGE UP (ref 1.8–7.4)
IMM GRANULOCYTES NFR BLD AUTO: 0.4 % — SIGNIFICANT CHANGE UP (ref 0–0.9)
INR BLD: 1.16 RATIO — SIGNIFICANT CHANGE UP (ref 0.88–1.16)
LIPID PNL WITH DIRECT LDL SERPL: 50 MG/DL — SIGNIFICANT CHANGE UP
LYMPHOCYTES # BLD AUTO: 1.14 K/UL — SIGNIFICANT CHANGE UP (ref 1–3.3)
LYMPHOCYTES # BLD AUTO: 14.6 % — SIGNIFICANT CHANGE UP (ref 13–44)
MAGNESIUM SERPL-MCNC: 1.9 MG/DL — SIGNIFICANT CHANGE UP (ref 1.6–2.6)
MCHC RBC-ENTMCNC: 27 PG — SIGNIFICANT CHANGE UP (ref 27–34)
MCHC RBC-ENTMCNC: 31.8 GM/DL — LOW (ref 32–36)
MCV RBC AUTO: 85.1 FL — SIGNIFICANT CHANGE UP (ref 80–100)
MONOCYTES # BLD AUTO: 0.66 K/UL — SIGNIFICANT CHANGE UP (ref 0–0.9)
MONOCYTES NFR BLD AUTO: 8.4 % — SIGNIFICANT CHANGE UP (ref 2–14)
NEUTROPHILS # BLD AUTO: 5.85 K/UL — SIGNIFICANT CHANGE UP (ref 1.8–7.4)
NEUTROPHILS NFR BLD AUTO: 74.7 % — SIGNIFICANT CHANGE UP (ref 43–77)
NON HDL CHOLESTEROL: 63 MG/DL — SIGNIFICANT CHANGE UP
NRBC # BLD: 0 /100 WBCS — SIGNIFICANT CHANGE UP (ref 0–0)
NRBC # FLD: 0 K/UL — SIGNIFICANT CHANGE UP (ref 0–0)
PLATELET # BLD AUTO: 212 K/UL — SIGNIFICANT CHANGE UP (ref 150–400)
POTASSIUM SERPL-MCNC: 3.1 MMOL/L — LOW (ref 3.5–5.3)
POTASSIUM SERPL-SCNC: 3.1 MMOL/L — LOW (ref 3.5–5.3)
PROT SERPL-MCNC: 5.7 G/DL — LOW (ref 6–8.3)
PROTHROM AB SERPL-ACNC: 13.5 SEC — HIGH (ref 10.5–13.4)
RBC # BLD: 3.55 M/UL — LOW (ref 4.2–5.8)
RBC # FLD: 14.9 % — HIGH (ref 10.3–14.5)
SODIUM SERPL-SCNC: 140 MMOL/L — SIGNIFICANT CHANGE UP (ref 135–145)
SPECIMEN SOURCE: SIGNIFICANT CHANGE UP
T4 FREE SERPL-MCNC: 1.4 NG/DL — SIGNIFICANT CHANGE UP (ref 0.9–1.8)
THYROGLOB AB SERPL-ACNC: <20 IU/ML
THYROPEROXIDASE AB SERPL IA-ACNC: 18.6 IU/ML
TRIGL SERPL-MCNC: 67 MG/DL — SIGNIFICANT CHANGE UP
VIT D25+D1,25 OH+D1,25 PNL SERPL-MCNC: 39.2 PG/ML — SIGNIFICANT CHANGE UP (ref 19.9–79.3)
WBC # BLD: 7.83 K/UL — SIGNIFICANT CHANGE UP (ref 3.8–10.5)
WBC # FLD AUTO: 7.83 K/UL — SIGNIFICANT CHANGE UP (ref 3.8–10.5)

## 2023-01-13 PROCEDURE — 99255 IP/OBS CONSLTJ NEW/EST HI 80: CPT | Mod: GC

## 2023-01-13 PROCEDURE — 99223 1ST HOSP IP/OBS HIGH 75: CPT

## 2023-01-13 RX ORDER — SODIUM CHLORIDE 9 MG/ML
1000 INJECTION INTRAMUSCULAR; INTRAVENOUS; SUBCUTANEOUS
Refills: 0 | Status: DISCONTINUED | OUTPATIENT
Start: 2023-01-13 | End: 2023-01-13

## 2023-01-13 RX ORDER — HEPARIN SODIUM 5000 [USP'U]/ML
5000 INJECTION INTRAVENOUS; SUBCUTANEOUS EVERY 12 HOURS
Refills: 0 | Status: DISCONTINUED | OUTPATIENT
Start: 2023-01-13 | End: 2023-01-18

## 2023-01-13 RX ORDER — DOXAZOSIN MESYLATE 4 MG
4 TABLET ORAL AT BEDTIME
Refills: 0 | Status: DISCONTINUED | OUTPATIENT
Start: 2023-01-13 | End: 2023-01-18

## 2023-01-13 RX ORDER — POTASSIUM CHLORIDE 20 MEQ
40 PACKET (EA) ORAL ONCE
Refills: 0 | Status: COMPLETED | OUTPATIENT
Start: 2023-01-13 | End: 2023-01-13

## 2023-01-13 RX ORDER — ACETAMINOPHEN 500 MG
650 TABLET ORAL EVERY 6 HOURS
Refills: 0 | Status: DISCONTINUED | OUTPATIENT
Start: 2023-01-13 | End: 2023-01-18

## 2023-01-13 RX ORDER — SUCRALFATE 1 G
1 TABLET ORAL
Refills: 0 | Status: DISCONTINUED | OUTPATIENT
Start: 2023-01-13 | End: 2023-01-18

## 2023-01-13 RX ORDER — PANTOPRAZOLE SODIUM 20 MG/1
40 TABLET, DELAYED RELEASE ORAL
Refills: 0 | Status: DISCONTINUED | OUTPATIENT
Start: 2023-01-13 | End: 2023-01-18

## 2023-01-13 RX ORDER — POTASSIUM CHLORIDE 20 MEQ
40 PACKET (EA) ORAL ONCE
Refills: 0 | Status: DISCONTINUED | OUTPATIENT
Start: 2023-01-13 | End: 2023-01-13

## 2023-01-13 RX ORDER — CINACALCET 30 MG/1
30 TABLET, FILM COATED ORAL
Refills: 0 | Status: DISCONTINUED | OUTPATIENT
Start: 2023-01-13 | End: 2023-01-18

## 2023-01-13 RX ORDER — MAGNESIUM SULFATE 500 MG/ML
2 VIAL (ML) INJECTION ONCE
Refills: 0 | Status: COMPLETED | OUTPATIENT
Start: 2023-01-13 | End: 2023-01-13

## 2023-01-13 RX ORDER — ATORVASTATIN CALCIUM 80 MG/1
10 TABLET, FILM COATED ORAL AT BEDTIME
Refills: 0 | Status: DISCONTINUED | OUTPATIENT
Start: 2023-01-13 | End: 2023-01-18

## 2023-01-13 RX ORDER — DOXAZOSIN MESYLATE 4 MG
4 TABLET ORAL AT BEDTIME
Refills: 0 | Status: DISCONTINUED | OUTPATIENT
Start: 2023-01-13 | End: 2023-01-13

## 2023-01-13 RX ORDER — AMLODIPINE BESYLATE 2.5 MG/1
10 TABLET ORAL DAILY
Refills: 0 | Status: DISCONTINUED | OUTPATIENT
Start: 2023-01-13 | End: 2023-01-18

## 2023-01-13 RX ORDER — SODIUM CHLORIDE 9 MG/ML
1000 INJECTION INTRAMUSCULAR; INTRAVENOUS; SUBCUTANEOUS
Refills: 0 | Status: DISCONTINUED | OUTPATIENT
Start: 2023-01-13 | End: 2023-01-14

## 2023-01-13 RX ADMIN — SODIUM CHLORIDE 100 MILLILITER(S): 9 INJECTION INTRAMUSCULAR; INTRAVENOUS; SUBCUTANEOUS at 16:49

## 2023-01-13 RX ADMIN — Medication 1 GRAM(S): at 09:57

## 2023-01-13 RX ADMIN — Medication 40 MILLIEQUIVALENT(S): at 09:56

## 2023-01-13 RX ADMIN — SODIUM CHLORIDE 100 MILLILITER(S): 9 INJECTION INTRAMUSCULAR; INTRAVENOUS; SUBCUTANEOUS at 01:17

## 2023-01-13 RX ADMIN — Medication 4 MILLIGRAM(S): at 22:22

## 2023-01-13 RX ADMIN — PANTOPRAZOLE SODIUM 40 MILLIGRAM(S): 20 TABLET, DELAYED RELEASE ORAL at 06:00

## 2023-01-13 RX ADMIN — Medication 25 GRAM(S): at 01:16

## 2023-01-13 RX ADMIN — CINACALCET 30 MILLIGRAM(S): 30 TABLET, FILM COATED ORAL at 22:23

## 2023-01-13 RX ADMIN — AMLODIPINE BESYLATE 10 MILLIGRAM(S): 2.5 TABLET ORAL at 05:46

## 2023-01-13 RX ADMIN — CINACALCET 30 MILLIGRAM(S): 30 TABLET, FILM COATED ORAL at 09:57

## 2023-01-13 RX ADMIN — ATORVASTATIN CALCIUM 10 MILLIGRAM(S): 80 TABLET, FILM COATED ORAL at 22:19

## 2023-01-13 RX ADMIN — HEPARIN SODIUM 5000 UNIT(S): 5000 INJECTION INTRAVENOUS; SUBCUTANEOUS at 05:58

## 2023-01-13 RX ADMIN — Medication 50 MILLIGRAM(S): at 06:00

## 2023-01-13 NOTE — PROGRESS NOTE ADULT - PROBLEM SELECTOR PLAN 1
On arrival w/ albumin corrected ca of 14.1, PTH of 476. Recent [facility] admission (discharged 6 days ago), s/p calcitonin and fluids there.   -parathyroid US there showed a 2cm nodule along posterior aspect of lower pole of left lobe that may be parathyroid in nature  -pt was to get ?sestamibi scan but left because he had appt with Dr. Lundy (did not get scan, as Dr. Lundy sent him to ER)  -Surgery consulted, will f/u recs regarding which imaging modality and if plan for surgery  -will continue IVF at 100cc/hr for 10 hrs. Pt s/p pamidronate and calcitonin x1, trend bmp  -Will consult endocrine and nephro in AM.   -Family wants to be called if another calcitonin dose is planned to be given as they are worried that pt had received mult dosages already recently. On arrival w/ albumin corrected ca of 14.1, PTH of 476. Recent [facility] admission (discharged 6 days ago), s/p calcitonin and fluids there. Parathyroid US then showed 2cm nodule that may be parathyroid in nature. Had originally been planned for MRI/sestamibi(?) scan w/ Dr. Lundy though was sent to ER instead.  - surgery consulted, no plan for surgical intervention  - s/p calcitonin x1 and pamidronate  - Ca 11.8 this AM, appears to be improving  - endocrinology consulted, appreciate recs:     - continue cinecalcet 30mg BID     - continue mIVF (will continue NS at 100cc/hr)     - monitor tele, EKG and BMP (Ca/albumin) daily     - f/u vitamin D labs     - check MRI neck soft tissue w/ and w/o contrast  - family requesting call if more calcitonin to be given (worried that multiple dosages have already been given)

## 2023-01-13 NOTE — CONSULT NOTE ADULT - ASSESSMENT
#Hypercalcemia 2/2 Primary Hyperparathyroidism   Ca on presentation 14.2 , currently improved to 11.8, corrected 12.28  now s/p Calcitonin x 1 dose & Pamidronate 60mg here 1/12-1/13  was admitted to [facility] last week, Ca on presentation there 15.9 with PTH as high as 585, s/p Calcitonin x 4 doses & IVF with improvement. Discharged with plans for sestamibi scan as US thyroid/parathyroid performed during that admission revealed 2cm lesion along posterior border of L. lower pole thyroid concerning for parathyroid adenoma.   egfr 30   PTH here in 400s  Recommendations:   - ivf & encourage po hydration as tolerated   - daily ekg, tele monitoring   - trend Ca/albumin at least daily   - f/u vitamin D 25 oh & 1, 25 dihydroxy levels   - consider checking spep/upep     #Hyperthyroidism   TSH 0.40, FT4 1.4  currently on methimazole 2.5mg daily    #Thyroid Nodules   multiple thyroid nodules noted on recent US thyroid/parathyroid performed at [facility] (results in HIE)   - outpatient follow up/FNA    Francisca Kapoor DO   Endocrine Fellow  For follow up questions, discharge recommendations, or new consults please call answering service at [phone] (weekdays), [phone] (nights/weekends). For nonurgent matters, please email [email or web address] or [email or web address]    78 y/o male w/ HTN, HLD, Prostate ca (no tx), OCD, hyperthyroidism, CKD and recent hospitalization at [place] last week for hypercalcemia presents back to the ER from Dr. Lundy's office for severe, symptomatic hypercalcemia. Endocrine consulted for assistance with evaluation/management of hypercalcemia with elevated PTH.     #Severe Symptomatic Hypercalcemia, now improving likely 2/2 Primary Hyperparathyroidism   Ca on presentation 14.2 , currently improved to 11.8, corrected 12.28  now s/p Calcitonin x 1 dose & Pamidronate 60mg here 1/12-1/13  was admitted to [facility] last week, Ca on presentation there 15.9 with PTH as high as 585, s/p Calcitonin x 4 doses & IVF with improvement. Discharged with plans for sestamibi scan as US thyroid/parathyroid performed during that admission revealed 2cm lesion along posterior border of L. lower pole thyroid concerning for parathyroid adenoma.   egfr 30   PTH here in 400s  Vitamin D, 25-Hydroxy: 62.7 ng/mL (01.13.23 @ 05:40)  Recommendations:   - ivf & encourage po hydration as tolerated   - daily ekg, tele monitoring   - trend Ca/albumin at least daily   - f/u vitamin D 1, 25 dihydroxy levels   - consider checking spep/upep     #Hyperthyroidism   TSH 0.40, FT4 1.4  - currently on methimazole 2.5mg daily  - outpatient follow up with his private endocrinologist     #Thyroid Nodules   multiple thyroid nodules noted on recent US thyroid/parathyroid performed at [facility] (results in HIE)   - outpatient follow up with his private endocrinologist     Francisca Kapoor DO   Endocrine Fellow  For follow up questions, discharge recommendations, or new consults please call answering service at [phone] (weekdays), [phone] (nights/weekends). For nonurgent matters, please email [email or web address] or [email or web address]    76 y/o male w/ HTN, HLD, Prostate ca (no tx), OCD, hyperthyroidism, CKD and recent hospitalization at [place] last week for hypercalcemia presents back to the ER from Dr. Lundy's office for severe, symptomatic hypercalcemia. Endocrine consulted for assistance with evaluation/management of hypercalcemia with elevated PTH.     #Severe Symptomatic Hypercalcemia, now improving likely 2/2 Primary Hyperparathyroidism vs Malignancy   Ca on presentation 14.2 , currently improved to 11.8, corrected 12.28, s/p Calcitonin x 1 dose & Pamidronate 60mg here 1/12-1/13  PTH here in 400s  Vitamin D, 25-Hydroxy: 62.7 ng/mL (01.13.23 @ 05:40)  egfr 30 - new since onset of hypercalcemia ~ 10/2022 per pt   was admitted to [facility] last week, Ca on presentation there 15.9 with PTH as high as 585, s/p Calcitonin x 4 doses & IVF with improvement. Discharged with plans for sestamibi scan as US thyroid/parathyroid performed during that admission revealed 2cm lesion along posterior border of L. lower pole thyroid concerning for parathyroid adenoma.   Recommendations:   - ivf & encourage po hydration as tolerated   - daily ekg, tele monitoring   - trend Ca/albumin at least daily   - f/u vitamin D 1, 25 dihydroxy levels   - consider checking spep/upep   - Endocrine surgery on board, no acute surgical interventions at this time     #Hyperthyroidism   TSH 0.40, FT4 1.4  - currently on methimazole 2.5mg daily  - outpatient follow up with his private endocrinologist     #Thyroid Nodules   multiple thyroid nodules noted on recent US thyroid/parathyroid performed at [facility] (results in HIE)   - outpatient follow up with his private endocrinologist     Francisca Kapoor DO   Endocrine Fellow  For follow up questions, discharge recommendations, or new consults please call answering service at [phone] (weekdays), [phone] (nights/weekends). For nonurgent matters, please email [email or web address] or [email or web address]    76 y/o male w/ HTN, HLD, Prostate ca (no tx), OCD, hyperthyroidism, CKD and recent hospitalization at [place] last week for hypercalcemia presents back to the ER from Dr. Lundy's office for severe, symptomatic hypercalcemia. Endocrine consulted for assistance with evaluation/management of hypercalcemia with elevated PTH.     #Severe Symptomatic Hypercalcemia, now improving likely 2/2 Primary Hyperparathyroidism  vs Malignancy? unlikely given PTH is very high & 2cm ?parathyroid lesion noted on US thyroid/parathyroid  Ca on presentation 14.2 , currently improved to 11.8, corrected 12.28, s/p Calcitonin x 1 dose & Pamidronate 60mg here 1/12-1/13  PTH here in 400s  Vitamin D, 25-Hydroxy: 62.7 ng/mL (01.13.23 @ 05:40)  egfr 30 - new, also since onset of hypercalcemia ~ 10/2022 per pt   was admitted to [facility] last week, Ca on presentation there 15.9 with PTH as high as 585, s/p Calcitonin x 4 doses & IVF with improvement. Discharged with plans for sestamibi scan as US thyroid/parathyroid performed during that admission revealed 2x1.2x0.8cm lesion along posterior border of L. lower pole thyroid concerning for parathyroid adenoma.   hx of prostate CA, however recent CT A/P without contrast done at [facility] does not make note of any nusrat abnormalities other than degenerative changes & osteopenia (results seen in HIE)   Recommendations:   - ivf & encourage po hydration as tolerated   - daily ekg, tele monitoring   - trend Ca/albumin at least daily   - f/u vitamin D 1, 25 dihydroxy levels   - consider checking spep/upep/immunofixation given anemia & new renal dysfunction, although less likely given elevated PTH  - Endocrine surgery on board, no acute surgical interventions at this time - will reach out to Dr. Sim regarding plans for parathyroid surgery - on this admission?   - pamidronate's calcium lowering effects can be expected typically 3 days after administration and usually last for approximately 2 week     #Hyperthyroidism   TSH 0.40, FT4 1.4  - continue home methimazole 2.5mg daily - LFTs & ANC wnl   - outpatient follow up with his private endocrinologist     #Thyroid Nodules   multiple thyroid nodules noted on recent US thyroid/parathyroid performed at [facility] (results in HIE)   reports being monitored/previously bx benign many years ago   US thyroid/parathyroid at [facility] revealed R. 1cm interpolar nodule, 0.6cm cyst, 1.1cm heterogenous nodule and L. 3.8 heterogenous lower pole nodule, 2.1cm heterogenous upper pole nodule along with 2 x 1.2 x 0.8 cm hypoechoic nodule along posterior boarder of L. lower pole thyroid   - outpatient follow up with his private endocrinologist vs inpatient FNA/hemithyroidectomy? Will reach out to Dr. Lundy, Endocrine surgeon for input.     Discussed with Dr. Dede Kapoor, DO   Endocrine Fellow  For follow up questions, discharge recommendations, or new consults please call answering service at [phone] (weekdays), [phone] (nights/weekends). For nonurgent matters, please email [email or web address] or [email or web address]    78 y/o male w/ HTN, HLD, Prostate ca (no tx), OCD, hyperthyroidism, CKD and recent hospitalization at [place] last week for hypercalcemia presents back to the ER from Dr. Lundy's office for severe, symptomatic hypercalcemia. Endocrine consulted for assistance with evaluation/management of hypercalcemia with elevated PTH.     #Severe Symptomatic Hypercalcemia, now improving likely 2/2 Primary Hyperparathyroidism  vs Malignancy? unlikely given PTH is very high & 2cm ?parathyroid lesion noted on US thyroid/parathyroid  Ca on presentation 14.2 , currently improved to 11.8, corrected 12.28, s/p Calcitonin x 1 dose & Pamidronate 60mg here 1/12-1/13  PTH here in 400s  Vitamin D, 25-Hydroxy: 62.7 ng/mL (01.13.23 @ 05:40)  egfr 30 - new, also since onset of hypercalcemia ~ 10/2022 per pt   was admitted to [facility] last week, Ca on presentation there 15.9 with PTH as high as 585, s/p Calcitonin x 4 doses & IVF with improvement. Discharged with plans for sestamibi scan as US thyroid/parathyroid performed during that admission revealed 2x1.2x0.8cm lesion along posterior border of L. lower pole thyroid concerning for parathyroid adenoma.   hx of prostate CA, however recent CT A/P without contrast done at [facility] does not make note of any nusrat abnormalities other than degenerative changes & osteopenia (results seen in HIE)   Recommendations:   - ivf & encourage po hydration as tolerated   - continue with cinacalcet 30mg bid   - daily ekg, tele monitoring   - trend Ca/albumin at least daily   - f/u vitamin D 1, 25 dihydroxy levels   - consider checking spep/upep/immunofixation given anemia & new renal dysfunction, although less likely given elevated PTH  - Endocrine surgery on board, no acute surgical interventions at this time - will reach out to Dr. Sim regarding plans for parathyroid surgery - on this admission?   - pamidronate's calcium lowering effects can be expected typically 3 days after administration and usually last for approximately 2 week     #Hyperthyroidism   TSH 0.40, FT4 1.4  - continue home methimazole 2.5mg daily - LFTs & ANC wnl   - outpatient follow up with his private endocrinologist     #Thyroid Nodules   multiple thyroid nodules noted on recent US thyroid/parathyroid performed at [facility] (results in HIE)   reports being monitored/previously bx benign many years ago   US thyroid/parathyroid at [facility] revealed R. 1cm interpolar nodule, 0.6cm cyst, 1.1cm heterogenous nodule and L. 3.8 heterogenous lower pole nodule, 2.1cm heterogenous upper pole nodule along with 2 x 1.2 x 0.8 cm hypoechoic nodule along posterior boarder of L. lower pole thyroid   - outpatient follow up with his private endocrinologist vs inpatient FNA/hemithyroidectomy? Will reach out to Dr. Lundy, Endocrine surgeon for input.     Discussed with Dr. Dede Kapoor, DO   Endocrine Fellow  For follow up questions, discharge recommendations, or new consults please call answering service at [phone] (weekdays), [phone] (nights/weekends). For nonurgent matters, please email [email or web address] or [email or web address]    78 y/o male w/ HTN, HLD, Prostate ca (no tx), OCD, hyperthyroidism, CKD and recent hospitalization at [place] last week for hypercalcemia presents back to the ER from Dr. Lundy's office for severe, symptomatic hypercalcemia. Endocrine consulted for assistance with evaluation/management of hypercalcemia with elevated PTH.     #Severe Symptomatic Hypercalcemia, now improving likely 2/2 Primary Hyperparathyroidism  vs Malignancy? unlikely given PTH is very high & 2cm ?parathyroid lesion noted on US thyroid/parathyroid  Ca on presentation 14.2 , currently improved to 11.8, corrected 12.28, s/p Calcitonin x 1 dose & Pamidronate 60mg here 1/12-1/13  PTH here in 400s  Vitamin D, 25-Hydroxy: 62.7 ng/mL (01.13.23 @ 05:40)  egfr 30 - new, also since onset of hypercalcemia ~ 10/2022 per pt   was admitted to [facility] last week, Ca on presentation there 15.9 with PTH as high as 585, s/p Calcitonin x 4 doses & IVF with improvement. Discharged with plans for sestamibi scan as US thyroid/parathyroid performed during that admission revealed 2x1.2x0.8cm lesion along posterior border of L. lower pole thyroid concerning for parathyroid adenoma.   hx of prostate CA, however recent CT A/P without contrast done at [facility] does not make note of any nusrat abnormalities other than degenerative changes & osteopenia (results seen in HIE)   Recommendations:   - ivf & encourage po hydration as tolerated   - continue with cinacalcet 30mg bid   - daily ekg, tele monitoring   - trend Ca/albumin at least daily   - f/u vitamin D 1, 25 dihydroxy levels   - consider checking spep/upep/immunofixation given anemia & new renal dysfunction, although less likely given elevated PTH  - Endocrine surgery on board, no acute surgical interventions at this time - will reach out to Dr. Sim regarding plans for parathyroid surgery - on this admission?   - please obtain MRI neck soft tissue with and without contrast (had been ordered by Dr. Lundy outpatient prior to this admission) while inpatient   - pamidronate's calcium lowering effects can be expected typically 3 days after administration and usually last for approximately 2 week     #Hyperthyroidism   TSH 0.40, FT4 1.4  - continue home methimazole 2.5mg daily - LFTs & ANC wnl   - outpatient follow up with his private endocrinologist     #Thyroid Nodules   multiple thyroid nodules noted on recent US thyroid/parathyroid performed at [facility] (results in HIE)   reports being monitored/previously bx benign many years ago   US thyroid/parathyroid at [facility] revealed R. 1cm interpolar nodule, 0.6cm cyst, 1.1cm heterogenous nodule and L. 3.8 heterogenous lower pole nodule, 2.1cm heterogenous upper pole nodule along with 2 x 1.2 x 0.8 cm hypoechoic nodule along posterior boarder of L. lower pole thyroid   - outpatient follow up with his private endocrinologist vs inpatient FNA/hemithyroidectomy? Will reach out to Dr. Lundy, Endocrine surgeon for input.     recs discussed with primary team.   Discussed with Dr. Dede Kapoor, DO   Endocrine Fellow  For follow up questions, discharge recommendations, or new consults please call answering service at [phone] (weekdays), [phone] (nights/weekends). For nonurgent matters, please email [email or web address] or [email or web address]    76 y/o male w/ HTN, HLD, Prostate ca (no tx), OCD, hyperthyroidism, CKD and recent hospitalization at [place] last week for hypercalcemia presents back to the ER from Dr. Lundy's office for severe, symptomatic hypercalcemia. Endocrine consulted for assistance with evaluation/management of hypercalcemia with elevated PTH.     #Severe Symptomatic Hypercalcemia, now improving likely 2/2 Primary Hyperparathyroidism  vs Malignancy? (rule out parathyroid carcinoma) unlikely given PTH is very high & 2cm ?parathyroid lesion noted on US thyroid/parathyroid  Ca on presentation 14.2 , currently improved to 11.8, corrected 12.28, s/p Calcitonin x 1 dose & Pamidronate 60mg here 1/12-1/13  PTH here in 400s  Vitamin D, 25-Hydroxy: 62.7 ng/mL (01.13.23 @ 05:40)  egfr 30 - new, also since onset of hypercalcemia ~ 10/2022 per pt   was admitted to [facility] last week, Ca on presentation there 15.9 with PTH as high as 585, s/p Calcitonin x 4 doses & IVF with improvement. Discharged with plans for sestamibi scan as US thyroid/parathyroid performed during that admission revealed 2x1.2x0.8cm lesion along posterior border of L. lower pole thyroid concerning for parathyroid adenoma.   hx of prostate CA, however recent CT A/P without contrast done at [facility] does not make note of any nusrat abnormalities other than degenerative changes & osteopenia (results seen in HIE)   Recommendations:   - ivf & encourage po hydration as tolerated   - continue with cinacalcet 30mg bid   - daily ekg, tele monitoring   - trend Ca/albumin at least daily   - f/u vitamin D 1, 25 dihydroxy levels   - consider checking spep/upep/immunofixation given anemia & new renal dysfunction, although less likely given elevated PTH  - Endocrine surgery on board, no acute surgical interventions at this time - will reach out to Dr. Sim regarding plans for parathyroid surgery - on this admission?   - please obtain MRI neck soft tissue with and without contrast (had been ordered by Dr. Lundy outpatient prior to this admission) while inpatient   - pamidronate's calcium lowering effects can be expected typically 3 days after administration and usually last for approximately 2 week     #Hyperthyroidism   TSH 0.40, FT4 1.4  - continue home methimazole 2.5mg daily - LFTs & ANC wnl   - outpatient follow up with his private endocrinologist     #Thyroid Nodules   multiple thyroid nodules noted on recent US thyroid/parathyroid performed at [facility] (results in HIE)   reports being monitored/previously bx benign many years ago   US thyroid/parathyroid at [facility] revealed R. 1cm interpolar nodule, 0.6cm cyst, 1.1cm heterogenous nodule and L. 3.8 heterogenous lower pole nodule, 2.1cm heterogenous upper pole nodule along with 2 x 1.2 x 0.8 cm hypoechoic nodule along posterior boarder of L. lower pole thyroid   - outpatient follow up with his private endocrinologist vs inpatient FNA/hemithyroidectomy? Will reach out to Dr. Lundy, Endocrine surgeon for input.     recs discussed with primary team.   Discussed with Dr. Dede Kapoor, DO   Endocrine Fellow  For follow up questions, discharge recommendations, or new consults please call answering service at [phone] (weekdays), [phone] (nights/weekends). For nonurgent matters, please email [email or web address] or [email or web address]

## 2023-01-13 NOTE — ED ADULT NURSE REASSESSMENT NOTE - NS ED NURSE REASSESS COMMENT FT1
Break RN: Pt AAOx4 resting in bed in non acute distress. Respirations even and unlabored. PO fluids given. IVF infusing per MD orders. Per pt's son, family is concerned about receiving more than 4 doses of Calcitonin injection per dosing recommendation while hospitalized @[facility]. Per [facility] policy is Q12hrs for max 48hrs. Pt given 4 Calcitonin injections 1/1/23-1/4/23. 5th dose of calcitonin injection given while in ED @2034 per EMAR. MD Nguyễn made aware. Calcium level to be checked with 6am labs prior to administering 6am calcitonin injection. Pt and family member educated, verbalizes understanding and plan of care. Tele admit awaiting bed. Pt awaiting ESSU3 placement

## 2023-01-13 NOTE — PROGRESS NOTE ADULT - PROBLEM SELECTOR PLAN 3
Pt w/ new CKD, cr around 2.0 from last admission, replete lytes. Pt w/ new CKD on last admission. Cr around 2.0 from last admission, replete lytes.

## 2023-01-13 NOTE — PROGRESS NOTE ADULT - PROBLEM SELECTOR PLAN 6
heparin for dvt ppx, dash diet DVT ppx: subQ heparin  Diet: DASH/TLC  Dispo: admit to floor    Code Status: FULL CODE

## 2023-01-13 NOTE — PROGRESS NOTE ADULT - ASSESSMENT
77M presenting with symptomatic hypercalcemia.    Recommendations:    - Continue with IV hydration and biphosphonates to lower his calcium levels.  - Endocrine consultation for hypercalcemia work-up  - Endocrine surgery will continue to follow.  - No acute surgical interventions at this time.    C Team Surgery  [id number]

## 2023-01-13 NOTE — H&P ADULT - HISTORY OF PRESENT ILLNESS
***Pt has separate MRN at [facility] recently discharged 6 days ago.    76 y/o male w/ HTN, HLD, Prostate ca (no tx), OCD, hyperthyroidism, CKD and recent hospitalization at [place] last week for hypercalcemia presents back to the ER from Dr. Lundy's office for hypercalcemia.    He had initially gone to [facility] on 1/5 for high outpatient Ca labs in addition to no appetite, constipation, intermittent confusion and weakness in his legs. He was found with ca to 16 and given fluids and calcitonin up to four times. His PTH was close to 500, and he was found with new CKD. His calcium ultimately improved to 12.3 and he had a parathyroid US which showed a 2cm nodule along posterior aspect of lower pole of L lobe and had a sestemibi scan ordered but this was discontinued as he had an appt with Dr. Lundy today, an endocrine surgeon.     Since discharge he continued to be very weak and with no appetite, still constipated with some intermittent abd pain. He was also noted by family to be intermittently confused as well. Today at his visit with Dr. Lundy he was found again hypercalcemic and sent to the ER. He denies fever, chills, cough, chest pain, shortness of breath, diarrhea or dysuria. He was given fluids, calcitonin and pamidronate in the ER.

## 2023-01-13 NOTE — H&P ADULT - NSHPLABSRESULTS_GEN_ALL_CORE
10.9   8.61  )-----------( 239      ( 2023 18:00 )             34.0       01-12    141  |  103  |  35<H>  ----------------------------<  125<H>  3.3<L>   |  27  |  2.24<H>    Ca    14.2<HH>      2023 18:00  Phos  2.6       Mg     1.30         TPro  6.6  /  Alb  4.1  /  TBili  0.4  /  DBili  x   /  AST  18  /  ALT  19  /  AlkPhos  64                      Urinalysis Basic - ( 2023 19:41 )    Color: Light Yellow / Appearance: Clear / S.009 / pH: x  Gluc: x / Ketone: Negative  / Bili: Negative / Urobili: <2 mg/dL   Blood: x / Protein: Negative / Nitrite: Negative   Leuk Esterase: Negative / RBC: x / WBC x   Sq Epi: x / Non Sq Epi: x / Bacteria: x    CXR: clear lungs

## 2023-01-13 NOTE — H&P ADULT - PROBLEM SELECTOR PLAN 1
On arrival w/ albumin corrected ca of 14.1, PTH of 476. Recent [facility] admission (discharged 6 days ago), s/p calcitonin and fluids there.   -parathyroid US there showed a 2cm nodule along posterior aspect of lower pole of left lobe that may be parathyroid in nature  -pt was to get ?sestamibi scan but left because he had appt with Dr. Lundy (did not get scan, as Dr. Lundy sent him to ER)  -Surgery consulted, will f/u recs regarding which imaging modality and if plan for surgery  -will continue IVF at 100cc/hr for 10 hrs. Pt s/p pamidronate and calcitonin x1, trend bmp  -Will consult endocrine and nephro in AM.   -Family wants to be called if another calcitonin dose is planned to be given as they are worried that pt had received mult dosages already recently.

## 2023-01-13 NOTE — H&P ADULT - ASSESSMENT
78 y/o male w/ HTN, HLD, Prostate ca (no tx), OCD, hyperthyroidism, CKD and recent hospitalization at [place] last week for hypercalcemia presents back to the ER from Dr. Lundy's office for hypercalcemia. ***Pt has separate MRN at [facility] recently discharged 6 days ago.    76 y/o male w/ HTN, HLD, Prostate ca (no tx), OCD, hyperthyroidism, CKD and recent hospitalization at [place] last week for hypercalcemia presents back to the ER from Dr. Lundy's office for hypercalcemia.

## 2023-01-13 NOTE — PROGRESS NOTE ADULT - PROBLEM SELECTOR PLAN 5
resume home meds #Hyperlipidemia  Continue home amlodipine 10mg QD, cardura 4mg QD, atorvastatin 10mg QHS.

## 2023-01-13 NOTE — CONSULT NOTE ADULT - ATTENDING COMMENTS
Severe primary hyperparathyroidism (PTH 500s), given rapidity of onset there is also concern for parathyroid carcinoma. Case discussed in detail with Dr. Lundy.  Patient is s/p pamidronate with improvement and expect further improvement over 3 days. The lasting effect of calcium control x 2 weeks.  Ideally plan surgery during this time. GFR 30 will trend.  Patient also with sizable thyroid nodules - Dr. Lundy likely to perform thyroid lobectomy at time of parathyroid surgery.  Await MRI for surgical planning.  Patient continues on cinacalcet 30mg BID, not increasing dose now as recently given pamidronate.   If surgery occurs after pamidronate wearing off may raise to higher dose cinacalcet until surgery.  25OHD not low (62.7).  High risk patient high level decision making.    Yumiko Bello MD  Division of Endocrinology  Pager: [phone]    If after 6PM or before 9AM, or on weekends/holidays, please call endocrine answering service for assistance ([phone]).  For nonurgent matters email [email or web address] for assistance.

## 2023-01-13 NOTE — H&P ADULT - NSHPREVIEWOFSYSTEMS_GEN_ALL_CORE
REVIEW OF SYSTEMS:    CONSTITUTIONAL: +generalized weakness, no fevers or chills  EYES/ENT: No visual changes;  No dysphagia  NECK: No pain or stiffness  RESPIRATORY: No cough, wheezing, hemoptysis; No shortness of breath  CARDIOVASCULAR: No chest pain or palpitations; No lower extremity edema  GASTROINTESTINAL: +abd pain, nausea and constipation. No vomiting or hematemesis; No diarrhea. No melena or hematochezia.  GENITOURINARY: No dysuria, frequency or hematuria  NEUROLOGICAL: +intermittent confusion. No numbness or weakness  SKIN: No itching, burning, rashes, or lesions   MSK: no back pain

## 2023-01-13 NOTE — PROGRESS NOTE ADULT - PROBLEM SELECTOR PLAN 2
check FT4. TSH of 0.4. Continue methimazole 2.5mg daily. Consult endocrine in AM TSH 0.4, Free T4 1.4. Continue methimazole 2.5mg daily.

## 2023-01-13 NOTE — CONSULT NOTE ADULT - SUBJECTIVE AND OBJECTIVE BOX
ENDOCRINE INITIAL CONSULT - Hypercalcemia     HPI:  ***Pt has separate MRN at [facility] recently discharged 6 days ago.    76 y/o male w/ HTN, HLD, Prostate ca (no tx), OCD, hyperthyroidism, CKD and recent hospitalization at [place] last week for hypercalcemia presents back to the ER from Dr. Lundy's office for hypercalcemia.    He had initially gone to [facility] on 1/5 for high outpatient Ca labs in addition to no appetite, constipation, intermittent confusion and weakness in his legs. He was found with ca to 16 and given fluids and calcitonin up to four times. His PTH was close to 500, and he was found with new CKD. His calcium ultimately improved to 12.3 and he had a parathyroid US which showed a 2cm nodule along posterior aspect of lower pole of L lobe and had a sestemibi scan ordered but this was discontinued as he had an appt with Dr. Lundy today, an endocrine surgeon.     Since discharge he continued to be very weak and with no appetite, still constipated with some intermittent abd pain. He was also noted by family to be intermittently confused as well. Today at his visit with Dr. Lundy he was found again hypercalcemic and sent to the ER. He denies fever, chills, cough, chest pain, shortness of breath, diarrhea or dysuria. He was given fluids, calcitonin and pamidronate in the ER.  (13 Jan 2023 00:22)      ENDOCRINE HISTORY     PAST MEDICAL & SURGICAL HISTORY:  HTN (hypertension)      Hyperlipidemia      History of hyperthyroidism      OCD (obsessive compulsive disorder)      Prostate cancer      Chronic kidney disease, unspecified CKD stage      No significant past surgical history          FAMILY HISTORY:  No pertinent family history in first degree relatives        Social History:  no smoking (13 Jan 2023 00:22)      Home Medications:  amLODIPine 10 mg oral tablet: 1 tab(s) orally once a day (13 Jan 2023 00:34)  aspirin 81 mg oral tablet, chewable: 1 tab(s) orally once a day (13 Jan 2023 00:34)  Carafate 1 g/10 mL oral suspension: 10 milliliter(s) orally once a day (13 Jan 2023 00:35)  cinacalcet 30 mg oral tablet: 1 tab(s) orally 2 times a day (13 Jan 2023 00:35)  methIMAzole 5 mg oral tablet: 0.5 tab(s) orally once a day (13 Jan 2023 00:36)  omeprazole 20 mg oral delayed release capsule: 1 cap(s) orally once a day (13 Jan 2023 00:38)  PARoxetine 20 mg oral tablet: 1 tab(s) orally once a day (13 Jan 2023 00:37)  PARoxetine 30 mg oral tablet: 1 tab(s) orally once a day (13 Jan 2023 00:37)  pravastatin 20 mg oral tablet: 1 tab(s) orally once a day (13 Jan 2023 00:38)  terazosin 5 mg oral capsule: 1 cap(s) orally once a day (at bedtime) (13 Jan 2023 00:39)      MEDICATIONS  (STANDING):  amLODIPine   Tablet 10 milliGRAM(s) Oral daily  atorvastatin 10 milliGRAM(s) Oral at bedtime  cinacalcet 30 milliGRAM(s) Oral two times a day  doxazosin 4 milliGRAM(s) Oral at bedtime  heparin   Injectable 5000 Unit(s) SubCutaneous every 12 hours  methimazole 2.5 milliGRAM(s) Oral daily  pantoprazole    Tablet 40 milliGRAM(s) Oral before breakfast  PARoxetine 50 milliGRAM(s) Oral daily  potassium chloride   Powder 40 milliEquivalent(s) Oral once  sodium chloride 0.9%. 1000 milliLiter(s) (100 mL/Hr) IV Continuous <Continuous>  sucralfate 1 Gram(s) Oral <User Schedule>    MEDICATIONS  (PRN):  acetaminophen     Tablet .. 650 milliGRAM(s) Oral every 6 hours PRN Temp greater or equal to 38C (100.4F), Mild Pain (1 - 3)      Allergies    No Known Allergies    Intolerances        REVIEW OF SYSTEMS  Constitutional: No fever  Eyes: No blurry vision  Neuro: No tremors  HEENT: No pain  Cardiovascular: No chest pain, palpitations  Respiratory: No SOB, no cough  GI: No nausea, vomiting, abdominal pain  : No dysuria  Skin: no rash  Psych: no depression  Endocrine: no polyuria, polydipsia  Hem/lymph: no swelling  Osteoporosis: no fractures  ALL OTHER SYSTEMS REVIEWED AND NEGATIVE     UNABLE TO OBTAIN     PHYSICAL EXAM   Vital Signs Last 24 Hrs  T(C): 36.6 (13 Jan 2023 05:40), Max: 37.2 (13 Jan 2023 00:39)  T(F): 97.8 (13 Jan 2023 05:40), Max: 98.9 (13 Jan 2023 00:39)  HR: 78 (13 Jan 2023 05:40) (72 - 93)  BP: 152/72 (13 Jan 2023 05:40) (140/71 - 157/83)  BP(mean): --  RR: 18 (13 Jan 2023 05:40) (17 - 18)  SpO2: 98% (13 Jan 2023 05:40) (97% - 99%)    Parameters below as of 13 Jan 2023 05:40  Patient On (Oxygen Delivery Method): room air      GENERAL: NAD, well-groomed, well-developed  EYES: No proptosis, no lid lag, anicteric  HEENT:  Atraumatic, Normocephalic, moist mucous membranes  THYROID: Normal size, no palpable nodules  RESPIRATORY: Clear to auscultation bilaterally; No rales, rhonchi, wheezing  CARDIOVASCULAR: Regular rate and rhythm; No murmurs; no peripheral edema  GI: Soft, nontender, non distended, normal bowel sounds  SKIN: Dry, intact, No rashes or lesions  MUSCULOSKELETAL: Full range of motion, normal strength  NEURO: sensation intact, extraocular movements intact, no tremor  PSYCH: Alert and oriented x 3, normal affect, normal mood  CUSHING'S SIGNS: no striae    CAPILLARY BLOOD GLUCOSE                                    9.6    7.83  )-----------( 212      ( 13 Jan 2023 05:40 )             30.2       01-13    140  |  107  |  32<H>  ----------------------------<  94  3.1<L>   |  25  |  2.22<H>    eGFR: 30 mL/min/1.73m2 (01.13.23 @ 05:40)      Ca    11.8<H>      13 Jan 2023 05:40  Phos  2.6     01-12  Mg     1.90     01-13        TPro  5.7<L>  /  Alb  3.4  /  TBili  0.4  /  DBili  <0.2  /  AST  17  /  ALT  18  /  AlkPhos  57  01-13  Free Thyroxine, Serum: 1.4 ng/dL (01.13.23 @ 05:40)  Thyroid Stimulating Hormone, Serum: 0.40 uIU/mL (01-12-23 @ 18:00)      LIPIDS    RADIOLOGY ENDOCRINE INITIAL CONSULT - Hypercalcemia     HPI:  ***Pt has separate MRN at [facility] recently discharged 6 days ago.    78 y/o male w/ HTN, HLD, Prostate ca (no tx), OCD, hyperthyroidism, CKD and recent hospitalization at [place] last week for hypercalcemia presents back to the ER from Dr. Lundy's office for hypercalcemia.    He had initially gone to [facility] on 1/5 for high outpatient Ca labs in addition to no appetite, constipation, intermittent confusion and weakness in his legs. He was found with ca to 16 and given fluids and calcitonin up to four times. His PTH was close to 500, and he was found with new CKD. His calcium ultimately improved to 12.3 and he had a parathyroid US which showed a 2cm nodule along posterior aspect of lower pole of L lobe and had a sestemibi scan ordered but this was discontinued as he had an appt with Dr. Lundy today, an endocrine surgeon.     Since discharge he continued to be very weak and with no appetite, still constipated with some intermittent abd pain. He was also noted by family to be intermittently confused as well. Today at his visit with Dr. Lundy he was found again hypercalcemic and sent to the ER. He denies fever, chills, cough, chest pain, shortness of breath, diarrhea or dysuria. He was given fluids, calcitonin and pamidronate in the ER.  (13 Jan 2023 00:22)      ENDOCRINE HISTORY   Patient seen earlier today while in the ED. Resting comfortably, eating breakfast.   Reports that he did not have a problem with his calcium levels until October 2022. It was noted to be around 10 he says on outpatient labs. Was admitted to [facility] last week as his calcium levels became very high, nearly 16, and patient reports he was not feeling like himself, was confused. Reports he has been feeling dizzy at times, confused, has been having more constipation. Felt better after getting 4 doses of calcitonin there and was discharged with plans to follow up with Dr. Lundy in a couple of days but he reports that he again became symptomatic & his family brought him in. Again, feeling more like himself now that the calcium is trending down patient says.   No prior hx of kidney stones, renal dysfunction prior to October 2022, bone disease, fractures.   Takes omeprazole for GERD, does not use any other antacids. No other ca supplements. Used to take D3 5000 units every other day but he was advised to stop this by one of his doctors, does not recall who.   No known family hx of parathyroid or calcium disorders.   Has a family hx of thyroid disease. Granddaughter with Graves. He also has hyperthyroidism & has had thyroid nodules for many years, 10-20 years at least he says.   Patient reports that when he was initially diagnosed with thyroid nodules/hyperthyroidism, he was told that he did not have Graves. Has US monitoring of his thyroid nodules regularly. Follows with his endocrinologist every 6 months for labs/US in her office - does not recall her name, located on [address] Takes MMI 2.5mg daily (1/2 tab) for his hyperthyroidism. Not on a beta blocker. Had a NM thyroid uptake/scan at the time of dx and had a bx of one of the thyroid nodules which he reports was benign.      PAST MEDICAL & SURGICAL HISTORY:  HTN (hypertension)      Hyperlipidemia      History of hyperthyroidism      OCD (obsessive compulsive disorder)      Prostate cancer      Chronic kidney disease, unspecified CKD stage      No significant past surgical history          FAMILY HISTORY:  No pertinent family history in first degree relatives        Social History:  no smoking (13 Jan 2023 00:22)      Home Medications:  amLODIPine 10 mg oral tablet: 1 tab(s) orally once a day (13 Jan 2023 00:34)  aspirin 81 mg oral tablet, chewable: 1 tab(s) orally once a day (13 Jan 2023 00:34)  Carafate 1 g/10 mL oral suspension: 10 milliliter(s) orally once a day (13 Jan 2023 00:35)  cinacalcet 30 mg oral tablet: 1 tab(s) orally 2 times a day (13 Jan 2023 00:35)  methIMAzole 5 mg oral tablet: 0.5 tab(s) orally once a day (13 Jan 2023 00:36)  omeprazole 20 mg oral delayed release capsule: 1 cap(s) orally once a day (13 Jan 2023 00:38)  PARoxetine 20 mg oral tablet: 1 tab(s) orally once a day (13 Jan 2023 00:37)  PARoxetine 30 mg oral tablet: 1 tab(s) orally once a day (13 Jan 2023 00:37)  pravastatin 20 mg oral tablet: 1 tab(s) orally once a day (13 Jan 2023 00:38)  terazosin 5 mg oral capsule: 1 cap(s) orally once a day (at bedtime) (13 Jan 2023 00:39)      MEDICATIONS  (STANDING):  amLODIPine   Tablet 10 milliGRAM(s) Oral daily  atorvastatin 10 milliGRAM(s) Oral at bedtime  cinacalcet 30 milliGRAM(s) Oral two times a day  doxazosin 4 milliGRAM(s) Oral at bedtime  heparin   Injectable 5000 Unit(s) SubCutaneous every 12 hours  methimazole 2.5 milliGRAM(s) Oral daily  pantoprazole    Tablet 40 milliGRAM(s) Oral before breakfast  PARoxetine 50 milliGRAM(s) Oral daily  potassium chloride   Powder 40 milliEquivalent(s) Oral once  sodium chloride 0.9%. 1000 milliLiter(s) (100 mL/Hr) IV Continuous <Continuous>  sucralfate 1 Gram(s) Oral <User Schedule>    MEDICATIONS  (PRN):  acetaminophen     Tablet .. 650 milliGRAM(s) Oral every 6 hours PRN Temp greater or equal to 38C (100.4F), Mild Pain (1 - 3)      Allergies    No Known Allergies    Intolerances        REVIEW OF SYSTEMS  Constitutional: No fever  Eyes: No blurry vision  Neuro: No tremors  HEENT: No pain  Cardiovascular: No chest pain, palpitations  Respiratory: No SOB, no cough  GI: No nausea, vomiting, abdominal pain  : No dysuria  Skin: no rash  Psych: no depression  Endocrine: no polyuria, polydipsia  Hem/lymph: no swelling  Osteoporosis: no fractures  ALL OTHER SYSTEMS REVIEWED AND NEGATIVE       PHYSICAL EXAM   Vital Signs Last 24 Hrs  T(C): 36.6 (13 Jan 2023 05:40), Max: 37.2 (13 Jan 2023 00:39)  T(F): 97.8 (13 Jan 2023 05:40), Max: 98.9 (13 Jan 2023 00:39)  HR: 78 (13 Jan 2023 05:40) (72 - 93)  BP: 152/72 (13 Jan 2023 05:40) (140/71 - 157/83)  BP(mean): --  RR: 18 (13 Jan 2023 05:40) (17 - 18)  SpO2: 98% (13 Jan 2023 05:40) (97% - 99%)    Parameters below as of 13 Jan 2023 05:40  Patient On (Oxygen Delivery Method): room air      GENERAL: NAD, well-groomed, well-developed  EYES: No proptosis, no lid lag, anicteric  HEENT:  Atraumatic, Normocephalic, moist mucous membranes  THYROID: Normal size, nontender, mild palpable nodularity  on L. side   RESPIRATORY: Clear to auscultation bilaterally; No rales, rhonchi, wheezing  CARDIOVASCULAR: Regular rate and rhythm; no peripheral edema  GI: Soft, nontender, non distended, normal bowel sounds  SKIN: Dry, intact, No rashes or lesions  MUSCULOSKELETAL: Full range of motion, normal strength  NEURO: sensation intact, extraocular movements intact, mild tremors of his hands noted   PSYCH: Alert and oriented x 3, reactive affect/mood   CUSHING'S SIGNS: no striae    CAPILLARY BLOOD GLUCOSE                                    9.6    7.83  )-----------( 212      ( 13 Jan 2023 05:40 )             30.2       01-13    140  |  107  |  32<H>  ----------------------------<  94  3.1<L>   |  25  |  2.22<H>    eGFR: 30 mL/min/1.73m2 (01.13.23 @ 05:40)      Ca    11.8<H>      13 Jan 2023 05:40  Phos  2.6     01-12  Mg     1.90     01-13        TPro  5.7<L>  /  Alb  3.4  /  TBili  0.4  /  DBili  <0.2  /  AST  17  /  ALT  18  /  AlkPhos  57  01-13  Free Thyroxine, Serum: 1.4 ng/dL (01.13.23 @ 05:40)  Thyroid Stimulating Hormone, Serum: 0.40 uIU/mL (01-12-23 @ 18:00)      LIPIDS    RADIOLOGY ENDOCRINE INITIAL CONSULT - Hypercalcemia     HPI:  ***Pt has separate MRN at [facility] recently discharged 6 days ago.    78 y/o male w/ HTN, HLD, Prostate ca (no tx), OCD, hyperthyroidism, CKD and recent hospitalization at [place] last week for hypercalcemia presents back to the ER from Dr. Lundy's office for hypercalcemia.    He had initially gone to [facility] on 1/5 for high outpatient Ca labs in addition to no appetite, constipation, intermittent confusion and weakness in his legs. He was found with ca to 16 and given fluids and calcitonin up to four times. His PTH was close to 500, and he was found with new CKD. His calcium ultimately improved to 12.3 and he had a parathyroid US which showed a 2cm nodule along posterior aspect of lower pole of L lobe and had a sestemibi scan ordered but this was discontinued as he had an appt with Dr. Lundy today, an endocrine surgeon.     Since discharge he continued to be very weak and with no appetite, still constipated with some intermittent abd pain. He was also noted by family to be intermittently confused as well. Today at his visit with Dr. Lundy he was found again hypercalcemic and sent to the ER. He denies fever, chills, cough, chest pain, shortness of breath, diarrhea or dysuria. He was given fluids, calcitonin and pamidronate in the ER.  (13 Jan 2023 00:22)      ENDOCRINE HISTORY   Patient seen earlier today while in the ED. Resting comfortably, eating breakfast.   Reports that he did not have a problem with his calcium levels until October 2022. It was noted to be around 10 he says on outpatient labs. Was admitted to [facility] last week as his calcium levels became very high, nearly 16, and patient reports he was not feeling like himself, was confused. Reports he has been feeling dizzy at times, confused, has been having more constipation. Felt better after getting 4 doses of calcitonin there and was discharged with plans to follow up with Dr. Lundy in a couple of days but he reports that he again became symptomatic & his family brought him in. Again, feeling more like himself now that the calcium is trending down patient says. Patient was also started on Cinacalcet 30mg BID while at [facility] per review of [facility] records.   No prior hx of kidney stones, renal dysfunction prior to October 2022, bone disease, fractures.   Takes omeprazole for GERD, does not use any other antacids. No other ca supplements. Used to take D3 5000 units every other day but he was advised to stop this by one of his doctors, does not recall who.   No known family hx of parathyroid or calcium disorders.   Has a family hx of thyroid disease. Granddaughter with Graves. He also has hyperthyroidism & has had thyroid nodules for many years, 10-20 years at least he says.   Patient reports that when he was initially diagnosed with thyroid nodules/hyperthyroidism, he was told that he did not have Graves. Has US monitoring of his thyroid nodules regularly. Follows with his endocrinologist every 6 months for labs/US in her office - does not recall her name, located [address] Takes MMI 2.5mg daily (1/2 tab) for his hyperthyroidism. Not on a beta blocker. Had a NM thyroid uptake/scan at the time of dx and had a bx of one of the thyroid nodules which he reports was benign.      PAST MEDICAL & SURGICAL HISTORY:  HTN (hypertension)      Hyperlipidemia      History of hyperthyroidism      OCD (obsessive compulsive disorder)      Prostate cancer      Chronic kidney disease, unspecified CKD stage      No significant past surgical history          FAMILY HISTORY:  No pertinent family history in first degree relatives        Social History:  no smoking (13 Jan 2023 00:22)      Home Medications:  amLODIPine 10 mg oral tablet: 1 tab(s) orally once a day (13 Jan 2023 00:34)  aspirin 81 mg oral tablet, chewable: 1 tab(s) orally once a day (13 Jan 2023 00:34)  Carafate 1 g/10 mL oral suspension: 10 milliliter(s) orally once a day (13 Jan 2023 00:35)  cinacalcet 30 mg oral tablet: 1 tab(s) orally 2 times a day (13 Jan 2023 00:35)  methIMAzole 5 mg oral tablet: 0.5 tab(s) orally once a day (13 Jan 2023 00:36)  omeprazole 20 mg oral delayed release capsule: 1 cap(s) orally once a day (13 Jan 2023 00:38)  PARoxetine 20 mg oral tablet: 1 tab(s) orally once a day (13 Jan 2023 00:37)  PARoxetine 30 mg oral tablet: 1 tab(s) orally once a day (13 Jan 2023 00:37)  pravastatin 20 mg oral tablet: 1 tab(s) orally once a day (13 Jan 2023 00:38)  terazosin 5 mg oral capsule: 1 cap(s) orally once a day (at bedtime) (13 Jan 2023 00:39)      MEDICATIONS  (STANDING):  amLODIPine   Tablet 10 milliGRAM(s) Oral daily  atorvastatin 10 milliGRAM(s) Oral at bedtime  cinacalcet 30 milliGRAM(s) Oral two times a day  doxazosin 4 milliGRAM(s) Oral at bedtime  heparin   Injectable 5000 Unit(s) SubCutaneous every 12 hours  methimazole 2.5 milliGRAM(s) Oral daily  pantoprazole    Tablet 40 milliGRAM(s) Oral before breakfast  PARoxetine 50 milliGRAM(s) Oral daily  potassium chloride   Powder 40 milliEquivalent(s) Oral once  sodium chloride 0.9%. 1000 milliLiter(s) (100 mL/Hr) IV Continuous <Continuous>  sucralfate 1 Gram(s) Oral <User Schedule>    MEDICATIONS  (PRN):  acetaminophen     Tablet .. 650 milliGRAM(s) Oral every 6 hours PRN Temp greater or equal to 38C (100.4F), Mild Pain (1 - 3)      Allergies    No Known Allergies    Intolerances        REVIEW OF SYSTEMS  Constitutional: No fever  Eyes: No blurry vision  Neuro: No tremors  HEENT: No pain  Cardiovascular: No chest pain, palpitations  Respiratory: No SOB, no cough  GI: No nausea, vomiting, abdominal pain  : No dysuria  Skin: no rash  Psych: no depression  Endocrine: no polyuria, polydipsia  Hem/lymph: no swelling  Osteoporosis: no fractures  ALL OTHER SYSTEMS REVIEWED AND NEGATIVE       PHYSICAL EXAM   Vital Signs Last 24 Hrs  T(C): 36.6 (13 Jan 2023 05:40), Max: 37.2 (13 Jan 2023 00:39)  T(F): 97.8 (13 Jan 2023 05:40), Max: 98.9 (13 Jan 2023 00:39)  HR: 78 (13 Jan 2023 05:40) (72 - 93)  BP: 152/72 (13 Jan 2023 05:40) (140/71 - 157/83)  BP(mean): --  RR: 18 (13 Jan 2023 05:40) (17 - 18)  SpO2: 98% (13 Jan 2023 05:40) (97% - 99%)    Parameters below as of 13 Jan 2023 05:40  Patient On (Oxygen Delivery Method): room air      GENERAL: NAD, well-groomed, well-developed  EYES: No proptosis, no lid lag, anicteric  HEENT:  Atraumatic, Normocephalic, moist mucous membranes  THYROID: Normal size, nontender, mild palpable nodularity  on L. side   RESPIRATORY: Clear to auscultation bilaterally; No rales, rhonchi, wheezing  CARDIOVASCULAR: Regular rate and rhythm; no peripheral edema  GI: Soft, nontender, non distended, normal bowel sounds  SKIN: Dry, intact, No rashes or lesions  MUSCULOSKELETAL: Full range of motion, normal strength  NEURO: sensation intact, extraocular movements intact, mild tremors of his hands noted   PSYCH: Alert and oriented x 3, reactive affect/mood   CUSHING'S SIGNS: no striae    CAPILLARY BLOOD GLUCOSE                                    9.6    7.83  )-----------( 212      ( 13 Jan 2023 05:40 )             30.2       01-13    140  |  107  |  32<H>  ----------------------------<  94  3.1<L>   |  25  |  2.22<H>    eGFR: 30 mL/min/1.73m2 (01.13.23 @ 05:40)      Ca    11.8<H>      13 Jan 2023 05:40  Phos  2.6     01-12  Mg     1.90     01-13        TPro  5.7<L>  /  Alb  3.4  /  TBili  0.4  /  DBili  <0.2  /  AST  17  /  ALT  18  /  AlkPhos  57  01-13  Free Thyroxine, Serum: 1.4 ng/dL (01.13.23 @ 05:40)  Thyroid Stimulating Hormone, Serum: 0.40 uIU/mL (01-12-23 @ 18:00)      LIPIDS    RADIOLOGY

## 2023-01-13 NOTE — PROGRESS NOTE ADULT - ATTENDING COMMENTS
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 s/p fluids, calcitonin and pamidronate. Ca improving. Endocrine and surgery following. No surgical intervention planned at this time. f/u endo recs   Hyperthyroidism- Continue Methimazole. f/u TFTs  Rest of care as per resident note. 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 s/p fluids, calcitonin and pamidronate. Ca improving. Endocrine and surgery following. No surgical intervention planned at this time. f/u endo recs - MRI soft tissue neck   Hyperthyroidism- Continue Methimazole. f/u TFTs  Rest of care as per resident note. Discussed with HS team

## 2023-01-13 NOTE — PROGRESS NOTE ADULT - ASSESSMENT
***Pt has separate MRN at [facility] recently discharged 6 days ago.    78 y/o male w/ HTN, HLD, Prostate ca (no tx), OCD, hyperthyroidism, CKD and recent hospitalization at [place] last week for hypercalcemia presents back to the ER from Dr. Lundy's office for hypercalcemia. ***Pt has separate MRN at [facility] recently discharged 6 days ago.    76 y/o male w/ HTN, HLD, Prostate ca (no tx), OCD, hyperthyroidism, CKD and recent hospitalization at [place] last week for hypercalcemia presents back to the ER from Dr. Lundy's office for hypercalcemia. 77M w/ hx HTN, HLD, Prostate ca (no tx), OCD, hyperthyroidism, CKD and recent hospitalization at [place] last week for hypercalcemia presents back to the ER from Dr. Lundy's office for hypercalcemia.    ***Pt has separate MRN at [facility] recently discharged 6 days ago.

## 2023-01-13 NOTE — H&P ADULT - NSICDXPASTMEDICALHX_GEN_ALL_CORE_FT
PAST MEDICAL HISTORY:  Chronic kidney disease, unspecified CKD stage     History of hyperthyroidism     HTN (hypertension)     Hyperlipidemia     OCD (obsessive compulsive disorder)     Prostate cancer

## 2023-01-14 LAB
ANION GAP SERPL CALC-SCNC: 8 MMOL/L — SIGNIFICANT CHANGE UP (ref 7–14)
BUN SERPL-MCNC: 32 MG/DL — HIGH (ref 7–23)
CA-I BLD-SCNC: 1.55 MMOL/L — HIGH (ref 1.15–1.29)
CALCIUM SERPL-MCNC: 11 MG/DL — HIGH (ref 8.4–10.5)
CHLORIDE SERPL-SCNC: 107 MMOL/L — SIGNIFICANT CHANGE UP (ref 98–107)
CO2 SERPL-SCNC: 24 MMOL/L — SIGNIFICANT CHANGE UP (ref 22–31)
CREAT SERPL-MCNC: 2.21 MG/DL — HIGH (ref 0.5–1.3)
EGFR: 30 ML/MIN/1.73M2 — LOW
GLUCOSE SERPL-MCNC: 90 MG/DL — SIGNIFICANT CHANGE UP (ref 70–99)
HCT VFR BLD CALC: 29.6 % — LOW (ref 39–50)
HGB BLD-MCNC: 9.6 G/DL — LOW (ref 13–17)
MAGNESIUM SERPL-MCNC: 1.6 MG/DL — SIGNIFICANT CHANGE UP (ref 1.6–2.6)
MCHC RBC-ENTMCNC: 27.6 PG — SIGNIFICANT CHANGE UP (ref 27–34)
MCHC RBC-ENTMCNC: 32.4 GM/DL — SIGNIFICANT CHANGE UP (ref 32–36)
MCV RBC AUTO: 85.1 FL — SIGNIFICANT CHANGE UP (ref 80–100)
NRBC # BLD: 0 /100 WBCS — SIGNIFICANT CHANGE UP (ref 0–0)
NRBC # FLD: 0 K/UL — SIGNIFICANT CHANGE UP (ref 0–0)
PHOSPHATE SERPL-MCNC: 1.6 MG/DL — LOW (ref 2.5–4.5)
PLATELET # BLD AUTO: 223 K/UL — SIGNIFICANT CHANGE UP (ref 150–400)
POTASSIUM SERPL-MCNC: 3.1 MMOL/L — LOW (ref 3.5–5.3)
POTASSIUM SERPL-SCNC: 3.1 MMOL/L — LOW (ref 3.5–5.3)
PTH RELATED PROT SERPL-MCNC: <2 PMOL/L — SIGNIFICANT CHANGE UP
RBC # BLD: 3.48 M/UL — LOW (ref 4.2–5.8)
RBC # FLD: 15.1 % — HIGH (ref 10.3–14.5)
SODIUM SERPL-SCNC: 139 MMOL/L — SIGNIFICANT CHANGE UP (ref 135–145)
WBC # BLD: 7.02 K/UL — SIGNIFICANT CHANGE UP (ref 3.8–10.5)
WBC # FLD AUTO: 7.02 K/UL — SIGNIFICANT CHANGE UP (ref 3.8–10.5)

## 2023-01-14 PROCEDURE — 99232 SBSQ HOSP IP/OBS MODERATE 35: CPT | Mod: GC

## 2023-01-14 RX ORDER — POTASSIUM CHLORIDE 20 MEQ
40 PACKET (EA) ORAL ONCE
Refills: 0 | Status: COMPLETED | OUTPATIENT
Start: 2023-01-14 | End: 2023-01-14

## 2023-01-14 RX ORDER — SODIUM CHLORIDE 9 MG/ML
1000 INJECTION INTRAMUSCULAR; INTRAVENOUS; SUBCUTANEOUS
Refills: 0 | Status: DISCONTINUED | OUTPATIENT
Start: 2023-01-14 | End: 2023-01-15

## 2023-01-14 RX ORDER — SODIUM,POTASSIUM PHOSPHATES 278-250MG
1 POWDER IN PACKET (EA) ORAL THREE TIMES A DAY
Refills: 0 | Status: COMPLETED | OUTPATIENT
Start: 2023-01-14 | End: 2023-01-15

## 2023-01-14 RX ADMIN — HEPARIN SODIUM 5000 UNIT(S): 5000 INJECTION INTRAVENOUS; SUBCUTANEOUS at 17:23

## 2023-01-14 RX ADMIN — Medication 4 MILLIGRAM(S): at 22:15

## 2023-01-14 RX ADMIN — CINACALCET 30 MILLIGRAM(S): 30 TABLET, FILM COATED ORAL at 06:21

## 2023-01-14 RX ADMIN — Medication 1 PACKET(S): at 14:18

## 2023-01-14 RX ADMIN — Medication 1 GRAM(S): at 10:36

## 2023-01-14 RX ADMIN — Medication 40 MILLIEQUIVALENT(S): at 10:37

## 2023-01-14 RX ADMIN — AMLODIPINE BESYLATE 10 MILLIGRAM(S): 2.5 TABLET ORAL at 06:20

## 2023-01-14 RX ADMIN — Medication 50 MILLIGRAM(S): at 11:18

## 2023-01-14 RX ADMIN — PANTOPRAZOLE SODIUM 40 MILLIGRAM(S): 20 TABLET, DELAYED RELEASE ORAL at 06:20

## 2023-01-14 RX ADMIN — CINACALCET 30 MILLIGRAM(S): 30 TABLET, FILM COATED ORAL at 17:23

## 2023-01-14 RX ADMIN — Medication 1 PACKET(S): at 10:36

## 2023-01-14 RX ADMIN — Medication 1 PACKET(S): at 22:15

## 2023-01-14 RX ADMIN — ATORVASTATIN CALCIUM 10 MILLIGRAM(S): 80 TABLET, FILM COATED ORAL at 22:15

## 2023-01-14 RX ADMIN — HEPARIN SODIUM 5000 UNIT(S): 5000 INJECTION INTRAVENOUS; SUBCUTANEOUS at 06:20

## 2023-01-14 NOTE — PROGRESS NOTE ADULT - PROBLEM SELECTOR PLAN 3
Pt w/ new CKD on last admission, Cr stable at ~2.2. Cr around 2.0 from last admission, replete lytes.

## 2023-01-14 NOTE — PHYSICAL THERAPY INITIAL EVALUATION ADULT - ADDITIONAL COMMENTS
Pt states he lives with his wife in a house with 5 steps to enter, remains on the main floor. Prior to admission pt reports ambulating independently without a device and was independent in ADLs. Pt denies any recent fall history.    Following evaluation, pt was left semireclined in bed in no distress, all lines in tact, call brown in reach. YA Thomas aware.

## 2023-01-14 NOTE — PROGRESS NOTE ADULT - PROBLEM SELECTOR PLAN 1
On arrival w/ albumin corrected ca of 14.1, PTH of 476. VitD labs wnl. Recent [place] admission (discharged 6 days ago), s/p calcitonin and fluids there. Parathyroid US then showed 2cm nodule that may be parathyroid in nature. Had originally been planned for MRI/sestamibi(?) scan w/ Dr. Lundy though was sent to ER instead.  - s/p calcitonin x1 and pamidronate in ED; effects of bisphosphonate will start at day 3 and persist over ~2 weeks  - surgery consulted, no plan for acute surgical intervention  - per endo note, will f/u with Dr. Lundy and team re surgical planning (may pursue inpatient?)  - Ca improving, now 11 though iCa still high at 1.55  - will continue NS @ 100cc/hr given iCa  - continue cinecalcet 30mg BID  - monitor tele, EKG and BMP (Ca/albumin) daily  - check MRI neck soft tissue w/ and w/o contrast  - family requesting call if more calcitonin to be given (worried that multiple dosages have already been given)

## 2023-01-14 NOTE — PHYSICAL THERAPY INITIAL EVALUATION ADULT - PERTINENT HX OF CURRENT PROBLEM, REHAB EVAL
77 year old male with recent hospitalization at [place] last week for hypercalcemia presents back to the ER from Dr. Lundy's office for hypercalcemia.

## 2023-01-14 NOTE — PROGRESS NOTE ADULT - ATTENDING COMMENTS
77M w/ hx HTN, HLD, prostate ca (no tx), OCD, hyperthyroidism, CKD, recently hospitalized 1wk ago for hypercalcemia presents back to the ER from Dr. Lundy's office for hypercalcemia.    ***Pt has separate MRN at [facility] recently discharged 6 days ago.  f/w endocrinology recs  will replace phos, potassium   c/w IVF for another 24 hours as Ca 2+ elevated , re-eval labs in am

## 2023-01-14 NOTE — PROGRESS NOTE ADULT - ASSESSMENT
77M w/ hx HTN, HLD, prostate ca (no tx), OCD, hyperthyroidism, CKD, recently hospitalized 1wk ago for hypercalcemia presents back to the ER from Dr. Lundy's office for hypercalcemia.    ***Pt has separate MRN at [facility] recently discharged 6 days ago.

## 2023-01-15 ENCOUNTER — TRANSCRIPTION ENCOUNTER (OUTPATIENT)
Age: 78
End: 2023-01-15

## 2023-01-15 PROBLEM — E83.52 HYPERCALCEMIA OF MALIGNANCY: Status: RESOLVED | Noted: 2023-01-10 | Resolved: 2023-01-15

## 2023-01-15 LAB
ANION GAP SERPL CALC-SCNC: 8 MMOL/L — SIGNIFICANT CHANGE UP (ref 7–14)
BUN SERPL-MCNC: 30 MG/DL — HIGH (ref 7–23)
CA-I BLD-SCNC: 1.37 MMOL/L — HIGH (ref 1.15–1.29)
CALCIUM SERPL-MCNC: 9.8 MG/DL — SIGNIFICANT CHANGE UP (ref 8.4–10.5)
CHLORIDE SERPL-SCNC: 112 MMOL/L — HIGH (ref 98–107)
CO2 SERPL-SCNC: 23 MMOL/L — SIGNIFICANT CHANGE UP (ref 22–31)
CREAT SERPL-MCNC: 2.08 MG/DL — HIGH (ref 0.5–1.3)
EGFR: 33 ML/MIN/1.73M2 — LOW
GLUCOSE SERPL-MCNC: 86 MG/DL — SIGNIFICANT CHANGE UP (ref 70–99)
HCT VFR BLD CALC: 27.3 % — LOW (ref 39–50)
HGB BLD-MCNC: 8.9 G/DL — LOW (ref 13–17)
MAGNESIUM SERPL-MCNC: 1.1 MG/DL — LOW (ref 1.6–2.6)
MCHC RBC-ENTMCNC: 27.7 PG — SIGNIFICANT CHANGE UP (ref 27–34)
MCHC RBC-ENTMCNC: 32.6 GM/DL — SIGNIFICANT CHANGE UP (ref 32–36)
MCV RBC AUTO: 85 FL — SIGNIFICANT CHANGE UP (ref 80–100)
NRBC # BLD: 0 /100 WBCS — SIGNIFICANT CHANGE UP (ref 0–0)
NRBC # FLD: 0 K/UL — SIGNIFICANT CHANGE UP (ref 0–0)
PHOSPHATE SERPL-MCNC: 1.9 MG/DL — LOW (ref 2.5–4.5)
PLATELET # BLD AUTO: 198 K/UL — SIGNIFICANT CHANGE UP (ref 150–400)
POTASSIUM SERPL-MCNC: 3.2 MMOL/L — LOW (ref 3.5–5.3)
POTASSIUM SERPL-SCNC: 3.2 MMOL/L — LOW (ref 3.5–5.3)
RBC # BLD: 3.21 M/UL — LOW (ref 4.2–5.8)
RBC # FLD: 14.9 % — HIGH (ref 10.3–14.5)
SODIUM SERPL-SCNC: 143 MMOL/L — SIGNIFICANT CHANGE UP (ref 135–145)
WBC # BLD: 6.51 K/UL — SIGNIFICANT CHANGE UP (ref 3.8–10.5)
WBC # FLD AUTO: 6.51 K/UL — SIGNIFICANT CHANGE UP (ref 3.8–10.5)

## 2023-01-15 PROCEDURE — 99232 SBSQ HOSP IP/OBS MODERATE 35: CPT | Mod: GC

## 2023-01-15 RX ORDER — MAGNESIUM SULFATE 500 MG/ML
2 VIAL (ML) INJECTION ONCE
Refills: 0 | Status: COMPLETED | OUTPATIENT
Start: 2023-01-15 | End: 2023-01-15

## 2023-01-15 RX ORDER — POTASSIUM CHLORIDE 20 MEQ
20 PACKET (EA) ORAL
Refills: 0 | Status: COMPLETED | OUTPATIENT
Start: 2023-01-15 | End: 2023-01-15

## 2023-01-15 RX ADMIN — ATORVASTATIN CALCIUM 10 MILLIGRAM(S): 80 TABLET, FILM COATED ORAL at 20:18

## 2023-01-15 RX ADMIN — Medication 1 GRAM(S): at 08:31

## 2023-01-15 RX ADMIN — Medication 20 MILLIEQUIVALENT(S): at 12:49

## 2023-01-15 RX ADMIN — HEPARIN SODIUM 5000 UNIT(S): 5000 INJECTION INTRAVENOUS; SUBCUTANEOUS at 17:41

## 2023-01-15 RX ADMIN — CINACALCET 30 MILLIGRAM(S): 30 TABLET, FILM COATED ORAL at 06:11

## 2023-01-15 RX ADMIN — Medication 20 MILLIEQUIVALENT(S): at 08:31

## 2023-01-15 RX ADMIN — CINACALCET 30 MILLIGRAM(S): 30 TABLET, FILM COATED ORAL at 17:41

## 2023-01-15 RX ADMIN — Medication 4 MILLIGRAM(S): at 20:18

## 2023-01-15 RX ADMIN — Medication 50 MILLIGRAM(S): at 12:50

## 2023-01-15 RX ADMIN — SODIUM CHLORIDE 100 MILLILITER(S): 9 INJECTION INTRAMUSCULAR; INTRAVENOUS; SUBCUTANEOUS at 08:31

## 2023-01-15 RX ADMIN — AMLODIPINE BESYLATE 10 MILLIGRAM(S): 2.5 TABLET ORAL at 06:12

## 2023-01-15 RX ADMIN — Medication 20 MILLIEQUIVALENT(S): at 10:37

## 2023-01-15 RX ADMIN — Medication 1 PACKET(S): at 06:11

## 2023-01-15 RX ADMIN — Medication 25 GRAM(S): at 17:40

## 2023-01-15 RX ADMIN — PANTOPRAZOLE SODIUM 40 MILLIGRAM(S): 20 TABLET, DELAYED RELEASE ORAL at 06:12

## 2023-01-15 RX ADMIN — HEPARIN SODIUM 5000 UNIT(S): 5000 INJECTION INTRAVENOUS; SUBCUTANEOUS at 05:43

## 2023-01-15 RX ADMIN — Medication 85 MILLIMOLE(S): at 12:49

## 2023-01-15 NOTE — DISCHARGE NOTE PROVIDER - NSDCCPCAREPLAN_GEN_ALL_CORE_FT
PRINCIPAL DISCHARGE DIAGNOSIS  Diagnosis: Hypercalcemia  Assessment and Plan of Treatment: You were admitted to the hospital for hypercalcemia. Your calcium level was 14.2 in the ED, and you were given IV fluids, calcitonin, and pamidronate. Over the next few days, your calcium levels improved to ____. Pamidronate should also continue to have an effect over two weeks, which should help keep your calcium levels within a normal range. An MRI of your neck was obtained to further evaluate your parathyroid gland, showing _____. Based on discussion between our endocrinology team and Dr. Lundy, ______ was planned. Please seek immediate medical attention if you experience recurrent symptoms of hypercalcemia (e.g. abdominal discomfort, extremity weakness/numbness/tingling/tremors).       PRINCIPAL DISCHARGE DIAGNOSIS  Diagnosis: Hypercalcemia  Assessment and Plan of Treatment: You were admitted to the hospital for hypercalcemia. Your calcium level was 14.2 in the ED, and you were given IV fluids, calcitonin, and pamidronate. Over the next few days, your calcium levels normalized. Pamidronate should also continue to have an effect over two weeks, which should help keep your calcium levels within a normal range. Based on discussion between our endocrinology team and surgery team you were cleared for discharge with outpatient followup. Please seek immediate medical attention if you experience recurrent symptoms of hypercalcemia (e.g. abdominal discomfort, extremity weakness/numbness/tingling/tremors).

## 2023-01-15 NOTE — DISCHARGE NOTE PROVIDER - PROVIDER TOKENS
PROVIDER:[TOKEN:[[id number]],ESTABLISHEDPATIENT:[T]] PROVIDER:[TOKEN:[[id number]],ESTABLISHEDPATIENT:[T]],FREE:[LAST:[Your],FIRST:[Primary Doctor],PHONE:[(   )    -],FAX:[(   )    -],FOLLOWUP:[1 week]],PROVIDER:[TOKEN:[[id number]],FOLLOWUP:[1 week]]

## 2023-01-15 NOTE — REASON FOR VISIT
[Initial Consultation] : an initial consultation for [Spouse] : spouse [Family Member] : family member [FreeTextEntry2] : Hyperparathyroidism

## 2023-01-15 NOTE — DISCHARGE NOTE PROVIDER - CARE PROVIDERS DIRECT ADDRESSES
,[email or web address],DirectAddress_Unknown,DirectAddress_Unknown

## 2023-01-15 NOTE — DISCHARGE NOTE PROVIDER - NSDCCPTREATMENT_GEN_ALL_CORE_FT
PRINCIPAL PROCEDURE  Procedure: MRI of the neck  Findings and Treatment:        PRINCIPAL PROCEDURE  Procedure: MRI of the neck  Findings and Treatment: MRI showed diffusely enlarged thyroid gland. Asymmetrically enlarged left thyroid lobe. Left lower pole pole 3.5 cm heterogeneous nodule. Posterior medial to the left thyroid lobe 1.2 cm nodule may be exophytic thyroid versus parathyroid versus other in origin.

## 2023-01-15 NOTE — DISCHARGE NOTE PROVIDER - HOSPITAL COURSE
HPI:  78 y/o male w/ HTN, HLD, Prostate ca (no tx), OCD, hyperthyroidism, CKD and recent hospitalization at [place] last week for hypercalcemia presents back to the ER from Dr. Lundy's office for hypercalcemia.    He had initially gone to [facility] on 1/5 for high outpatient Ca labs in addition to no appetite, constipation, intermittent confusion and weakness in his legs. He was found with ca to 16 and given fluids and calcitonin up to four times. His PTH was close to 500, and he was found with new CKD. His calcium ultimately improved to 12.3 and he had a parathyroid US which showed a 2cm nodule along posterior aspect of lower pole of L lobe and had a sestemibi scan ordered but this was discontinued as he had an appt with Dr. Lundy today, an endocrine surgeon.     Since discharge he continued to be very weak and with no appetite, still constipated with some intermittent abd pain. He was also noted by family to be intermittently confused as well. Today at his visit with Dr. Lnudy he was found again hypercalcemic and sent to the ER. He denies fever, chills, cough, chest pain, shortness of breath, diarrhea or dysuria. He was given fluids, calcitonin and pamidronate in the ER.  (13 Jan 2023 00:22)    Hospital Course:  Hypercalcemia improved from 14.2 to 11.8 overnight. PTH on admission was 476. Vitamin D levels were wnl. Endocrine surgery was consulted to evaluate patient, determined that acute surgical intervention was not indicated. Endocrinology recommended continuation of IV fluids and cinecalcet, over subsequent days calcium levels normalized and iCa improved with time as well. On discharge, Ca was ____ and iCa was ____. As recommended by endo MRI neck was obtained for further evaluation, showing ____. Based on this imaging and discussion between yury and Dr. Lundy's team, surgical intervention was planned for _____. HPI:  76 y/o male w/ HTN, HLD, Prostate ca (no tx), OCD, hyperthyroidism, CKD and recent hospitalization at [place] last week for hypercalcemia presents back to the ER from Dr. Lundy's office for hypercalcemia.    He had initially gone to [facility] on 1/5 for high outpatient Ca labs in addition to no appetite, constipation, intermittent confusion and weakness in his legs. He was found with ca to 16 and given fluids and calcitonin up to four times. His PTH was close to 500, and he was found with new CKD. His calcium ultimately improved to 12.3 and he had a parathyroid US which showed a 2cm nodule along posterior aspect of lower pole of L lobe and had a sestemibi scan ordered but this was discontinued as he had an appt with Dr. Lundy today, an endocrine surgeon.     Since discharge he continued to be very weak and with no appetite, still constipated with some intermittent abd pain. He was also noted by family to be intermittently confused as well. Today at his visit with Dr. Lundy he was found again hypercalcemic and sent to the ER. He denies fever, chills, cough, chest pain, shortness of breath, diarrhea or dysuria. He was given fluids, calcitonin and pamidronate in the ER.  (13 Jan 2023 00:22)    Hospital Course:  Hypercalcemia improved from 14.2 to 11.8 overnight. PTH on admission was 476. Vitamin D levels were wnl. Endocrine surgery was consulted to evaluate patient, determined that acute surgical intervention was not indicated. Endocrinology recommended continuation of IV fluids and cinecalcet, over subsequent days calcium levels normalized and iCa improved with time as well. As recommended by endo MRI neck was obtained for further evaluation, showing ____.     Endocrine and surgery cleared patient for discharge with outpatient followup. HPI:  78 y/o male w/ HTN, HLD, Prostate ca (no tx), OCD, hyperthyroidism, CKD and recent hospitalization at [place] last week for hypercalcemia presents back to the ER from Dr. Lundy's office for hypercalcemia.    He had initially gone to [facility] on 1/5 for high outpatient Ca labs in addition to no appetite, constipation, intermittent confusion and weakness in his legs. He was found with ca to 16 and given fluids and calcitonin up to four times. His PTH was close to 500, and he was found with new CKD. His calcium ultimately improved to 12.3 and he had a parathyroid US which showed a 2cm nodule along posterior aspect of lower pole of L lobe and had a sestemibi scan ordered but this was discontinued as he had an appt with Dr. Lundy today, an endocrine surgeon.     Since discharge he continued to be very weak and with no appetite, still constipated with some intermittent abd pain. He was also noted by family to be intermittently confused as well. Today at his visit with Dr. Lundy he was found again hypercalcemic and sent to the ER. He denies fever, chills, cough, chest pain, shortness of breath, diarrhea or dysuria. He was given fluids, calcitonin and pamidronate in the ER.  (13 Jan 2023 00:22)    Hospital Course:  Hypercalcemia improved from 14.2 to 11.8 overnight. PTH on admission was 476. Vitamin D levels were wnl. Endocrine surgery was consulted to evaluate patient, determined that acute surgical intervention was not indicated. Endocrinology recommended continuation of IV fluids and cinecalcet, over subsequent days calcium levels normalized and iCa improved with time as well. As recommended by endo MRI neck was obtained for further evaluation, showed diffusely enlarged thyroid gland. Asymmetrically enlarged left thyroid lobe. Left lower pole pole 3.5 cm heterogeneous nodule. Posterior medial to the left thyroid lobe 1.2 cm nodule may be exophytic thyroid versus parathyroid versus other in origin.      Endocrine and surgery cleared patient for discharge with outpatient followup.

## 2023-01-15 NOTE — PROGRESS NOTE ADULT - ASSESSMENT
77M w/ hx HTN, HLD, prostate ca (no tx), OCD, hyperthyroidism, CKD, recently hospitalized 1wk ago for hypercalcemia presents back to the ER from Dr. Lundy's office for hypercalcemia.    ***Pt has separate MRN at [facility] recently discharged 6 days ago.

## 2023-01-15 NOTE — DISCHARGE NOTE PROVIDER - NSDCMRMEDTOKEN_GEN_ALL_CORE_FT
amLODIPine 10 mg oral tablet: 1 tab(s) orally once a day  amLODIPine 10 mg oral tablet: 1 tab(s) orally once a day  aspirin 81 mg oral tablet, chewable: 1 tab(s) orally once a day  Aspirin Enteric Coated 81 mg oral delayed release tablet: 1 tab(s) orally once a day (in the evening)  Carafate 1 g/10 mL oral suspension: 10 milliliter(s) orally once a day  Carafate 1 g/10 mL oral suspension: 10 milliliter(s) orally once a day   cinacalcet 30 mg oral tablet: 1 tab(s) orally 2 times a day  cinacalcet 30 mg oral tablet: 1 tab(s) orally 2 times a day  Flonase 50 mcg/inh nasal spray: 2 spray(s) in each nostril once a day  lycopene oral supplement: 1 cap(s) orally once a day  methIMAzole 5 mg oral tablet: 0.5 tab(s) orally once a day  methIMAzole 5 mg oral tablet: 0.5 tab(s) orally once a day  Multiple Vitamins oral tablet: 1 tab(s) orally once a day  omeprazole 20 mg oral delayed release capsule: 1 cap(s) orally once a day  omeprazole 20 mg oral delayed release tablet: 1 tab(s) orally once a day  PARoxetine 20 mg oral tablet: 1 tab(s) orally once a day  ***take with 30mg for TDD = 50mg***  PARoxetine 20 mg oral tablet: 1 tab(s) orally once a day  PARoxetine 30 mg oral tablet: 1 tab(s) orally once a day  PARoxetine 30 mg oral tablet: 1 tab(s) orally once a day  ***take with 20mg for TDD = 50mg***  pravastatin 20 mg oral tablet: 1 tab(s) orally once a day (at bedtime)  pravastatin 20 mg oral tablet: 1 tab(s) orally once a day  terazosin 5 mg oral capsule: 1 cap(s) orally once a day (at bedtime)  terazosin 5 mg oral capsule: 1 cap(s) orally once a day (at bedtime)  Vitamin D3: 1 cap(s) orally every other day   amLODIPine 10 mg oral tablet: 1 tab(s) orally once a day  aspirin 81 mg oral tablet, chewable: 1 tab(s) orally once a day  Carafate 1 g/10 mL oral suspension: 10 milliliter(s) orally once a day   cinacalcet 30 mg oral tablet: 1 tab(s) orally 2 times a day  Flonase 50 mcg/inh nasal spray: 2 spray(s) in each nostril once a day  methIMAzole 5 mg oral tablet: 0.5 tab(s) orally once a day  omeprazole 20 mg oral delayed release capsule: 1 cap(s) orally once a day  PARoxetine 20 mg oral tablet: 1 tab(s) orally once a day  ***take with 30mg for TDD = 50mg***  PARoxetine 30 mg oral tablet: 1 tab(s) orally once a day  ***take with 20mg for TDD = 50mg***  pravastatin 20 mg oral tablet: 1 tab(s) orally once a day (at bedtime)  terazosin 5 mg oral capsule: 1 cap(s) orally once a day (at bedtime)

## 2023-01-15 NOTE — PHYSICAL EXAM
[de-identified] : 3 cm left thyroid nodule, well circumscribed and mobile [Nasal Endoscopy Performed] : nasal endoscopy was performed, see procedure section for findings [Laryngoscopy Performed] : laryngoscopy was performed, see procedure section for findings [Midline] : located in midline position [Normal] : orientation to person, place, and time: normal [de-identified] : fiberoptic laryngoscopy shows normal vocal cord mobility bilaterally with no lesions noted

## 2023-01-15 NOTE — HISTORY OF PRESENT ILLNESS
[de-identified] : Pt c/o elevated calcium since October, bone pain, constipation, fatigue,  decrease  appetite and unsteady gait. denies dysphagia, hoarseness, SOB or RT exposure.  has been hospitalized for hypercalcemia and started on Sensipar. \par Ca 14.2,  \par sonogram Right 1.0 cm, 6 mm and 1.1 cm, Left 2.1 cm and 3.8 cm thyroid nodules and possible Left  parathyroid 2.0 cm\par I have reviewed all old and new data and available images. Additional information was obtained from others present at the time of visit to ensure the completeness of the history

## 2023-01-15 NOTE — DISCHARGE NOTE PROVIDER - NSDCFUSCHEDAPPT_GEN_ALL_CORE_FT
[facility]  MRI  [address]  Scheduled Appointment: 01/17/2023    Lynn Pedro  [facility]  ENDOCRIN [address]  Scheduled Appointment: 03/14/2023    [facility]  ORTHOSURG [address]  Scheduled Appointment: 03/30/2023     Lynn Pedro  [facility]  ENDOCRIN [address] [facility]  Scheduled Appointment: 03/14/2023    [facility]  [address]URG [address]  Scheduled Appointment: 03/30/2023     Low Lundy  [facility]  [address]ENSURG [address]  Scheduled Appointment: 02/08/2023    [facility]  GENSURG [address]  Scheduled Appointment: 02/16/2023    Lynn Pedro  [facility]  ENDOCRIN [address] [facility]  Scheduled Appointment: 03/14/2023    [facility]  ORTHOSURG [address]  Scheduled Appointment: 03/30/2023     [facility]  MRI  [address]  Scheduled Appointment: 01/19/2023    Low Lundy  [facility]  [address]  Scheduled Appointment: 02/08/2023    [facility]  GENSURG [address]  Scheduled Appointment: 02/16/2023    Lynn Pedro  [facility]  ENDOCRIN [address]  Scheduled Appointment: 03/14/2023    [facility]  ORTHOSURG [address]  Scheduled Appointment: 03/30/2023

## 2023-01-15 NOTE — DISCHARGE NOTE PROVIDER - NSDCFUADDAPPT_GEN_ALL_CORE_FT
Please follow up with your primary care physician regarding your hospitalization and for further monitoring/management as well as repeat blood work (CBC, BMP) to recheck your electrolytes.  Please also follow up with your Endocrinologist by Monday 1/23/2023 for adjustment of your calcium supplementation, if needed, based on your blood work which you should try to get done in the next 2 days. Please follow up with your primary care physician regarding your hospitalization and for further monitoring/management as well as repeat blood work (CBC, BMP) to recheck your electrolytes.  Please also follow up with your Endocrinologist by Monday 1/23/2023 for adjustment of your Sensipar dosing, if needed, based on your blood work which you should try to get done in the next 2 days.

## 2023-01-15 NOTE — CONSULT LETTER
[Dear  ___] : Dear  [unfilled], [Consult Letter:] : I had the pleasure of evaluating your patient, [unfilled]. [Please see my note below.] : Please see my note below. [Consult Closing:] : Thank you very much for allowing me to participate in the care of this patient.  If you have any questions, please do not hesitate to contact me. [Sincerely,] : Sincerely, [FreeTextEntry2] : Dr. Hebert Espinosa, Dr. Ron Mahajan [FreeTextEntry3] : Low Lundy MD, FACS\par System Director, Endocrine Surgery\par [facility]\par Associate  Professor of Surgery\par [employer] [facility]  [DrRadha  ___] : Dr. GOMEZ

## 2023-01-15 NOTE — ASSESSMENT
[FreeTextEntry1] : lengthy discussion regarding options for management. in view of labs and symptoms have recommended minimally invasive parathyroidectomy with PTH assay.  will require preop 4D MRI (no CT due to elevated creatinine).  risks, benefits and alternatives discussed at length.  I have discussed with the patient the anatomy of the area, the pathophysiology of the disease process and the rationale for surgery.  The attendant risks, possible complications and expected postoperative course have been discussed in detail.  I have given the patient the opportunity to ask questions, and all questions have been answered to the patient's satisfaction, and they wish to proceed with the planned procedure.  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 bloods drawn. to call next week for results. . subsequently  (after visit)  d/w dr Magali Sheridan and sent pt to ER For treatment with bisphosphonate to lower calcium level.

## 2023-01-15 NOTE — DISCHARGE NOTE PROVIDER - CARE PROVIDER_API CALL
Low Lundy)  Plastic Surgery  [address] [place] [address] [address]  [place] [address]  Phone: [phone]  Fax: [phone]  Established Patient  Follow Up Time:    Low Lundy)  Plastic Surgery  [address], [address]  [place] [address]  Phone: [phone]  Fax: [phone]  Established Patient  Follow Up Time:     Your, Primary Doctor  Phone: (   )    -  Fax: (   )    -  Follow Up Time: 1 week    Ching Ledesma  ENDOCRINOLOGY/METAB/DIABETES  [address], [address]  [place] [address]  Phone: [phone]  Fax: [phone]  Follow Up Time: 1 week

## 2023-01-15 NOTE — PROGRESS NOTE ADULT - PROBLEM SELECTOR PLAN 1
On arrival w/ albumin corrected ca of 14.1, PTH of 476. VitD labs wnl. Recent [place] admission (discharged 6 days ago), s/p calcitonin and fluids there. Parathyroid US then showed 2cm nodule that may be parathyroid in nature. Had originally been planned for MRI/sestamibi(?) scan w/ Dr. Lundy though was sent to ER instead.  - s/p calcitonin x1 and pamidronate in ED; effects of bisphosphonate will start at day 3 and persist over ~2 weeks  - surgery consulted, no plan for acute surgical intervention  - per endo note, will f/u with Dr. Lundy and team re surgical planning (may pursue inpatient?)  - Ca improving, now 11 though iCa still high at 1.55  - will continue NS @ 100cc/hr given iCa  - continue cinecalcet 30mg BID  - monitor tele, EKG and BMP (Ca/albumin) daily  - check MRI neck soft tissue w/ and w/o contrast  - family requesting call if more calcitonin to be given (worried that multiple dosages have already been given)

## 2023-01-15 NOTE — PROGRESS NOTE ADULT - ATTENDING COMMENTS
77M w/ hx HTN, HLD, prostate ca (no tx), OCD, hyperthyroidism, CKD, recently hospitalized 1wk ago for hypercalcemia presents back to the ER from Dr. Lundy's office for hypercalcemia.    ***Pt has separate MRN at [facility] recently discharged 6 days ago.  Patient has hyperparathyroidism related Hypercalcemia   Ca2+ improving   Telemetry- SR w/ PVC   Pending MRI per endo surgery   F/w endo recs   Correct electrolytes , f/w labs in am

## 2023-01-15 NOTE — PROGRESS NOTE ADULT - PROBLEM SELECTOR PLAN 6
DVT ppx: subQ heparin  Diet: DASH/TLC  Dispo: pending MRI neck , no skilled PT needs    Code Status: FULL CODE

## 2023-01-16 LAB
ALBUMIN SERPL ELPH-MCNC: 3.4 G/DL — SIGNIFICANT CHANGE UP (ref 3.3–5)
ALP SERPL-CCNC: 51 U/L — SIGNIFICANT CHANGE UP (ref 40–120)
ALT FLD-CCNC: 18 U/L — SIGNIFICANT CHANGE UP (ref 4–41)
ANION GAP SERPL CALC-SCNC: 9 MMOL/L — SIGNIFICANT CHANGE UP (ref 7–14)
AST SERPL-CCNC: 16 U/L — SIGNIFICANT CHANGE UP (ref 4–40)
BASOPHILS # BLD AUTO: 0.02 K/UL — SIGNIFICANT CHANGE UP (ref 0–0.2)
BASOPHILS NFR BLD AUTO: 0.3 % — SIGNIFICANT CHANGE UP (ref 0–2)
BILIRUB SERPL-MCNC: 0.3 MG/DL — SIGNIFICANT CHANGE UP (ref 0.2–1.2)
BUN SERPL-MCNC: 29 MG/DL — HIGH (ref 7–23)
CA-I BLD-SCNC: 1.29 MMOL/L — SIGNIFICANT CHANGE UP (ref 1.15–1.29)
CALCIUM SERPL-MCNC: 9 MG/DL — SIGNIFICANT CHANGE UP (ref 8.4–10.5)
CHLORIDE SERPL-SCNC: 112 MMOL/L — HIGH (ref 98–107)
CO2 SERPL-SCNC: 22 MMOL/L — SIGNIFICANT CHANGE UP (ref 22–31)
CREAT SERPL-MCNC: 2.11 MG/DL — HIGH (ref 0.5–1.3)
EGFR: 32 ML/MIN/1.73M2 — LOW
EOSINOPHIL # BLD AUTO: 0.25 K/UL — SIGNIFICANT CHANGE UP (ref 0–0.5)
EOSINOPHIL NFR BLD AUTO: 4.1 % — SIGNIFICANT CHANGE UP (ref 0–6)
GLUCOSE SERPL-MCNC: 90 MG/DL — SIGNIFICANT CHANGE UP (ref 70–99)
HCT VFR BLD CALC: 28.3 % — LOW (ref 39–50)
HGB BLD-MCNC: 9.1 G/DL — LOW (ref 13–17)
IANC: 3.71 K/UL — SIGNIFICANT CHANGE UP (ref 1.8–7.4)
IMM GRANULOCYTES NFR BLD AUTO: 0.2 % — SIGNIFICANT CHANGE UP (ref 0–0.9)
LYMPHOCYTES # BLD AUTO: 1.52 K/UL — SIGNIFICANT CHANGE UP (ref 1–3.3)
LYMPHOCYTES # BLD AUTO: 25 % — SIGNIFICANT CHANGE UP (ref 13–44)
MAGNESIUM SERPL-MCNC: 1.4 MG/DL — LOW (ref 1.6–2.6)
MCHC RBC-ENTMCNC: 27.4 PG — SIGNIFICANT CHANGE UP (ref 27–34)
MCHC RBC-ENTMCNC: 32.2 GM/DL — SIGNIFICANT CHANGE UP (ref 32–36)
MCV RBC AUTO: 85.2 FL — SIGNIFICANT CHANGE UP (ref 80–100)
MONOCYTES # BLD AUTO: 0.56 K/UL — SIGNIFICANT CHANGE UP (ref 0–0.9)
MONOCYTES NFR BLD AUTO: 9.2 % — SIGNIFICANT CHANGE UP (ref 2–14)
NEUTROPHILS # BLD AUTO: 3.71 K/UL — SIGNIFICANT CHANGE UP (ref 1.8–7.4)
NEUTROPHILS NFR BLD AUTO: 61.2 % — SIGNIFICANT CHANGE UP (ref 43–77)
NRBC # BLD: 0 /100 WBCS — SIGNIFICANT CHANGE UP (ref 0–0)
NRBC # FLD: 0 K/UL — SIGNIFICANT CHANGE UP (ref 0–0)
PHOSPHATE SERPL-MCNC: 2.1 MG/DL — LOW (ref 2.5–4.5)
PLATELET # BLD AUTO: 195 K/UL — SIGNIFICANT CHANGE UP (ref 150–400)
POTASSIUM SERPL-MCNC: 3.3 MMOL/L — LOW (ref 3.5–5.3)
POTASSIUM SERPL-SCNC: 3.3 MMOL/L — LOW (ref 3.5–5.3)
PROT SERPL-MCNC: 5.4 G/DL — LOW (ref 6–8.3)
RBC # BLD: 3.32 M/UL — LOW (ref 4.2–5.8)
RBC # FLD: 15 % — HIGH (ref 10.3–14.5)
SODIUM SERPL-SCNC: 143 MMOL/L — SIGNIFICANT CHANGE UP (ref 135–145)
WBC # BLD: 6.07 K/UL — SIGNIFICANT CHANGE UP (ref 3.8–10.5)
WBC # FLD AUTO: 6.07 K/UL — SIGNIFICANT CHANGE UP (ref 3.8–10.5)

## 2023-01-16 PROCEDURE — 70543 MRI ORBT/FAC/NCK W/O &W/DYE: CPT | Mod: 26

## 2023-01-16 PROCEDURE — 99232 SBSQ HOSP IP/OBS MODERATE 35: CPT

## 2023-01-16 RX ORDER — POTASSIUM CHLORIDE 20 MEQ
20 PACKET (EA) ORAL ONCE
Refills: 0 | Status: COMPLETED | OUTPATIENT
Start: 2023-01-16 | End: 2023-01-16

## 2023-01-16 RX ORDER — MAGNESIUM SULFATE 500 MG/ML
2 VIAL (ML) INJECTION ONCE
Refills: 0 | Status: COMPLETED | OUTPATIENT
Start: 2023-01-16 | End: 2023-01-16

## 2023-01-16 RX ORDER — POTASSIUM PHOSPHATE, MONOBASIC POTASSIUM PHOSPHATE, DIBASIC 236; 224 MG/ML; MG/ML
15 INJECTION, SOLUTION INTRAVENOUS ONCE
Refills: 0 | Status: COMPLETED | OUTPATIENT
Start: 2023-01-16 | End: 2023-01-16

## 2023-01-16 RX ADMIN — HEPARIN SODIUM 5000 UNIT(S): 5000 INJECTION INTRAVENOUS; SUBCUTANEOUS at 17:03

## 2023-01-16 RX ADMIN — Medication 20 MILLIEQUIVALENT(S): at 12:51

## 2023-01-16 RX ADMIN — Medication 50 MILLIGRAM(S): at 11:15

## 2023-01-16 RX ADMIN — Medication 25 GRAM(S): at 11:27

## 2023-01-16 RX ADMIN — Medication 4 MILLIGRAM(S): at 21:09

## 2023-01-16 RX ADMIN — ATORVASTATIN CALCIUM 10 MILLIGRAM(S): 80 TABLET, FILM COATED ORAL at 21:09

## 2023-01-16 RX ADMIN — PANTOPRAZOLE SODIUM 40 MILLIGRAM(S): 20 TABLET, DELAYED RELEASE ORAL at 05:00

## 2023-01-16 RX ADMIN — HEPARIN SODIUM 5000 UNIT(S): 5000 INJECTION INTRAVENOUS; SUBCUTANEOUS at 05:01

## 2023-01-16 RX ADMIN — Medication 1 GRAM(S): at 08:40

## 2023-01-16 RX ADMIN — CINACALCET 30 MILLIGRAM(S): 30 TABLET, FILM COATED ORAL at 05:01

## 2023-01-16 RX ADMIN — POTASSIUM PHOSPHATE, MONOBASIC POTASSIUM PHOSPHATE, DIBASIC 62.5 MILLIMOLE(S): 236; 224 INJECTION, SOLUTION INTRAVENOUS at 13:43

## 2023-01-16 RX ADMIN — CINACALCET 30 MILLIGRAM(S): 30 TABLET, FILM COATED ORAL at 17:04

## 2023-01-16 RX ADMIN — AMLODIPINE BESYLATE 10 MILLIGRAM(S): 2.5 TABLET ORAL at 05:01

## 2023-01-16 NOTE — PROGRESS NOTE ADULT - ASSESSMENT
· Assessment	  77M w/ hx HTN, HLD, prostate ca (no tx), OCD, hyperthyroidism, CKD, recently hospitalized 1wk ago for hypercalcemia presents back to the ER from Dr. Lundy's office for hypercalcemia.    ***Pt has separate MRN at [facility] recently discharged 6 days ago.     Problem/Plan - 1:  ·  Problem: Hypercalcemia.   ·  Plan: On arrival w/ albumin corrected ca of 14.1, PTH of 476. VitD labs wnl. Recent [place] admission (discharged 6 days ago), s/p calcitonin and fluids there. Parathyroid US then showed 2cm nodule that may be parathyroid in nature. Had originally been planned for MRI/sestamibi(?) scan w/ Dr. Lundy though was sent to ER instead.  - s/p calcitonin x1 and pamidronate in ED; effects of bisphosphonate will start at day 3 and persist over ~2 weeks  - surgery consulted, no plan for acute surgical intervention  - per endo note, will f/u with Dr. Lundy and team re surgical planning (may pursue inpatient?)  - Ca improving, now 11 though iCa still high at 1.55  - will continue NS @ 100cc/hr given iCa  - continue cinecalcet 30mg BID  - monitor tele, EKG and BMP (Ca/albumin) daily  - check MRI neck soft tissue w/ and w/o contrast  - family requesting call if more calcitonin to be given (worried that multiple dosages have already been given).     Problem/Plan - 2:  ·  Problem: History of hyperthyroidism.   ·  Plan: TSH 0.4, Free T4 1.4. Continue methimazole 2.5mg daily.     Problem/Plan - 3:  ·  Problem: Chronic kidney disease, unspecified CKD stage.   ·  Plan: Pt w/ new CKD on last admission, Cr stable at ~2.2. Cr around 2.0 from last admission, replete lytes.     Problem/Plan - 4:  ·  Problem: OCD (obsessive compulsive disorder).   ·  Plan: Continue home paroxetine 50mg QD.     Problem/Plan - 5:  ·  Problem: HTN (hypertension).   ·  Plan: #Hyperlipidemia  Continue home amlodipine 10mg QD, cardura 4mg QD, atorvastatin 10mg QHS.     Problem/Plan - 6:  ·  Problem: Need for prophylactic measure.   ·  Plan: DVT ppx: subQ heparin  Diet: DASH/TLC  Dispo: pending MRI neck , no skilled PT needs

## 2023-01-16 NOTE — CHART NOTE - NSCHARTNOTEFT_GEN_A_CORE
Calcium continues to improve. Continue to monitor closely in case patient were to develop hypocalcemia. MR neck still pending.    Saul Napoles DO, Endocrinology Fellow  For follow-up questions, discharge recommendations, or new consults please call answering service at [phone] (weekdays), [phone] (nights/weekends). For nonurgent matters, please email [email or web address] or [email or web address].

## 2023-01-17 ENCOUNTER — APPOINTMENT (OUTPATIENT)
Dept: MRI IMAGING | Facility: CLINIC | Age: 78
End: 2023-01-17

## 2023-01-17 LAB
ALBUMIN SERPL ELPH-MCNC: 3.5 G/DL — SIGNIFICANT CHANGE UP (ref 3.3–5)
ALP SERPL-CCNC: 61 U/L — SIGNIFICANT CHANGE UP (ref 40–120)
ALT FLD-CCNC: 23 U/L — SIGNIFICANT CHANGE UP (ref 4–41)
ANION GAP SERPL CALC-SCNC: 10 MMOL/L — SIGNIFICANT CHANGE UP (ref 7–14)
AST SERPL-CCNC: 19 U/L — SIGNIFICANT CHANGE UP (ref 4–40)
BASOPHILS # BLD AUTO: 0.04 K/UL — SIGNIFICANT CHANGE UP (ref 0–0.2)
BASOPHILS NFR BLD AUTO: 0.6 % — SIGNIFICANT CHANGE UP (ref 0–2)
BILIRUB SERPL-MCNC: 0.3 MG/DL — SIGNIFICANT CHANGE UP (ref 0.2–1.2)
BUN SERPL-MCNC: 25 MG/DL — HIGH (ref 7–23)
CA-I BLD-SCNC: 1.21 MMOL/L — SIGNIFICANT CHANGE UP (ref 1.15–1.29)
CALCIUM SERPL-MCNC: 9.1 MG/DL — SIGNIFICANT CHANGE UP (ref 8.4–10.5)
CHLORIDE SERPL-SCNC: 111 MMOL/L — HIGH (ref 98–107)
CO2 SERPL-SCNC: 22 MMOL/L — SIGNIFICANT CHANGE UP (ref 22–31)
CREAT SERPL-MCNC: 1.97 MG/DL — HIGH (ref 0.5–1.3)
EGFR: 35 ML/MIN/1.73M2 — LOW
EOSINOPHIL # BLD AUTO: 0.24 K/UL — SIGNIFICANT CHANGE UP (ref 0–0.5)
EOSINOPHIL NFR BLD AUTO: 3.6 % — SIGNIFICANT CHANGE UP (ref 0–6)
GLUCOSE SERPL-MCNC: 88 MG/DL — SIGNIFICANT CHANGE UP (ref 70–99)
HCT VFR BLD CALC: 30.9 % — LOW (ref 39–50)
HGB BLD-MCNC: 9.9 G/DL — LOW (ref 13–17)
IANC: 4.41 K/UL — SIGNIFICANT CHANGE UP (ref 1.8–7.4)
IMM GRANULOCYTES NFR BLD AUTO: 0.6 % — SIGNIFICANT CHANGE UP (ref 0–0.9)
LYMPHOCYTES # BLD AUTO: 1.34 K/UL — SIGNIFICANT CHANGE UP (ref 1–3.3)
LYMPHOCYTES # BLD AUTO: 20 % — SIGNIFICANT CHANGE UP (ref 13–44)
MAGNESIUM SERPL-MCNC: 1.8 MG/DL — SIGNIFICANT CHANGE UP (ref 1.6–2.6)
MCHC RBC-ENTMCNC: 27.2 PG — SIGNIFICANT CHANGE UP (ref 27–34)
MCHC RBC-ENTMCNC: 32 GM/DL — SIGNIFICANT CHANGE UP (ref 32–36)
MCV RBC AUTO: 84.9 FL — SIGNIFICANT CHANGE UP (ref 80–100)
MONOCYTES # BLD AUTO: 0.63 K/UL — SIGNIFICANT CHANGE UP (ref 0–0.9)
MONOCYTES NFR BLD AUTO: 9.4 % — SIGNIFICANT CHANGE UP (ref 2–14)
NEUTROPHILS # BLD AUTO: 4.41 K/UL — SIGNIFICANT CHANGE UP (ref 1.8–7.4)
NEUTROPHILS NFR BLD AUTO: 65.8 % — SIGNIFICANT CHANGE UP (ref 43–77)
NRBC # BLD: 0 /100 WBCS — SIGNIFICANT CHANGE UP (ref 0–0)
NRBC # FLD: 0 K/UL — SIGNIFICANT CHANGE UP (ref 0–0)
PHOSPHATE SERPL-MCNC: 2.3 MG/DL — LOW (ref 2.5–4.5)
PLATELET # BLD AUTO: 211 K/UL — SIGNIFICANT CHANGE UP (ref 150–400)
POTASSIUM SERPL-MCNC: 3.4 MMOL/L — LOW (ref 3.5–5.3)
POTASSIUM SERPL-SCNC: 3.4 MMOL/L — LOW (ref 3.5–5.3)
PROT SERPL-MCNC: 6 G/DL — SIGNIFICANT CHANGE UP (ref 6–8.3)
RBC # BLD: 3.64 M/UL — LOW (ref 4.2–5.8)
RBC # FLD: 15.1 % — HIGH (ref 10.3–14.5)
SODIUM SERPL-SCNC: 143 MMOL/L — SIGNIFICANT CHANGE UP (ref 135–145)
WBC # BLD: 6.7 K/UL — SIGNIFICANT CHANGE UP (ref 3.8–10.5)
WBC # FLD AUTO: 6.7 K/UL — SIGNIFICANT CHANGE UP (ref 3.8–10.5)

## 2023-01-17 PROCEDURE — 99233 SBSQ HOSP IP/OBS HIGH 50: CPT

## 2023-01-17 PROCEDURE — 99233 SBSQ HOSP IP/OBS HIGH 50: CPT | Mod: GC

## 2023-01-17 RX ORDER — MAGNESIUM OXIDE 400 MG ORAL TABLET 241.3 MG
400 TABLET ORAL
Refills: 0 | Status: DISCONTINUED | OUTPATIENT
Start: 2023-01-17 | End: 2023-01-18

## 2023-01-17 RX ORDER — POTASSIUM CHLORIDE 20 MEQ
40 PACKET (EA) ORAL EVERY 4 HOURS
Refills: 0 | Status: COMPLETED | OUTPATIENT
Start: 2023-01-17 | End: 2023-01-17

## 2023-01-17 RX ADMIN — ATORVASTATIN CALCIUM 10 MILLIGRAM(S): 80 TABLET, FILM COATED ORAL at 21:25

## 2023-01-17 RX ADMIN — HEPARIN SODIUM 5000 UNIT(S): 5000 INJECTION INTRAVENOUS; SUBCUTANEOUS at 05:12

## 2023-01-17 RX ADMIN — Medication 1 GRAM(S): at 09:39

## 2023-01-17 RX ADMIN — MAGNESIUM OXIDE 400 MG ORAL TABLET 400 MILLIGRAM(S): 241.3 TABLET ORAL at 13:03

## 2023-01-17 RX ADMIN — Medication 40 MILLIEQUIVALENT(S): at 13:58

## 2023-01-17 RX ADMIN — AMLODIPINE BESYLATE 10 MILLIGRAM(S): 2.5 TABLET ORAL at 05:12

## 2023-01-17 RX ADMIN — Medication 40 MILLIEQUIVALENT(S): at 09:58

## 2023-01-17 RX ADMIN — MAGNESIUM OXIDE 400 MG ORAL TABLET 400 MILLIGRAM(S): 241.3 TABLET ORAL at 09:58

## 2023-01-17 RX ADMIN — MAGNESIUM OXIDE 400 MG ORAL TABLET 400 MILLIGRAM(S): 241.3 TABLET ORAL at 18:34

## 2023-01-17 RX ADMIN — Medication 50 MILLIGRAM(S): at 11:47

## 2023-01-17 RX ADMIN — HEPARIN SODIUM 5000 UNIT(S): 5000 INJECTION INTRAVENOUS; SUBCUTANEOUS at 17:39

## 2023-01-17 RX ADMIN — Medication 4 MILLIGRAM(S): at 21:25

## 2023-01-17 RX ADMIN — CINACALCET 30 MILLIGRAM(S): 30 TABLET, FILM COATED ORAL at 05:11

## 2023-01-17 RX ADMIN — CINACALCET 30 MILLIGRAM(S): 30 TABLET, FILM COATED ORAL at 17:39

## 2023-01-17 RX ADMIN — PANTOPRAZOLE SODIUM 40 MILLIGRAM(S): 20 TABLET, DELAYED RELEASE ORAL at 05:12

## 2023-01-17 NOTE — PROGRESS NOTE ADULT - ATTENDING COMMENTS
Severe primary hyperparathyroidism (PTH 500s), given rapidity of onset there is also concern for parathyroid carcinoma vs severe primary hyperparathyroidism. Case discussed in detail with Dr. Lundy.  Patient is s/p pamidronate with improvement of calcium to normal range for a short time period-  The lasting effect of calcium control x 2 weeks.  Ideally plan surgery during this time. GFR 30s will trend.  Patient also with sizable thyroid nodules - Dr. Lundy likely to perform thyroid lobectomy at time of parathyroid surgery.  MRI for surgical planning completed.  Patient continues on cinacalcet 30mg BID, not increasing dose now as recently given pamidronate.   If surgery occurs after pamidronate wearing off may raise to higher dose cinacalcet until surgery.  25OHD not low (62.7).  High risk patient high level decision making.    Yumiko Bello MD  Division of Endocrinology  Pager: [phone]    If after 6PM or before 9AM, or on weekends/holidays, please call endocrine answering service for assistance ([phone]).  For nonurgent matters email [email or web address] for assistance.

## 2023-01-17 NOTE — PROGRESS NOTE ADULT - ASSESSMENT
78 y/o male w/ HTN, HLD, Prostate ca (no tx), OCD, hyperthyroidism, CKD and recent hospitalization at [facility] last week for hypercalcemia presents back to the ER from Dr. Lundy's office for severe, symptomatic hypercalcemia. Endocrine consulted for assistance with evaluation/management of hypercalcemia with elevated PTH.     #Severe Symptomatic Hypercalcemia, now improving likely 2/2 Primary Hyperparathyroidism  vs Malignancy? (rule out parathyroid carcinoma) unlikely given PTH is very high & 2cm ?parathyroid lesion noted on US thyroid/parathyroid  Ca on presentation 14.2 , currently improved to 9.5 corrected, s/p Calcitonin x 1 dose & Pamidronate 60mg here 1/12-1/13  PTH here in 400s  Vitamin D, 25-Hydroxy: 62.7 ng/mL (01.13.23 @ 05:40)  Vitamin D, 1,25-Hydroxy: 39.2 pg/mL  egfr 30 - new, also since onset of hypercalcemia ~ 10/2022 per pt   was admitted to [facility] last week, Ca on presentation there 15.9 with PTH as high as 585, s/p Calcitonin x 4 doses & IVF with improvement. Discharged with plans for sestamibi scan as US thyroid/parathyroid performed during that admission revealed 2x1.2x0.8cm lesion along posterior border of L. lower pole thyroid concerning for parathyroid adenoma.   hx of prostate CA, however recent CT A/P without contrast done at [facility] does not make note of any nusrat abnormalities other than degenerative changes & osteopenia (results seen in HIE)   Recommendations:   - encourage po hydration as tolerated   - continue with cinacalcet 30mg bid   - tele monitoring  - trend Ca/albumin at least daily   - consider checking spep/upep/immunofixation given anemia & new renal dysfunction, although less likely given elevated PTH  - Endocrine surgery on board, no acute surgical interventions at this time - will reach out to Dr. Lundy regarding plans for parathyroid surgery - on this admission? Would be ideal since calcium is well controlled and pamidronate will wear off.  - pamidronate's calcium lowering effects can be expected typically 3 days after administration and usually last for approximately 2 weeks  - MRI neck soft tissue with and without contrast (had been ordered by Dr. Lundy outpatient prior to this admission) while inpatient showed diffusely enlarged thyroid gland. Asymmetrically enlarged left thyroid lobe. Left lower pole pole 3.5 cm heterogeneous nodule. Posterior medial to the left thyroid lobe 1.2 cm nodule may be exophytic thyroid versus parathyroid versus other in origin.    #Hyperthyroidism   TSH 0.40, FT4 1.4  - continue home methimazole 2.5mg daily - LFTs & ANC wnl   - outpatient follow up with his private endocrinologist     #Thyroid Nodules   multiple thyroid nodules noted on recent US thyroid/parathyroid performed at [facility] (results in HIE)   reports being monitored/previously bx benign many years ago   US thyroid/parathyroid at [facility] revealed R. 1cm interpolar nodule, 0.6cm cyst, 1.1cm heterogenous nodule and L. 3.8 heterogenous lower pole nodule, 2.1cm heterogenous upper pole nodule along with 2 x 1.2 x 0.8 cm hypoechoic nodule along posterior boarder of L. lower pole thyroid   - outpatient follow up with his private endocrinologist vs inpatient FNA/hemithyroidectomy? Will reach out to Dr. Lundy, Endocrine surgeon for input.     Discussed with Dr. Dede Napoles DO, Endocrinology Fellow  For follow-up questions, discharge recommendations, or new consults please call answering service at [phone] (weekdays), [phone] (nights/weekends). For nonurgent matters, please email [email or web address] or [email or web address].   76 y/o male w/ HTN, HLD, Prostate ca (no tx), OCD, hyperthyroidism, CKD and recent hospitalization at [facility] last week for hypercalcemia presents back to the ER from Dr. Lundy's office for severe, symptomatic hypercalcemia. Endocrine consulted for assistance with evaluation/management of hypercalcemia with elevated PTH.     #Severe Symptomatic Hypercalcemia, now improving likely 2/2 Primary Hyperparathyroidism  vs Malignancy? (rule out parathyroid carcinoma) unlikely given PTH is very high & 2cm ?parathyroid lesion noted on US thyroid/parathyroid  Ca on presentation 14.2 , currently improved to 9.5 corrected, s/p Calcitonin x 1 dose & Pamidronate 60mg here 1/12-1/13  PTH here in 400s  Vitamin D, 25-Hydroxy: 62.7 ng/mL (01.13.23 @ 05:40)  Vitamin D, 1,25-Hydroxy: 39.2 pg/mL  egfr 30 - new, also since onset of hypercalcemia ~ 10/2022 per pt   was admitted to [facility] last week, Ca on presentation there 15.9 with PTH as high as 585, s/p Calcitonin x 4 doses & IVF with improvement. Discharged with plans for sestamibi scan as US thyroid/parathyroid performed during that admission revealed 2x1.2x0.8cm lesion along posterior border of L. lower pole thyroid concerning for parathyroid adenoma.   hx of prostate CA, however recent CT A/P without contrast done at [facility] does not make note of any nusrat abnormalities other than degenerative changes & osteopenia (results seen in HIE)   Recommendations:   - encourage po hydration as tolerated   - continue with cinacalcet 30mg bid   - tele monitoring  - trend Ca/albumin at least daily   - consider checking spep/upep/immunofixation given anemia & new renal dysfunction, although less likely given elevated PTH  - Endocrine surgery on board, no acute surgical interventions at this time - will reach out to Dr. Lundy regarding plans for parathyroid surgery - on this admission? Would be ideal since calcium is well controlled and pamidronate will wear off.  - pamidronate's calcium lowering effects can be expected typically 3 days after administration and usually last for approximately 2 weeks  - MRI neck soft tissue with and without contrast (had been ordered by Dr. Lundy outpatient prior to this admission) while inpatient showed diffusely enlarged thyroid gland. Asymmetrically enlarged left thyroid lobe. Left lower pole pole 3.5 cm heterogeneous nodule. Posterior medial to the left thyroid lobe 1.2 cm nodule may be exophytic thyroid versus parathyroid versus other in origin.  -Patient wishes to f/u outpatient with Endocrinology faculty practice [facility]: Endocrinology at [place].   [address] [place] [address], [address]  [place] [address]  Phone: [phone]  Fax: [phone]    #Hyperthyroidism   TSH 0.40, FT4 1.4  - continue home methimazole 2.5mg daily - LFTs & ANC wnl   - outpatient follow up with his private endocrinologist     #Thyroid Nodules   multiple thyroid nodules noted on recent US thyroid/parathyroid performed at [facility] (results in HIE)   reports being monitored/previously bx benign many years ago   US thyroid/parathyroid at [facility] revealed R. 1cm interpolar nodule, 0.6cm cyst, 1.1cm heterogenous nodule and L. 3.8 heterogenous lower pole nodule, 2.1cm heterogenous upper pole nodule along with 2 x 1.2 x 0.8 cm hypoechoic nodule along posterior boarder of L. lower pole thyroid   - outpatient follow up with his private endocrinologist vs inpatient FNA/hemithyroidectomy? Will reach out to Dr. Lundy, Endocrine surgeon for input.     Discussed with Dr. Dede Napoles DO, Endocrinology Fellow  For follow-up questions, discharge recommendations, or new consults please call answering service at [phone] (weekdays), [phone] (nights/weekends). For nonurgent matters, please email [email or web address] or [email or web address].

## 2023-01-17 NOTE — PROGRESS NOTE ADULT - ASSESSMENT
77M with hx of prostate cancer (no tx), hyperthyroidism, CKD, HTN, HLD, recently admitted to [facility] for hypercalcemia (found to have parathyroid nodule), admitted with symptomatic hypercalcemia d/t hyperparathyroidism.

## 2023-01-17 NOTE — PROGRESS NOTE ADULT - ASSESSMENT
77M presenting with symptomatic hypercalcemia, calcium now normalized.     Recommendations:  - No acute surgical intervention at this time  - Endocrine recommendations noted   - Patient can follow up outpatient with Dr. Nikkie BARRERA Team Surgery  [id number]

## 2023-01-17 NOTE — PROGRESS NOTE ADULT - PROBLEM SELECTOR PLAN 3
SCr stable ~2  - dx with new CKD on last admission  - monitor electrolytes  - outpatient nephrology followup

## 2023-01-17 NOTE — PROGRESS NOTE ADULT - PROBLEM SELECTOR PLAN 1
Resolved (on admission albumin corrected calcium 14.1)  - likely d/t hyperparathyroidism ()  - recent admission at [facility] (US showed 2cm parathyroid nodule), originally planned for MRI/sestamibi scan outpatient, surgeon Dr. Lundy rec inpatient admission  - MRI neck soft tissue done (pending read)  - appreciate surgery recommendations: no plans for surgical intervention   - appreciate endocrine recommendations: s/p calcitonin, pamidronate, c/w cinacalcet   - monitor tele, EKG and BMP (Ca/albumin) daily  - family requesting call if more calcitonin to be given (worried that multiple dosages have already been given)

## 2023-01-18 ENCOUNTER — TRANSCRIPTION ENCOUNTER (OUTPATIENT)
Age: 78
End: 2023-01-18

## 2023-01-18 VITALS
OXYGEN SATURATION: 96 % | SYSTOLIC BLOOD PRESSURE: 147 MMHG | HEART RATE: 100 BPM | DIASTOLIC BLOOD PRESSURE: 86 MMHG | TEMPERATURE: 98 F | RESPIRATION RATE: 17 BRPM

## 2023-01-18 PROBLEM — N18.9 CHRONIC KIDNEY DISEASE, UNSPECIFIED: Chronic | Status: ACTIVE | Noted: 2023-01-13

## 2023-01-18 PROBLEM — F42.9 OBSESSIVE-COMPULSIVE DISORDER, UNSPECIFIED: Chronic | Status: ACTIVE | Noted: 2023-01-13

## 2023-01-18 PROBLEM — E78.5 HYPERLIPIDEMIA, UNSPECIFIED: Chronic | Status: ACTIVE | Noted: 2023-01-13

## 2023-01-18 PROBLEM — I10 ESSENTIAL (PRIMARY) HYPERTENSION: Chronic | Status: ACTIVE | Noted: 2023-01-12

## 2023-01-18 PROBLEM — C61 MALIGNANT NEOPLASM OF PROSTATE: Chronic | Status: ACTIVE | Noted: 2023-01-13

## 2023-01-18 PROBLEM — Z86.39 PERSONAL HISTORY OF OTHER ENDOCRINE, NUTRITIONAL AND METABOLIC DISEASE: Chronic | Status: ACTIVE | Noted: 2023-01-13

## 2023-01-18 LAB
ANION GAP SERPL CALC-SCNC: 8 MMOL/L — SIGNIFICANT CHANGE UP (ref 7–14)
BUN SERPL-MCNC: 27 MG/DL — HIGH (ref 7–23)
CA-I BLD-SCNC: 1.25 MMOL/L — SIGNIFICANT CHANGE UP (ref 1.15–1.29)
CALCIUM SERPL-MCNC: 9.2 MG/DL — SIGNIFICANT CHANGE UP (ref 8.4–10.5)
CHLORIDE SERPL-SCNC: 110 MMOL/L — HIGH (ref 98–107)
CO2 SERPL-SCNC: 23 MMOL/L — SIGNIFICANT CHANGE UP (ref 22–31)
CREAT SERPL-MCNC: 1.98 MG/DL — HIGH (ref 0.5–1.3)
EGFR: 35 ML/MIN/1.73M2 — LOW
GLUCOSE SERPL-MCNC: 87 MG/DL — SIGNIFICANT CHANGE UP (ref 70–99)
MAGNESIUM SERPL-MCNC: 1.5 MG/DL — LOW (ref 1.6–2.6)
PHOSPHATE SERPL-MCNC: 1.5 MG/DL — LOW (ref 2.5–4.5)
POTASSIUM SERPL-MCNC: 3.9 MMOL/L — SIGNIFICANT CHANGE UP (ref 3.5–5.3)
POTASSIUM SERPL-SCNC: 3.9 MMOL/L — SIGNIFICANT CHANGE UP (ref 3.5–5.3)
SODIUM SERPL-SCNC: 141 MMOL/L — SIGNIFICANT CHANGE UP (ref 135–145)

## 2023-01-18 PROCEDURE — 99233 SBSQ HOSP IP/OBS HIGH 50: CPT | Mod: GC

## 2023-01-18 PROCEDURE — 99239 HOSP IP/OBS DSCHRG MGMT >30: CPT

## 2023-01-18 RX ORDER — AMLODIPINE BESYLATE 2.5 MG/1
1 TABLET ORAL
Qty: 0 | Refills: 0 | DISCHARGE

## 2023-01-18 RX ORDER — POTASSIUM PHOSPHATE, MONOBASIC POTASSIUM PHOSPHATE, DIBASIC 236; 224 MG/ML; MG/ML
15 INJECTION, SOLUTION INTRAVENOUS ONCE
Refills: 0 | Status: COMPLETED | OUTPATIENT
Start: 2023-01-18 | End: 2023-01-18

## 2023-01-18 RX ORDER — CINACALCET 30 MG/1
1 TABLET, FILM COATED ORAL
Qty: 0 | Refills: 0 | DISCHARGE

## 2023-01-18 RX ORDER — SUCRALFATE 1 G
10 TABLET ORAL
Qty: 0 | Refills: 0 | DISCHARGE

## 2023-01-18 RX ORDER — CHOLECALCIFEROL (VITAMIN D3) 125 MCG
1 CAPSULE ORAL
Qty: 0 | Refills: 0 | DISCHARGE

## 2023-01-18 RX ORDER — TERAZOSIN HYDROCHLORIDE 10 MG/1
1 CAPSULE ORAL
Qty: 0 | Refills: 0 | DISCHARGE

## 2023-01-18 RX ORDER — METHIMAZOLE 10 MG/1
0.5 TABLET ORAL
Qty: 0 | Refills: 0 | DISCHARGE

## 2023-01-18 RX ORDER — OMEPRAZOLE 10 MG/1
1 CAPSULE, DELAYED RELEASE ORAL
Qty: 0 | Refills: 0 | DISCHARGE

## 2023-01-18 RX ORDER — MAGNESIUM SULFATE 500 MG/ML
2 VIAL (ML) INJECTION ONCE
Refills: 0 | Status: COMPLETED | OUTPATIENT
Start: 2023-01-18 | End: 2023-01-18

## 2023-01-18 RX ORDER — ASPIRIN/CALCIUM CARB/MAGNESIUM 324 MG
1 TABLET ORAL
Qty: 0 | Refills: 0 | DISCHARGE

## 2023-01-18 RX ADMIN — CINACALCET 30 MILLIGRAM(S): 30 TABLET, FILM COATED ORAL at 17:53

## 2023-01-18 RX ADMIN — POTASSIUM PHOSPHATE, MONOBASIC POTASSIUM PHOSPHATE, DIBASIC 62.5 MILLIMOLE(S): 236; 224 INJECTION, SOLUTION INTRAVENOUS at 10:26

## 2023-01-18 RX ADMIN — Medication 1 GRAM(S): at 10:27

## 2023-01-18 RX ADMIN — CINACALCET 30 MILLIGRAM(S): 30 TABLET, FILM COATED ORAL at 05:04

## 2023-01-18 RX ADMIN — HEPARIN SODIUM 5000 UNIT(S): 5000 INJECTION INTRAVENOUS; SUBCUTANEOUS at 05:07

## 2023-01-18 RX ADMIN — Medication 25 GRAM(S): at 10:27

## 2023-01-18 RX ADMIN — AMLODIPINE BESYLATE 10 MILLIGRAM(S): 2.5 TABLET ORAL at 05:04

## 2023-01-18 RX ADMIN — MAGNESIUM OXIDE 400 MG ORAL TABLET 400 MILLIGRAM(S): 241.3 TABLET ORAL at 13:18

## 2023-01-18 RX ADMIN — MAGNESIUM OXIDE 400 MG ORAL TABLET 400 MILLIGRAM(S): 241.3 TABLET ORAL at 10:29

## 2023-01-18 RX ADMIN — Medication 50 MILLIGRAM(S): at 13:18

## 2023-01-18 RX ADMIN — PANTOPRAZOLE SODIUM 40 MILLIGRAM(S): 20 TABLET, DELAYED RELEASE ORAL at 05:04

## 2023-01-18 NOTE — PROGRESS NOTE ADULT - PROBLEM SELECTOR PLAN 1
Resolved (on admission albumin corrected calcium 14.1)  - likely d/t hyperparathyroidism ()  - recent admission at [facility] (US showed 2cm parathyroid nodule), originally planned for MRI/sestamibi scan outpatient, surgeon Dr. Lundy rec inpatient admission  - MRI neck soft tissue done - d/w endo/surg - recommending outpatient followup  - appreciate surgery recommendations: no plans for inpatient surgical intervention,   - appreciate endocrine recommendations: s/p calcitonin, pamidronate, c/w cinacalcet   - monitor tele, EKG and BMP (Ca/albumin) daily

## 2023-01-18 NOTE — DISCHARGE NOTE NURSING/CASE MANAGEMENT/SOCIAL WORK - NSDCPEFALRISK_GEN_ALL_CORE
For information on Fall & Injury Prevention, visit: [email or web address] OR  [email or web address] OR  https://www.cdc.gov/steadi/patient.html

## 2023-01-18 NOTE — PROGRESS NOTE ADULT - ASSESSMENT
78 y/o male w/ HTN, HLD, Prostate ca (no tx), OCD, hyperthyroidism, CKD and recent hospitalization at [place] last week for hypercalcemia presents back to the ER from Dr. Lundy's office for severe, symptomatic hypercalcemia. Endocrine consulted for assistance with evaluation/management of hypercalcemia with elevated PTH.     #Severe Symptomatic Hypercalcemia, now improving likely 2/2 Primary Hyperparathyroidism  vs Malignancy? (rule out parathyroid carcinoma) unlikely given PTH is very high & 2cm ?parathyroid lesion noted on US thyroid/parathyroid  Ca on presentation 14.2 , currently improved to 9.5 corrected, s/p Calcitonin x 1 dose & Pamidronate 60mg here 1/12-1/13  PTH here in 400s  Vitamin D, 25-Hydroxy: 62.7 ng/mL (01.13.23 @ 05:40)  Vitamin D, 1,25-Hydroxy: 39.2 pg/mL  egfr 30 - new, also since onset of hypercalcemia ~ 10/2022 per pt   was admitted to [facility] last week, Ca on presentation there 15.9 with PTH as high as 585, s/p Calcitonin x 4 doses & IVF with improvement. Discharged with plans for sestamibi scan as US thyroid/parathyroid performed during that admission revealed 2x1.2x0.8cm lesion along posterior border of L. lower pole thyroid concerning for parathyroid adenoma.   hx of prostate CA, however recent CT A/P without contrast done at [facility] does not make note of any nusrat abnormalities other than degenerative changes & osteopenia (results seen in HIE)   Recommendations:   - encourage po hydration as tolerated   - continue with cinacalcet 30mg bid. Repeat calcium level in 5-7 days as outpatient and if trending up, can increase cinacalcet to 60mg BID. Pamidronate's calcium lowering effects can be expected typically 3 days after administration and usually last for approximately 2 weeks. Patient should see the endocrinologist with whom he is already following for labs and management. Wishes to transition care to our endocrine faculty practice, contact information below.  - tele monitoring  - trend Ca/albumin at least daily   - consider checking spep/upep/immunofixation given anemia & new renal dysfunction, although less likely given elevated PTH  - Endocrine surgery on board, no acute surgical interventions at this time. MRI neck done yesterday (see below) but may be limited for surgical pre-op planning purposes. Dr. Lundy will speak with radiologists regarding imaging and patient may need to get another MRI with 4D imaging. Has an appointment for such an MRI tomorrow so is interested in being discharged if nothing else is being actively managed here. Dr. Lundy told our team that the patient can be discharged now and further planning as outpatient. From an Endocrine perspective patient can be discharged home.   - MRI neck soft tissue with and without contrast (had been ordered by Dr. Lundy outpatient prior to this admission) while inpatient showed diffusely enlarged thyroid gland. Asymmetrically enlarged left thyroid lobe. Left lower pole pole 3.5 cm heterogeneous nodule. Posterior medial to the left thyroid lobe 1.2 cm nodule may be exophytic thyroid versus parathyroid versus other in origin.  -Patient wishes to f/u outpatient with Endocrinology faculty practice [facility]: Endocrinology at [place].   [address] [place] [address], [address]  [place] [address]  Phone: [phone]  Fax: [phone]    #Hyperthyroidism   TSH 0.40, FT4 1.4  - continue home methimazole 2.5mg daily - LFTs & ANC wnl   - outpatient follow up with his private endocrinologist     #Thyroid Nodules   multiple thyroid nodules noted on recent US thyroid/parathyroid performed at [facility] (results in HIE)   reports being monitored/previously bx benign many years ago   US thyroid/parathyroid at [facility] revealed R. 1cm interpolar nodule, 0.6cm cyst, 1.1cm heterogenous nodule and L. 3.8 heterogenous lower pole nodule, 2.1cm heterogenous upper pole nodule along with 2 x 1.2 x 0.8 cm hypoechoic nodule along posterior boarder of L. lower pole thyroid   - outpatient follow up with his private endocrinologist and head/neck surgeon    Discussed with Dr. Massimo Napoles DO, Endocrinology Fellow  For follow-up questions, discharge recommendations, or new consults please call answering service at [phone] (weekdays), [phone] (nights/weekends). For nonurgent matters, please email [email or web address] or [email or web address].   76 y/o male w/ HTN, HLD, Prostate ca (no tx), OCD, hyperthyroidism, CKD and recent hospitalization at [facility] last week for hypercalcemia presents back to the ER from Dr. Lundy's office for severe, symptomatic hypercalcemia. Endocrine consulted for assistance with evaluation/management of hypercalcemia with elevated PTH.     #Severe Symptomatic Hypercalcemia, now improving likely 2/2 Primary Hyperparathyroidism  vs Malignancy? (rule out parathyroid carcinoma) unlikely given PTH is very high & 2cm ?parathyroid lesion noted on US thyroid/parathyroid  Ca on presentation 14.2 , currently improved to 9.5 corrected, s/p Calcitonin x 1 dose & Pamidronate 60mg here 1/12-1/13  PTH here in 400s  Vitamin D, 25-Hydroxy: 62.7 ng/mL (01.13.23 @ 05:40)  Vitamin D, 1,25-Hydroxy: 39.2 pg/mL  egfr 30 - new, also since onset of hypercalcemia ~ 10/2022 per pt   was admitted to [facility] last week, Ca on presentation there 15.9 with PTH as high as 585, s/p Calcitonin x 4 doses & IVF with improvement. Discharged with plans for sestamibi scan as US thyroid/parathyroid performed during that admission revealed 2x1.2x0.8cm lesion along posterior border of L. lower pole thyroid concerning for parathyroid adenoma.   hx of prostate CA, however recent CT A/P without contrast done at [facility] does not make note of any nusrat abnormalities other than degenerative changes & osteopenia (results seen in HIE)   Recommendations:   - encourage po hydration as tolerated   - continue with cinacalcet 30mg bid. Repeat calcium level in 5-7 days as outpatient and if trending up, can increase cinacalcet to 60mg BID. Pamidronate's calcium lowering effects can be expected typically 3 days after administration and usually last for approximately 2 weeks. Patient should see the endocrinologist with whom he is already following for labs and management. Wishes to transition care to our endocrine faculty practice, contact information below.  - tele monitoring  - trend Ca/albumin at least daily   - consider checking spep/upep/immunofixation given anemia & new renal dysfunction, although less likely given elevated PTH  - Endocrine surgery on board, no acute surgical interventions at this time. MRI neck done yesterday (see below) but may be limited for surgical pre-op planning purposes. Dr. Lundy will speak with radiologists regarding imaging and patient may need to get another MRI with 4D imaging. Has an appointment 4 D CT so is interested in being discharged if nothing else is being actively managed here. Dr. Lundy told our team that the patient can be discharged now and further planning as outpatient. From an Endocrine perspective patient can be discharged home with close outpt fu   - MRI neck soft tissue with and without contrast (had been ordered by Dr. Lundy outpatient prior to this admission) while inpatient showed diffusely enlarged thyroid gland. Asymmetrically enlarged left thyroid lobe. Left lower pole pole 3.5 cm heterogeneous nodule. Posterior medial to the left thyroid lobe 1.2 cm nodule may be exophytic thyroid versus parathyroid versus other in origin.  -Patient wishes to f/u outpatient with Endocrinology faculty practice [facility]: Endocrinology at [place].   [address] [place] [address], [address]  [place] [address]  Phone: [phone]  Fax: [phone]    #Hyperthyroidism   TSH 0.40, FT4 1.4  - continue home methimazole 2.5mg daily - LFTs & ANC wnl   - outpatient follow up with his private endocrinologist     #Thyroid Nodules   multiple thyroid nodules noted on recent US thyroid/parathyroid performed at [facility] (results in HIE)   reports being monitored/previously bx benign many years ago   US thyroid/parathyroid at [facility] revealed R. 1cm interpolar nodule, 0.6cm cyst, 1.1cm heterogenous nodule and L. 3.8 heterogenous lower pole nodule, 2.1cm heterogenous upper pole nodule along with 2 x 1.2 x 0.8 cm hypoechoic nodule along posterior boarder of L. lower pole thyroid   - outpatient follow up with his private endocrinologist and head/neck surgeon    Discussed with Dr. Massimo Napoles DO, Endocrinology Fellow  For follow-up questions, discharge recommendations, or new consults please call answering service at [phone] (weekdays), [phone] (nights/weekends). For nonurgent matters, please email [email or web address] or [email or web address].

## 2023-01-18 NOTE — PROGRESS NOTE ADULT - ATTENDING COMMENTS
Severe primary hyperparathyroidism (PTH 500s), given rapidity of onset there is also concern for parathyroid carcinoma vs severe primary hyperparathyroidism. Case has been discussed with Dr. Lundy, head and neck surgeon who will see the pt outpt         -in his case, etiology likely consistent with primary hyperparathyroidism, for which surgery would be a treatment option, and surgery can be discussed outpt   he meets criteria for surgery given calcium elevation and in the interim would continue sensipar 30mg BID and this can be titrated up outpt.   Labs to be checked within 5 days of dc. monitor for hypocalemia while on this medication. SE can also include GI upset, and GI bleeding.  - please be careful with phosphorous repletion, do not want over-replete as can make primary hyperparathyroidism worse. can also precipitate kidney stones in setting of hypercalcemia if calcium-phos product is above 50.    encourage PO intake of nuts, eggs, and get phosphorous thru diet rather than supplementation    Patient is s/p pamidronate with improvement of calcium to normal range for a short time period-  The lasting effect of calcium control x 2 weeks.  Ideally plan surgery during this time. GFR 30s will need trend.  Patient also with sizable thyroid nodules - Dr. Lundy likely to perform thyroid lobectomy at time of parathyroid surgery.  MRI for surgical planning completed. to get 4D CT tomorrow however would recommend that protocol call be approved by radiology given crcl     Patient continues on cinacalcet 30mg BID, not increasing dose now as recently given pamidronate.   If surgery occurs after pamidronate wearing off may raise to higher dose cinacalcet until surgery.- this will need to be determined outpt (pt will have labs this friday or monday 1/23)  25OHD not low (62.7).    also need to rule out etiologies such as multiple myeloma given anemia and elevated free kappa light chains on labs, renal function normal, UPEP nl, SPEP with hypoalbumemia, NAVARRO pending) and started on sensipar inpt.        For DC he will need:  -outpt BMP in 5 days of dc to monitor calcium and titrate sensipar   - Ensure 25-OH vitamin D >20 ng/dl  - Repeat BMP and calcium outpt, serum creatinine GFR >60  - will need a Bone density to include distal 1/3 radius. Consider vertebral spine assessment with x-ray or DXA  - Advised patient drink plenty of water to keep urination color light to prevent nephrolithiasis   - Recommend diet low in fructose, salt and animal protein (advised against excessive vitamin C supplementation)     needs outpt endocrine followup, in the interim should followup with PCP to monitor labs and calcium   If patient wishes to follow up with [facility]     [facility]  [address], [address], [place] [address]  [phone]   Please call to schedule appointment with MD appointment next available but in the interim needs to fu with his endocrinologist     He should also make an appt with Dr. Lundy,  Phone: [phone], head and neck surgery [address], [address] [address]        High risk patient high level decision making.

## 2023-01-18 NOTE — PROGRESS NOTE ADULT - PROBLEM SELECTOR PROBLEM 4
OCD (obsessive compulsive disorder)

## 2023-01-18 NOTE — DISCHARGE NOTE NURSING/CASE MANAGEMENT/SOCIAL WORK - PATIENT PORTAL LINK FT
You can access the FollowMyHealth Patient Portal offered by [facility] by registering at the following website: [email or web address]. By joining [employer]’s FollowMyHealth portal, you will also be able to view your health information using other applications (apps) compatible with our system.

## 2023-01-18 NOTE — PROGRESS NOTE ADULT - REASON FOR ADMISSION
Hypercalcemia

## 2023-01-18 NOTE — PROGRESS NOTE ADULT - SUBJECTIVE AND OBJECTIVE BOX
Chief Complaint: Hypercalcemia    History: Patient feeling well. No confusion, abd pain, dizziness, constipation.    MEDICATIONS  (STANDING):  amLODIPine   Tablet 10 milliGRAM(s) Oral daily  atorvastatin 10 milliGRAM(s) Oral at bedtime  cinacalcet 30 milliGRAM(s) Oral two times a day  doxazosin 4 milliGRAM(s) Oral at bedtime  heparin   Injectable 5000 Unit(s) SubCutaneous every 12 hours  magnesium oxide 400 milliGRAM(s) Oral three times a day with meals  methimazole 2.5 milliGRAM(s) Oral daily  pantoprazole    Tablet 40 milliGRAM(s) Oral before breakfast  PARoxetine 50 milliGRAM(s) Oral daily  sucralfate 1 Gram(s) Oral <User Schedule>    MEDICATIONS  (PRN):  acetaminophen     Tablet .. 650 milliGRAM(s) Oral every 6 hours PRN Temp greater or equal to 38C (100.4F), Mild Pain (1 - 3)      PHYSICAL EXAM:  VITALS: T(C): 36.9 (01-17-23 @ 14:03)  T(F): 98.4 (01-17-23 @ 14:03), Max: 98.5 (01-17-23 @ 08:32)  HR: 66 (01-17-23 @ 14:03) (66 - 100)  BP: 117/76 (01-17-23 @ 14:03) (117/76 - 149/86)  RR:  (16 - 18)  SpO2:  (96% - 100%)  Wt(kg): --  General: Well-developed male, No acute distress, Speaking full sentences.   Eye:  Extraocular movements are intact, No proptosis or lid lag, No scleral icterus.   Respiratory:  Respirations are non-labored, Symmetric chest wall expansion.  Cardiovascular:  tachycardic, Regular rhythm, No edema.  Gastrointestinal:  Soft, Non-tender, Non-distended.   Integumentary:  Warm, dry.        01-17    143  |  111<H>  |  25<H>  ----------------------------<  88  3.4<L>   |  22  |  1.97<H>    eGFR: 35<L>    Ca    9.1      01-17  Mg     1.80     01-17  Phos  2.3     01-17    TPro  6.0  /  Alb  3.5  /  TBili  0.3  /  DBili  x   /  AST  19  /  ALT  23  /  AlkPhos  61  01-17          Thyroid Function Tests:  01-13 @ 05:40 TSH -- FreeT4 1.4 T3 -- Anti TPO -- Anti Thyroglobulin Ab -- TSI --  01-12 @ 18:00 TSH 0.40 FreeT4 -- T3 -- Anti TPO -- Anti Thyroglobulin Ab -- TSI --            
    Chief Complaint: Hypercalcemia    History: No abd pain or confusion. Was initiated on cinacalcet 30mg BID at least 1 week ago. Spoke with son on phone and answered questions.    MEDICATIONS  (STANDING):  amLODIPine   Tablet 10 milliGRAM(s) Oral daily  atorvastatin 10 milliGRAM(s) Oral at bedtime  cinacalcet 30 milliGRAM(s) Oral two times a day  doxazosin 4 milliGRAM(s) Oral at bedtime  heparin   Injectable 5000 Unit(s) SubCutaneous every 12 hours  magnesium oxide 400 milliGRAM(s) Oral three times a day with meals  methimazole 2.5 milliGRAM(s) Oral daily  pantoprazole    Tablet 40 milliGRAM(s) Oral before breakfast  PARoxetine 50 milliGRAM(s) Oral daily  sucralfate 1 Gram(s) Oral <User Schedule>    MEDICATIONS  (PRN):  acetaminophen     Tablet .. 650 milliGRAM(s) Oral every 6 hours PRN Temp greater or equal to 38C (100.4F), Mild Pain (1 - 3)      PHYSICAL EXAM:  VITALS: T(C): 36.4 (01-18-23 @ 11:42)  T(F): 97.6 (01-18-23 @ 11:42), Max: 98.8 (01-17-23 @ 20:34)  HR: 100 (01-18-23 @ 11:42) (88 - 100)  BP: 147/86 (01-18-23 @ 11:42) (140/83 - 147/86)  RR:  (16 - 17)  SpO2:  (96% - 100%)  Wt(kg): --  General: Well-developed male, No acute distress, Speaking full sentences.   Eye:  Extraocular movements are intact, No proptosis or lid lag, No scleral icterus.   Respiratory:  Respirations are non-labored, Symmetric chest wall expansion.  Cardiovascular:  Normal rate, Regular rhythm, No edema.  Gastrointestinal:  Soft, Non-tender, Non-distended.   Integumentary:  Warm, dry.        01-18    141  |  110<H>  |  27<H>  ----------------------------<  87  3.9   |  23  |  1.98<H>    eGFR: 35<L>    Ca    9.2      01-18  Mg     1.50     01-18  Phos  1.5     01-18    TPro  6.0  /  Alb  3.5  /  TBili  0.3  /  DBili  x   /  AST  19  /  ALT  23  /  AlkPhos  61  01-17          Thyroid Function Tests:  01-13 @ 05:40 TSH -- FreeT4 1.4 T3 -- Anti TPO -- Anti Thyroglobulin Ab -- TSI --  01-12 @ 18:00 TSH 0.40 FreeT4 -- T3 -- Anti TPO -- Anti Thyroglobulin Ab -- TSI --                    
Stephanie Yun MD  [facility] Division of Hospital Medicine  Pager [phone] (M-F 8AM-5PM)  Other Times: [phone]    Patient is a 77y old  Male who presents with a chief complaint of Hypercalcemia (17 Jan 2023 10:49)    SUBJECTIVE / OVERNIGHT EVENTS: no acute events overnight    MEDICATIONS  (STANDING):  amLODIPine   Tablet 10 milliGRAM(s) Oral daily  atorvastatin 10 milliGRAM(s) Oral at bedtime  cinacalcet 30 milliGRAM(s) Oral two times a day  doxazosin 4 milliGRAM(s) Oral at bedtime  heparin   Injectable 5000 Unit(s) SubCutaneous every 12 hours  magnesium oxide 400 milliGRAM(s) Oral three times a day with meals  methimazole 2.5 milliGRAM(s) Oral daily  pantoprazole    Tablet 40 milliGRAM(s) Oral before breakfast  PARoxetine 50 milliGRAM(s) Oral daily  sucralfate 1 Gram(s) Oral <User Schedule>    MEDICATIONS  (PRN):  acetaminophen     Tablet .. 650 milliGRAM(s) Oral every 6 hours PRN Temp greater or equal to 38C (100.4F), Mild Pain (1 - 3)      PHYSICAL EXAM:  Vital Signs Last 24 Hrs  T(C): 36.9 (17 Jan 2023 14:03), Max: 36.9 (16 Jan 2023 20:23)  T(F): 98.4 (17 Jan 2023 14:03), Max: 98.5 (17 Jan 2023 08:32)  HR: 66 (17 Jan 2023 14:03) (66 - 100)  BP: 117/76 (17 Jan 2023 14:03) (117/76 - 149/86)  RR: 16 (17 Jan 2023 14:03) (16 - 18)  SpO2: 98% (17 Jan 2023 14:03) (96% - 100%)    Parameters below as of 17 Jan 2023 14:03  Patient On (Oxygen Delivery Method): room air    CONSTITUTIONAL: NAD, well-developed, well-groomed  RESPIRATORY: Normal respiratory effort; lungs are clear to auscultation bilaterally  CARDIOVASCULAR: Regular rate and rhythm, normal S1 and S2, no murmur/rub/gallop; No lower extremity edema  GASTROINTESTINAL: Nontender to palpation, normoactive bowel sounds, no rebound/guarding; No hepatosplenomegaly  MUSCULOSKELETAL:  no clubbing or cyanosis of digits; no joint swelling or tenderness to palpation  NEUROLOGY: non-focal; no gross sensory deficits   PSYCH: A+O to person, place, and time; affect appropriate  SKIN: No rashes; warm     LABS:                        9.9    6.70  )-----------( 211      ( 17 Jan 2023 07:10 )             30.9     01-17    143  |  111<H>  |  25<H>  ----------------------------<  88  3.4<L>   |  22  |  1.97<H>    Ca    9.1      17 Jan 2023 07:10  Phos  2.3     01-17  Mg     1.80     01-17    TPro  6.0  /  Alb  3.5  /  TBili  0.3  /  DBili  x   /  AST  19  /  ALT  23  /  AlkPhos  61  01-17                RADIOLOGY & ADDITIONAL TESTS:  Results Reviewed:   Imaging Personally Reviewed:  Electrocardiogram Personally Reviewed:    COORDINATION OF CARE:  Care Discussed with Consultants/Other Providers [Y/N]:  Prior or Outpatient Records Reviewed [Y/N]:  
Surgery Progress Note    Subjective:     Patient seen and examined at bedside. VSS, AF. Patient endorses he is feeling better with mIVF.     OBJECTIVE:     T(C): 36.6 (01-13-23 @ 05:40), Max: 37.2 (01-13-23 @ 00:39)  HR: 78 (01-13-23 @ 05:40) (72 - 93)  BP: 152/72 (01-13-23 @ 05:40) (140/71 - 157/83)  RR: 18 (01-13-23 @ 05:40) (17 - 18)  SpO2: 98% (01-13-23 @ 05:40) (97% - 99%)  Wt(kg): --    I&O's Detail      PHYSICAL EXAM:    General: NAD, resting comfortably  HEENT: NC/AT, EOMI, normal hearing, no oral lesions, no LAD, neck supple  Pulmonary: normal resp effort, CTA-B  Cardiovascular: NSR, no murmurs  Abdominal: soft, ND/NT, no organomegaly  Extremities: WWP, normal strength, no clubbing/cyanosis/edema  Neuro: A/O x 3, CNs II-XII grossly intact, normal sensation, no focal deficits  Pulses: palpable distal pulses    MEDICATIONS  (STANDING):  amLODIPine   Tablet 10 milliGRAM(s) Oral daily  atorvastatin 10 milliGRAM(s) Oral at bedtime  cinacalcet 30 milliGRAM(s) Oral two times a day  doxazosin 4 milliGRAM(s) Oral at bedtime  heparin   Injectable 5000 Unit(s) SubCutaneous every 12 hours  methimazole 2.5 milliGRAM(s) Oral daily  pantoprazole    Tablet 40 milliGRAM(s) Oral before breakfast  PARoxetine 50 milliGRAM(s) Oral daily  potassium chloride   Powder 40 milliEquivalent(s) Oral once  sodium chloride 0.9%. 1000 milliLiter(s) (100 mL/Hr) IV Continuous <Continuous>  sucralfate 1 Gram(s) Oral <User Schedule>    MEDICATIONS  (PRN):  acetaminophen     Tablet .. 650 milliGRAM(s) Oral every 6 hours PRN Temp greater or equal to 38C (100.4F), Mild Pain (1 - 3)      LABS:                          9.6    7.83  )-----------( 212      ( 13 Jan 2023 05:40 )             30.2     01-13    140  |  107  |  32<H>  ----------------------------<  94  3.1<L>   |  25  |  2.22<H>    Ca    11.8<H>      13 Jan 2023 05:40  Phos  2.6     01-12  Mg     1.90     01-13    TPro  5.7<L>  /  Alb  3.4  /  TBili  0.4  /  DBili  <0.2  /  AST  17  /  ALT  18  /  AlkPhos  57  01-13    PT/INR - ( 13 Jan 2023 05:40 )   PT: 13.5 sec;   INR: 1.16 ratio                   
Medicine Progress Note    Patient is a 77y old  Male who presents with a chief complaint of Hypercalcemia (15 Luiz 2023 12:41)      SUBJECTIVE / OVERNIGHT EVENTS: no events ,no complains , pending MRI         MEDICATIONS  (STANDING):  amLODIPine   Tablet 10 milliGRAM(s) Oral daily  atorvastatin 10 milliGRAM(s) Oral at bedtime  cinacalcet 30 milliGRAM(s) Oral two times a day  doxazosin 4 milliGRAM(s) Oral at bedtime  heparin   Injectable 5000 Unit(s) SubCutaneous every 12 hours  methimazole 2.5 milliGRAM(s) Oral daily  pantoprazole    Tablet 40 milliGRAM(s) Oral before breakfast  PARoxetine 50 milliGRAM(s) Oral daily  sucralfate 1 Gram(s) Oral <User Schedule>    MEDICATIONS  (PRN):  acetaminophen     Tablet .. 650 milliGRAM(s) Oral every 6 hours PRN Temp greater or equal to 38C (100.4F), Mild Pain (1 - 3)    CAPILLARY BLOOD GLUCOSE        I&O's Summary    16 Jan 2023 07:01  -  16 Jan 2023 14:08  --------------------------------------------------------  IN: 0 mL / OUT: 200 mL / NET: -200 mL        PHYSICAL EXAM:  Vital Signs Last 24 Hrs  T(C): 36.6 (16 Jan 2023 11:13), Max: 36.6 (16 Jan 2023 05:00)  T(F): 97.8 (16 Jan 2023 11:13), Max: 97.9 (16 Jan 2023 05:00)  HR: 95 (16 Jan 2023 11:13) (94 - 100)  BP: 130/69 (16 Jan 2023 11:13) (128/73 - 155/91)  BP(mean): --  RR: 18 (16 Jan 2023 11:13) (16 - 18)  SpO2: 98% (16 Jan 2023 11:13) (96% - 100%)    Parameters below as of 16 Jan 2023 11:13  Patient On (Oxygen Delivery Method): room air      CONSTITUTIONAL: NAD,   ENMT: Moist oral mucosa,   RESPIRATORY: Normal respiratory effort; lungs are clear to auscultation bilaterally  CARDIOVASCULAR: Regular rate and rhythm, normal S1 and S2, No lower extremity edema;   ABDOMEN: Nontender to palpation, normoactive bowel sounds, no rebound/guarding;   PSYCH: A+O to person, place, and time; affect appropriate  NEUROLOGY: CN 2-12 are intact and symmetric; no gross sensory deficits   SKIN: No rashes; no palpable lesions    LABS:                        9.1    6.07  )-----------( 195      ( 16 Jan 2023 06:23 )             28.3     01-16    143  |  112<H>  |  29<H>  ----------------------------<  90  3.3<L>   |  22  |  2.11<H>    Ca    9.0      16 Jan 2023 06:23  Phos  2.1     01-16  Mg     1.40     01-16    TPro  5.4<L>  /  Alb  3.4  /  TBili  0.3  /  DBili  x   /  AST  16  /  ALT  18  /  AlkPhos  51  01-16                  RADIOLOGY & ADDITIONAL TESTS:  Imaging from Last 24 Hours:    Electrocardiogram/QTc Interval:    COORDINATION OF CARE:  Care Discussed with Consultants/Other Providers:  
SURGERY  Pager: [phone]    INTERVAL EVENTS/SUBJECTIVE: No acute events overnight. Denies numbness, tingling, no new complaints.     ______________________________________________  OBJECTIVE:   T(C): 36.9 (01-17-23 @ 08:32), Max: 36.9 (01-16-23 @ 20:23)  HR: 85 (01-17-23 @ 08:32) (85 - 100)  BP: 135/67 (01-17-23 @ 08:32) (130/69 - 149/80)  RR: 16 (01-17-23 @ 08:32) (16 - 18)  SpO2: 97% (01-17-23 @ 08:32) (97% - 100%)  Wt(kg): --  CAPILLARY BLOOD GLUCOSE        I&O's Detail    16 Jan 2023 07:01  -  17 Jan 2023 07:00  --------------------------------------------------------  IN:  Total IN: 0 mL    OUT:    Voided (mL): 625 mL  Total OUT: 625 mL    Total NET: -625 mL          Physical exam:  General: NAD, resting comfortably  HEENT: NC/AT, EOMI  Pulmonary: normal resp effort, no increased WOB   Cardiovascular: warm, well perfused   Abdominal: soft, ND/NT, no organomegaly  Neuro: A/O x 3, CNs II-XII grossly intact, normal sensation, no focal deficits    ______________________________________________  LABS:  CBC Full  -  ( 17 Jan 2023 07:10 )  WBC Count : 6.70 K/uL  RBC Count : 3.64 M/uL  Hemoglobin : 9.9 g/dL  Hematocrit : 30.9 %  Platelet Count - Automated : 211 K/uL  Mean Cell Volume : 84.9 fL  Mean Cell Hemoglobin : 27.2 pg  Mean Cell Hemoglobin Concentration : 32.0 gm/dL  Auto Neutrophil # : 4.41 K/uL  Auto Lymphocyte # : 1.34 K/uL  Auto Monocyte # : 0.63 K/uL  Auto Eosinophil # : 0.24 K/uL  Auto Basophil # : 0.04 K/uL  Auto Neutrophil % : 65.8 %  Auto Lymphocyte % : 20.0 %  Auto Monocyte % : 9.4 %  Auto Eosinophil % : 3.6 %  Auto Basophil % : 0.6 %    01-17    143  |  111<H>  |  25<H>  ----------------------------<  88  3.4<L>   |  22  |  1.97<H>    Ca    9.1      17 Jan 2023 07:10  Phos  2.3     01-17  Mg     1.80     01-17    TPro  6.0  /  Alb  3.5  /  TBili  0.3  /  DBili  x   /  AST  19  /  ALT  23  /  AlkPhos  61  01-17    _____________________________________________  RADIOLOGY:    
Internal Medicine   Jose Rafael Catarino | PGY-3    OVERNIGHT EVENTS: No acute overnight events, calcium continues to improve remains on NS IVF. No skilled PT needs. Pending MRI    SUBJECTIVE: Patient was seen and examined at bedside this morning. Denies any nausea/vomiting/diarrhea, headache, shortness of breath, abdominal pain or chest pain/palpitations. Patient responding appropriately to questions and able to make needs known. Vital signs/imaging/telemetry events reviewed.       MEDICATIONS  (STANDING):  amLODIPine   Tablet 10 milliGRAM(s) Oral daily  atorvastatin 10 milliGRAM(s) Oral at bedtime  cinacalcet 30 milliGRAM(s) Oral two times a day  doxazosin 4 milliGRAM(s) Oral at bedtime  heparin   Injectable 5000 Unit(s) SubCutaneous every 12 hours  methimazole 2.5 milliGRAM(s) Oral daily  pantoprazole    Tablet 40 milliGRAM(s) Oral before breakfast  PARoxetine 50 milliGRAM(s) Oral daily  sodium chloride 0.9%. 1000 milliLiter(s) (100 mL/Hr) IV Continuous <Continuous>  sucralfate 1 Gram(s) Oral <User Schedule>    MEDICATIONS  (PRN):  acetaminophen     Tablet .. 650 milliGRAM(s) Oral every 6 hours PRN Temp greater or equal to 38C (100.4F), Mild Pain (1 - 3)        T(F): 97.9 (01-15-23 @ 06:00), Max: 97.9 (01-15-23 @ 06:00)  HR: 72 (01-15-23 @ 06:00) (72 - 80)  BP: 122/71 (01-15-23 @ 06:00) (122/71 - 142/77)  BP(mean): --  RR: 18 (01-15-23 @ 06:00) (18 - 18)  SpO2: 100% (01-15-23 @ 06:00) (100% - 100%)    PHYSICAL EXAM:     GENERAL: NAD, lying in bed comfortably  HEAD:  Atraumatic, Normocephalic  EYES: EOMI, PERRLA, conjunctiva and sclera clear, no nystagmus noted  ENT: Moist mucous membranes,   NECK: Supple, No JVD, trachea midline  CHEST/LUNG: Clear to auscultation bilaterally; No rales, rhonchi, wheezing, or rubs. Unlabored respirations  HEART: Regular rate and rhythm; No murmurs, rubs, or gallops, normal S1/S2  ABDOMEN: normal bowel sounds; Soft, nontender, nondistended, no organomegaly   EXTREMITIES:  2+ Peripheral Pulses, brisk capillary refill. No clubbing, cyanosis, or edema  MSK: No gross deformities noted   Neurological:  A&Ox3, no focal deficits   SKIN: No rashes or lesions  PSYCH: Normal mood, affect     TELEMETRY:    LABS:                        8.9    6.51  )-----------( 198      ( 15 Luiz 2023 05:32 )             27.3     01-14    139  |  107  |  32<H>  ----------------------------<  90  3.1<L>   |  24  |  2.21<H>    Ca    11.0<H>      14 Jan 2023 06:00  Phos  1.6     01-14  Mg     1.60     01-14              Creatinine Trend: 2.21<--, 2.22<--, 2.24<--, 1.99<--, 1.96<--, 2.22<--  I&O's Summary    BNP    RADIOLOGY & ADDITIONAL STUDIES:            
Internal Medicine   Victor Manuel Reich | PGY-1    OVERNIGHT EVENTS: No acute overnight events.    SUBJECTIVE: Feeling well, no acute concerns. GI sx and LE weakness have not recurred. No chest pain/palpitations, no shortness of breath. Awaiting MRI.    MEDICATIONS  (STANDING):  amLODIPine   Tablet 10 milliGRAM(s) Oral daily  atorvastatin 10 milliGRAM(s) Oral at bedtime  cinacalcet 30 milliGRAM(s) Oral two times a day  doxazosin 4 milliGRAM(s) Oral at bedtime  heparin   Injectable 5000 Unit(s) SubCutaneous every 12 hours  methimazole 2.5 milliGRAM(s) Oral daily  pantoprazole    Tablet 40 milliGRAM(s) Oral before breakfast  PARoxetine 50 milliGRAM(s) Oral daily  potassium phosphate / sodium phosphate Powder (PHOS-NaK) 1 Packet(s) Oral three times a day  sodium chloride 0.9%. 1000 milliLiter(s) (100 mL/Hr) IV Continuous <Continuous>  sucralfate 1 Gram(s) Oral <User Schedule>    MEDICATIONS  (PRN):  acetaminophen     Tablet .. 650 milliGRAM(s) Oral every 6 hours PRN Temp greater or equal to 38C (100.4F), Mild Pain (1 - 3)    VITALS:  T(F): 97.8 (01-14-23 @ 06:00), Max: 98.3 (01-13-23 @ 13:34)  HR: 80 (01-14-23 @ 06:00) (80 - 91)  BP: 146/78 (01-14-23 @ 06:00) (141/73 - 150/78)  BP(mean): --  RR: 17 (01-14-23 @ 06:00) (17 - 18)  SpO2: 100% (01-14-23 @ 06:00) (100% - 100%)    PHYSICAL EXAM:   GENERAL: NAD, awake and conversational, able to ambulate to bathroom independently without assistance  HEAD: Atraumatic, normocephalic  EYES: EOMI, conjunctiva and sclera clear, no nystagmus noted  ENT: Moist mucous membranes  CHEST/LUNG: Clear to auscultation bilaterally; no rales, rhonchi, wheezing, or rubs; unlabored respirations  HEART: Regular rate and rhythm; no murmurs, rubs, or gallops, normal S1/S2  ABDOMEN: Normal bowel sounds; soft, nontender, nondistended  EXTREMITIES: No clubbing, cyanosis, or edema  MSK: No gross deformities noted   NEURO: No focal deficits   SKIN: No rashes or lesions  PSYCH: Normal mood, affect     LABS:                      9.6    7.02  )-----------( 223      ( 14 Jan 2023 06:00 )             29.6     01-14  139  |  107  |  32<H>  ----------------------------<  90  3.1<L>   |  24  |  2.21<H>    Ca    11.0<H>      14 Jan 2023 06:00  Phos  1.6     01-14  Mg     1.60     01-14    TPro  5.7<L>  /  Alb  3.4  /  TBili  0.4  /  DBili  <0.2  /  AST  17  /  ALT  18  /  AlkPhos  57  01-13    PT/INR - ( 13 Jan 2023 05:40 )   PT: 13.5 sec;   INR: 1.16 ratio      Creatinine Trend: 2.21<--, 2.22<--, 2.24<--, 1.99<--, 1.96<--, 2.22<--
Stephanie Yun MD  [facility] Division of Hospital Medicine  Pager [phone] (M-F 8AM-5PM)  Other Times: [phone]    Patient is a 77y old  Male who presents with a chief complaint of Hypercalcemia (18 Jan 2023 16:23)    SUBJECTIVE / OVERNIGHT EVENTS: no acute events overnight    MEDICATIONS  (STANDING):  amLODIPine   Tablet 10 milliGRAM(s) Oral daily  atorvastatin 10 milliGRAM(s) Oral at bedtime  cinacalcet 30 milliGRAM(s) Oral two times a day  doxazosin 4 milliGRAM(s) Oral at bedtime  heparin   Injectable 5000 Unit(s) SubCutaneous every 12 hours  magnesium oxide 400 milliGRAM(s) Oral three times a day with meals  methimazole 2.5 milliGRAM(s) Oral daily  pantoprazole    Tablet 40 milliGRAM(s) Oral before breakfast  PARoxetine 50 milliGRAM(s) Oral daily  sucralfate 1 Gram(s) Oral <User Schedule>    MEDICATIONS  (PRN):  acetaminophen     Tablet .. 650 milliGRAM(s) Oral every 6 hours PRN Temp greater or equal to 38C (100.4F), Mild Pain (1 - 3)      PHYSICAL EXAM:  Vital Signs Last 24 Hrs  T(C): 36.4 (18 Jan 2023 11:42), Max: 37.1 (17 Jan 2023 20:34)  T(F): 97.6 (18 Jan 2023 11:42), Max: 98.8 (17 Jan 2023 20:34)  HR: 100 (18 Jan 2023 11:42) (88 - 100)  BP: 147/86 (18 Jan 2023 11:42) (140/83 - 147/86)  RR: 17 (18 Jan 2023 11:42) (16 - 17)  SpO2: 96% (18 Jan 2023 11:42) (96% - 100%)    Parameters below as of 18 Jan 2023 11:42  Patient On (Oxygen Delivery Method): room air        CONSTITUTIONAL: NAD, well-developed, well-groomed  RESPIRATORY: Normal respiratory effort; lungs are clear to auscultation bilaterally  CARDIOVASCULAR: Regular rate and rhythm, normal S1 and S2, no murmur/rub/gallop; No lower extremity edema  GASTROINTESTINAL: Nontender to palpation, normoactive bowel sounds, no rebound/guarding; No hepatosplenomegaly  MUSCULOSKELETAL:  no clubbing or cyanosis of digits; no joint swelling or tenderness to palpation  NEUROLOGY: non-focal; no gross sensory deficits   PSYCH: A+O to person, place, and time; affect appropriate  SKIN: No rashes; warm     LABS:                        9.9    6.70  )-----------( 211      ( 17 Jan 2023 07:10 )             30.9     01-18    141  |  110<H>  |  27<H>  ----------------------------<  87  3.9   |  23  |  1.98<H>    Ca    9.2      18 Jan 2023 06:05  Phos  1.5     01-18  Mg     1.50     01-18    TPro  6.0  /  Alb  3.5  /  TBili  0.3  /  DBili  x   /  AST  19  /  ALT  23  /  AlkPhos  61  01-17                RADIOLOGY & ADDITIONAL TESTS:  Results Reviewed:   Imaging Personally Reviewed:  Electrocardiogram Personally Reviewed:    COORDINATION OF CARE:  Care Discussed with Consultants/Other Providers [Y/N]:  Prior or Outpatient Records Reviewed [Y/N]:  
Internal Medicine   Victor Manuel Reich | PGY-1    OVERNIGHT EVENTS: No acute overnight events.    SUBJECTIVE: Symptoms improving, lower extremity weakness improved and feels he is able to walk better now. Abdominal discomfort also improving. No chest pain, shortness of breath.    MEDICATIONS  (STANDING):  amLODIPine   Tablet 10 milliGRAM(s) Oral daily  atorvastatin 10 milliGRAM(s) Oral at bedtime  cinacalcet 30 milliGRAM(s) Oral two times a day  doxazosin 4 milliGRAM(s) Oral at bedtime  heparin   Injectable 5000 Unit(s) SubCutaneous every 12 hours  methimazole 2.5 milliGRAM(s) Oral daily  pantoprazole    Tablet 40 milliGRAM(s) Oral before breakfast  PARoxetine 50 milliGRAM(s) Oral daily  sodium chloride 0.9%. 1000 milliLiter(s) (100 mL/Hr) IV Continuous <Continuous>  sucralfate 1 Gram(s) Oral <User Schedule>    MEDICATIONS  (PRN):  acetaminophen     Tablet .. 650 milliGRAM(s) Oral every 6 hours PRN Temp greater or equal to 38C (100.4F), Mild Pain (1 - 3)    VITALS:  T(F): 98.3 (01-13-23 @ 13:34), Max: 98.9 (01-13-23 @ 00:39)  HR: 89 (01-13-23 @ 13:34) (72 - 93)  BP: 150/78 (01-13-23 @ 13:34) (140/71 - 157/83)  BP(mean): --  RR: 18 (01-13-23 @ 13:34) (17 - 18)  SpO2: 100% (01-13-23 @ 13:34) (97% - 100%)    PHYSICAL EXAM:   GENERAL: NAD, lying in bed comfortably, able to sit/ambulate independently without issue  HEAD: Atraumatic, normocephalic  EYES: EOMI, conjunctiva and sclera clear, no nystagmus noted  ENT: Moist mucous membranes  CHEST/LUNG: Clear to auscultation bilaterally; no rales, rhonchi, wheezing, or rubs; unlabored respirations  HEART: Regular rate and rhythm; no murmurs, rubs, or gallops, normal S1/S2  ABDOMEN: Normal bowel sounds; soft, nontender, nondistended  EXTREMITIES: No clubbing, cyanosis, or edema  MSK: No gross deformities noted   NEURO: No focal deficits; leg raise grossly 5/5  SKIN: No rashes or lesions  PSYCH: Normal mood, affect     LABS:                      9.6    7.83  )-----------( 212      ( 13 Jan 2023 05:40 )             30.2     01-13  140  |  107  |  32<H>  ----------------------------<  94  3.1<L>   |  25  |  2.22<H>    Ca    11.8<H>      13 Jan 2023 05:40  Phos  2.6     01-12  Mg     1.90     01-13    TPro  5.7<L>  /  Alb  3.4  /  TBili  0.4  /  DBili  <0.2  /  AST  17  /  ALT  18  /  AlkPhos  57  01-13    PT/INR - ( 13 Jan 2023 05:40 )   PT: 13.5 sec;   INR: 1.16 ratio      Creatinine Trend: 2.22<--, 2.24<--, 1.99<--, 1.96<--, 2.22<--, 2.36<--

## 2023-01-18 NOTE — DISCHARGE NOTE NURSING/CASE MANAGEMENT/SOCIAL WORK - NSDCFUADDAPPT_GEN_ALL_CORE_FT
Please follow up with your primary care physician regarding your hospitalization and for further monitoring/management as well as repeat blood work (CBC, BMP) to recheck your electrolytes.  Please also follow up with your Endocrinologist by Monday 1/23/2023 for adjustment of your Sensipar dosing, if needed, based on your blood work which you should try to get done in the next 2 days.

## 2023-01-18 NOTE — PROGRESS NOTE ADULT - PROBLEM SELECTOR PLAN 6
DVT ppx: HSQ  DIET: DASH/TLC  DISPO: Home. Discussed with endo attg Dr. eKys and surg services - ok to dc with outpatient followup. Updated outpatient endocrinologist Dr. Ching Ledesma regarding hospital course - patient will followup in office on Monday. 37 mins spent dc planning.

## 2023-01-18 NOTE — PROGRESS NOTE ADULT - ASSESSMENT
77M with hx of prostate cancer (no tx), hyperthyroidism, CKD, HTN, HLD, recently admitted to [facility] for hypercalcemia (found to have parathyroid nodule), admitted with symptomatic hypercalcemia d/t hyperparathyroidism.

## 2023-01-18 NOTE — PROGRESS NOTE ADULT - PROBLEM SELECTOR PROBLEM 2
History of hyperthyroidism

## 2023-01-18 NOTE — PROGRESS NOTE ADULT - PROBLEM SELECTOR PROBLEM 3
Chronic kidney disease, unspecified CKD stage

## 2023-01-19 ENCOUNTER — APPOINTMENT (OUTPATIENT)
Dept: MRI IMAGING | Facility: CLINIC | Age: 78
End: 2023-01-19

## 2023-01-19 ENCOUNTER — OUTPATIENT (OUTPATIENT)
Dept: OUTPATIENT SERVICES | Facility: HOSPITAL | Age: 78
LOS: 1 days | End: 2023-01-19

## 2023-01-19 ENCOUNTER — NON-APPOINTMENT (OUTPATIENT)
Age: 78
End: 2023-01-19

## 2023-01-19 DIAGNOSIS — Z00.8 ENCOUNTER FOR OTHER GENERAL EXAMINATION: ICD-10-CM

## 2023-01-20 ENCOUNTER — TRANSCRIPTION ENCOUNTER (OUTPATIENT)
Age: 78
End: 2023-01-20

## 2023-01-21 LAB — PTH RELATED PROT SERPL-MCNC: <2 PMOL/L — SIGNIFICANT CHANGE UP

## 2023-01-23 ENCOUNTER — OUTPATIENT (OUTPATIENT)
Dept: OUTPATIENT SERVICES | Facility: HOSPITAL | Age: 78
LOS: 1 days | End: 2023-01-23
Payer: COMMERCIAL

## 2023-01-23 ENCOUNTER — TRANSCRIPTION ENCOUNTER (OUTPATIENT)
Age: 78
End: 2023-01-23

## 2023-01-23 ENCOUNTER — NON-APPOINTMENT (OUTPATIENT)
Age: 78
End: 2023-01-23

## 2023-01-23 ENCOUNTER — INPATIENT (INPATIENT)
Facility: HOSPITAL | Age: 78
LOS: 6 days | Discharge: HOME | DRG: 481 | End: 2023-01-30
Attending: INTERNAL MEDICINE | Admitting: INTERNAL MEDICINE
Payer: COMMERCIAL

## 2023-01-23 VITALS
WEIGHT: 166.89 LBS | HEART RATE: 100 BPM | RESPIRATION RATE: 16 BRPM | DIASTOLIC BLOOD PRESSURE: 80 MMHG | TEMPERATURE: 98 F | SYSTOLIC BLOOD PRESSURE: 128 MMHG | OXYGEN SATURATION: 99 % | HEIGHT: 68.5 IN

## 2023-01-23 VITALS
SYSTOLIC BLOOD PRESSURE: 159 MMHG | WEIGHT: 164.91 LBS | HEIGHT: 68.5 IN | HEART RATE: 99 BPM | DIASTOLIC BLOOD PRESSURE: 82 MMHG | RESPIRATION RATE: 15 BRPM | TEMPERATURE: 98 F | OXYGEN SATURATION: 99 %

## 2023-01-23 DIAGNOSIS — E21.3 HYPERPARATHYROIDISM, UNSPECIFIED: ICD-10-CM

## 2023-01-23 DIAGNOSIS — S72.142A DISPLACED INTERTROCHANTERIC FRACTURE OF LEFT FEMUR, INITIAL ENCOUNTER FOR CLOSED FRACTURE: ICD-10-CM

## 2023-01-23 DIAGNOSIS — S72.009A FRACTURE OF UNSPECIFIED PART OF NECK OF UNSPECIFIED FEMUR, INITIAL ENCOUNTER FOR CLOSED FRACTURE: ICD-10-CM

## 2023-01-23 DIAGNOSIS — R06.83 SNORING: ICD-10-CM

## 2023-01-23 DIAGNOSIS — F42.9 OBSESSIVE-COMPULSIVE DISORDER, UNSPECIFIED: ICD-10-CM

## 2023-01-23 DIAGNOSIS — I10 ESSENTIAL (PRIMARY) HYPERTENSION: ICD-10-CM

## 2023-01-23 DIAGNOSIS — Z98.890 OTHER SPECIFIED POSTPROCEDURAL STATES: Chronic | ICD-10-CM

## 2023-01-23 DIAGNOSIS — Z86.39 PERSONAL HISTORY OF OTHER ENDOCRINE, NUTRITIONAL AND METABOLIC DISEASE: ICD-10-CM
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
## 2023-01-23 LAB
ALBUMIN SERPL ELPH-MCNC: 3.5 G/DL — SIGNIFICANT CHANGE UP (ref 3.3–5)
ALP SERPL-CCNC: 74 U/L — SIGNIFICANT CHANGE UP (ref 30–120)
ALT FLD-CCNC: 30 U/L DA — SIGNIFICANT CHANGE UP (ref 10–60)
ANION GAP SERPL CALC-SCNC: 11 MMOL/L — SIGNIFICANT CHANGE UP (ref 5–17)
APTT BLD: 29.1 SEC — SIGNIFICANT CHANGE UP (ref 27.5–35.5)
AST SERPL-CCNC: 19 U/L — SIGNIFICANT CHANGE UP (ref 10–40)
BASOPHILS # BLD AUTO: 0.04 K/UL — SIGNIFICANT CHANGE UP (ref 0–0.2)
BASOPHILS NFR BLD AUTO: 0.4 % — SIGNIFICANT CHANGE UP (ref 0–2)
BILIRUB SERPL-MCNC: 0.4 MG/DL — SIGNIFICANT CHANGE UP (ref 0.2–1.2)
BLD GP AB SCN SERPL QL: SIGNIFICANT CHANGE UP
BUN SERPL-MCNC: 21 MG/DL — SIGNIFICANT CHANGE UP (ref 7–23)
CALCIUM SERPL-MCNC: 9.3 MG/DL — SIGNIFICANT CHANGE UP (ref 8.4–10.5)
CHLORIDE SERPL-SCNC: 104 MMOL/L — SIGNIFICANT CHANGE UP (ref 96–108)
CO2 SERPL-SCNC: 23 MMOL/L — SIGNIFICANT CHANGE UP (ref 22–31)
CREAT SERPL-MCNC: 1.64 MG/DL — HIGH (ref 0.5–1.3)
EGFR: 43 ML/MIN/1.73M2 — LOW
EOSINOPHIL # BLD AUTO: 0.1 K/UL — SIGNIFICANT CHANGE UP (ref 0–0.5)
EOSINOPHIL NFR BLD AUTO: 1 % — SIGNIFICANT CHANGE UP (ref 0–6)
GLUCOSE SERPL-MCNC: 110 MG/DL — HIGH (ref 70–99)
HCT VFR BLD CALC: 31.8 % — LOW (ref 39–50)
HGB BLD-MCNC: 10.4 G/DL — LOW (ref 13–17)
IMM GRANULOCYTES NFR BLD AUTO: 0.5 % — SIGNIFICANT CHANGE UP (ref 0–0.9)
INR BLD: 1.17 RATIO — HIGH (ref 0.88–1.16)
LYMPHOCYTES # BLD AUTO: 1 K/UL — SIGNIFICANT CHANGE UP (ref 1–3.3)
LYMPHOCYTES # BLD AUTO: 9.7 % — LOW (ref 13–44)
MCHC RBC-ENTMCNC: 27.9 PG — SIGNIFICANT CHANGE UP (ref 27–34)
MCHC RBC-ENTMCNC: 32.7 GM/DL — SIGNIFICANT CHANGE UP (ref 32–36)
MCV RBC AUTO: 85.3 FL — SIGNIFICANT CHANGE UP (ref 80–100)
MONOCYTES # BLD AUTO: 0.68 K/UL — SIGNIFICANT CHANGE UP (ref 0–0.9)
MONOCYTES NFR BLD AUTO: 6.6 % — SIGNIFICANT CHANGE UP (ref 2–14)
NEUTROPHILS # BLD AUTO: 8.43 K/UL — HIGH (ref 1.8–7.4)
NEUTROPHILS NFR BLD AUTO: 81.8 % — HIGH (ref 43–77)
NRBC # BLD: 0 /100 WBCS — SIGNIFICANT CHANGE UP (ref 0–0)
PLATELET # BLD AUTO: 201 K/UL — SIGNIFICANT CHANGE UP (ref 150–400)
POTASSIUM SERPL-MCNC: 3.4 MMOL/L — LOW (ref 3.5–5.3)
POTASSIUM SERPL-SCNC: 3.4 MMOL/L — LOW (ref 3.5–5.3)
PROT SERPL-MCNC: 6.4 G/DL — SIGNIFICANT CHANGE UP (ref 6–8.3)
PROTHROM AB SERPL-ACNC: 13.5 SEC — HIGH (ref 10.5–13.4)
RBC # BLD: 3.73 M/UL — LOW (ref 4.2–5.8)
RBC # FLD: 15 % — HIGH (ref 10.3–14.5)
SARS-COV-2 RNA SPEC QL NAA+PROBE: SIGNIFICANT CHANGE UP
SODIUM SERPL-SCNC: 138 MMOL/L — SIGNIFICANT CHANGE UP (ref 135–145)
WBC # BLD: 10.3 K/UL — SIGNIFICANT CHANGE UP (ref 3.8–10.5)
WBC # FLD AUTO: 10.3 K/UL — SIGNIFICANT CHANGE UP (ref 3.8–10.5)

## 2023-01-23 PROCEDURE — 70450 CT HEAD/BRAIN W/O DYE: CPT | Mod: 26,MA

## 2023-01-23 PROCEDURE — 71045 X-RAY EXAM CHEST 1 VIEW: CPT | Mod: 26

## 2023-01-23 PROCEDURE — 73502 X-RAY EXAM HIP UNI 2-3 VIEWS: CPT | Mod: 26,LT

## 2023-01-23 PROCEDURE — 93010 ELECTROCARDIOGRAM REPORT: CPT

## 2023-01-23 PROCEDURE — 99285 EMERGENCY DEPT VISIT HI MDM: CPT

## 2023-01-23 PROCEDURE — 73552 X-RAY EXAM OF FEMUR 2/>: CPT | Mod: 26,LT

## 2023-01-23 RX ORDER — ACETAMINOPHEN 500 MG
650 TABLET ORAL EVERY 6 HOURS
Refills: 0 | Status: DISCONTINUED | OUTPATIENT
Start: 2023-01-23 | End: 2023-01-24

## 2023-01-23 RX ORDER — POTASSIUM CHLORIDE 20 MEQ
10 PACKET (EA) ORAL
Refills: 0 | Status: COMPLETED | OUTPATIENT
Start: 2023-01-23 | End: 2023-01-24

## 2023-01-23 RX ORDER — SODIUM CHLORIDE 9 MG/ML
1000 INJECTION INTRAMUSCULAR; INTRAVENOUS; SUBCUTANEOUS
Refills: 0 | Status: DISCONTINUED | OUTPATIENT
Start: 2023-01-23 | End: 2023-01-26

## 2023-01-23 RX ORDER — MORPHINE SULFATE 50 MG/1
4 CAPSULE, EXTENDED RELEASE ORAL ONCE
Refills: 0 | Status: DISCONTINUED | OUTPATIENT
Start: 2023-01-23 | End: 2023-01-23

## 2023-01-23 RX ORDER — ATORVASTATIN CALCIUM 80 MG/1
10 TABLET, FILM COATED ORAL AT BEDTIME
Refills: 0 | Status: DISCONTINUED | OUTPATIENT
Start: 2023-01-23 | End: 2023-01-30

## 2023-01-23 RX ORDER — CINACALCET 30 MG/1
30 TABLET, FILM COATED ORAL
Refills: 0 | Status: DISCONTINUED | OUTPATIENT
Start: 2023-01-23 | End: 2023-01-26

## 2023-01-23 RX ORDER — ACETAMINOPHEN 500 MG
1000 TABLET ORAL ONCE
Refills: 0 | Status: COMPLETED | OUTPATIENT
Start: 2023-01-23 | End: 2023-01-23

## 2023-01-23 RX ORDER — MORPHINE SULFATE 50 MG/1
2 CAPSULE, EXTENDED RELEASE ORAL EVERY 4 HOURS
Refills: 0 | Status: DISCONTINUED | OUTPATIENT
Start: 2023-01-23 | End: 2023-01-24

## 2023-01-23 RX ORDER — TAMSULOSIN HYDROCHLORIDE 0.4 MG/1
0.8 CAPSULE ORAL AT BEDTIME
Refills: 0 | Status: DISCONTINUED | OUTPATIENT
Start: 2023-01-23 | End: 2023-01-30

## 2023-01-23 RX ORDER — SUCRALFATE 1 G
1 TABLET ORAL
Refills: 0 | Status: DISCONTINUED | OUTPATIENT
Start: 2023-01-23 | End: 2023-01-30

## 2023-01-23 RX ORDER — AMLODIPINE BESYLATE 2.5 MG/1
10 TABLET ORAL DAILY
Refills: 0 | Status: DISCONTINUED | OUTPATIENT
Start: 2023-01-23 | End: 2023-01-30

## 2023-01-23 RX ORDER — SODIUM CHLORIDE 9 MG/ML
1000 INJECTION INTRAMUSCULAR; INTRAVENOUS; SUBCUTANEOUS ONCE
Refills: 0 | Status: COMPLETED | OUTPATIENT
Start: 2023-01-23 | End: 2023-01-23

## 2023-01-23 RX ORDER — HEPARIN SODIUM 5000 [USP'U]/ML
5000 INJECTION INTRAVENOUS; SUBCUTANEOUS EVERY 12 HOURS
Refills: 0 | Status: DISCONTINUED | OUTPATIENT
Start: 2023-01-23 | End: 2023-01-24

## 2023-01-23 RX ORDER — PANTOPRAZOLE SODIUM 20 MG/1
40 TABLET, DELAYED RELEASE ORAL
Refills: 0 | Status: DISCONTINUED | OUTPATIENT
Start: 2023-01-23 | End: 2023-01-30

## 2023-01-23 RX ORDER — OXYCODONE HYDROCHLORIDE 5 MG/1
5 TABLET ORAL EVERY 4 HOURS
Refills: 0 | Status: DISCONTINUED | OUTPATIENT
Start: 2023-01-23 | End: 2023-01-27

## 2023-01-23 RX ADMIN — SODIUM CHLORIDE 1000 MILLILITER(S): 9 INJECTION INTRAMUSCULAR; INTRAVENOUS; SUBCUTANEOUS at 18:58

## 2023-01-23 RX ADMIN — Medication 400 MILLIGRAM(S): at 19:45

## 2023-01-23 RX ADMIN — TAMSULOSIN HYDROCHLORIDE 0.8 MILLIGRAM(S): 0.4 CAPSULE ORAL at 21:54

## 2023-01-23 RX ADMIN — SODIUM CHLORIDE 1000 MILLILITER(S): 9 INJECTION INTRAMUSCULAR; INTRAVENOUS; SUBCUTANEOUS at 17:58

## 2023-01-23 RX ADMIN — CINACALCET 30 MILLIGRAM(S): 30 TABLET, FILM COATED ORAL at 21:54

## 2023-01-23 RX ADMIN — ATORVASTATIN CALCIUM 10 MILLIGRAM(S): 80 TABLET, FILM COATED ORAL at 21:54

## 2023-01-23 RX ADMIN — MORPHINE SULFATE 4 MILLIGRAM(S): 50 CAPSULE, EXTENDED RELEASE ORAL at 17:58

## 2023-01-23 RX ADMIN — MORPHINE SULFATE 4 MILLIGRAM(S): 50 CAPSULE, EXTENDED RELEASE ORAL at 18:22

## 2023-01-23 RX ADMIN — Medication 1000 MILLIGRAM(S): at 20:28

## 2023-01-23 NOTE — ED ADULT NURSE NOTE - OBJECTIVE STATEMENT
76 y/o M PMH CKD, hyperthyroidism, hyperparathyroidism, HLD, HTN, presenting to ED s/p mechanical trip and fall. Pt was at presurgical testing for thyroidectomy and bumped into scale and fell to floor. Endorsing left thigh pain and headache. Denies LOC and or ac use. Difficulty ambulating s/p fall. +DP pulses, limited ROM to LLE.  Denies CP, SOB, n/v/d, fevers, chills, abdominal pain, urinary symptoms, weakness, fatigue, numbness, tingling in upper and lower extremities, HA, blurry vision. VSS updated on plan of care. 78 y/o M PMH CKD, hyperthyroidism, hyperparathyroidism, HLD, HTN, presenting to ED s/p mechanical trip and fall. Pt was at presurgical testing for procedure on Monday and bumped into scale and fell to floor. Endorsing left thigh pain and headache. Denies LOC and or ac use. Difficulty ambulating s/p fall. +DP pulses, limited ROM to LLE. Able to flex and extend left foot but unable to raise LLE.  Denies CP, SOB, n/v/d, fevers, chills, abdominal pain, urinary symptoms, weakness, fatigue, numbness, tingling in upper and lower extremities, HA, blurry vision. VSS updated on plan of care.

## 2023-01-23 NOTE — H&P PST ADULT - PROBLEM SELECTOR PLAN 1
preop for parathyroidectomy with parathyroid hormone assay, possible thyroid lobectomy, possible paratracheal node dissection on 1/30/23  preop instructions given, pt verbalized understanding  pt will take Cinacalcet AM of surgery as prescribed   He will take prescribed omeprazole AM of surgery for GI prophylaxis   chlorhexidine wash provided  pt informed COVID testing must be done 72 hours prior to surgery  all lab results in chart from 1/10/23 & 1/12/23  medical evaluation & comparison EKG requested- pt has abnormal EKG and had recent fall at PST

## 2023-01-23 NOTE — ED PROVIDER NOTE - MUSCULOSKELETAL, MLM
No midline tenderness. FROM BUE, RLE. LLE no bruising or deformity, +NVI, pain to thigh with movement.

## 2023-01-23 NOTE — ED ADULT NURSE NOTE - BREATHING, MLM
63 year old F with PMH of COPD (current smoker) who presents to ED for dizziness and vomiting that began last Thursday (1wk ago). She reports being unable to ambulate due to the dizziness.  Stayed mostly in bed then finally called 911. She has also had persistent nausea and two episodes of vomiting this past week.  She has never had symptoms like this before.  She denies any SOB, chest pain, fevers/chills, abdominal pain, or cough.  She does endorse decreased PO intake over the past week due to the nausea and vomiting. Has not seen PMD in 5yrs, had c-scopy and mammo 4 yrs ago. Lives alone.  In the ED, vitals stable. Labs significant for hypokalemia and hypochloremia.  CT head revealed scattered lacunar infarcts (of indeterminate age).    #Acute CVA: Dizziness, imbalance, nausea/vomiting- likely due to lacunar infarct  -CT head showing multiple lacunar infarcts of indeterminate age  -< from: MR Head No Cont (05.06.20 @ 18:58) >  1.  Small focus of restricted diffusion along the medial margin of the right middle cerebellar peduncle. Diagnostic consideration include acute lacunar infarct as well as demyelination in this location.  2.  Chronic infarct in the left basal ganglia/corona radiata and multiple chronic lacunar infarcts within bilateral deep gray nuclei and the central sabrina.  -admit to stroke unit  -s/p aspirin 325 mg in the ED,neuro recommends aspirin 81 mg daily plus cilostazol  -started atorvastatin 80 mg  -CTA head and neck noted  -echo noted  -Speech/PT/OT eval  -smoking cessation discussed    #COPD  -follows with Dr. Romario Maria  -c/w albuterol prn  -smoking cessation    #New Dx of DM  -counselled  -exercise, wt loss  -Metformin 500 QD on d/c    Dispo - depending on PT recs but pt adamant that she does not want to go to SNF and want to go to [place].    #Progress Note Handoff  Pending (specify):  Consults____Clinical improvement and stability__x___Tests________PT___x_____  Pt/Family discussion: Pt informed and agrees with the current plan  Disposition: Home___vs SNF____x___    Discharge instructions discussed and patient knows when to seek immediate medical attention.  Patient has proper follow up.  All results discussed and patient aware they may require further work up.  Stressed importance of proper follow up.  Medications prescribed and changes discussed.  All questions and concerns from patient  addressed. Understanding of instructions verbalized.    Time spent in completing discharge process and coordinating care 45 minutes.    Discussed with housestaff, nursing, social work, neuro Spontaneous, unlabored and symmetrical

## 2023-01-23 NOTE — H&P PST ADULT - NSICDXPASTMEDICALHX_GEN_ALL_CORE_FT
PAST MEDICAL HISTORY:  Chronic kidney disease, unspecified CKD stage     History of hyperthyroidism     HTN (hypertension)     HTN (hypertension)     Hyperlipidemia     Hyperlipidemia     Hyperparathyroidism     Hypertension     OCD (obsessive compulsive disorder)     Prostate cancer      PAST MEDICAL HISTORY:  Chronic kidney disease, unspecified CKD stage     History of hyperthyroidism     HTN (hypertension)     Hyperlipidemia     Hyperlipidemia     Hyperparathyroidism     Hypertension     OCD (obsessive compulsive disorder)     Prostate cancer

## 2023-01-23 NOTE — ED PROVIDER NOTE - NS ED ATTENDING STATEMENT MOD
This was a shared visit with the [facility]. I reviewed and verified the documentation and independently performed the documented:

## 2023-01-23 NOTE — H&P PST ADULT - PROBLEM SELECTOR PLAN 6
pt tripped and fell while exiting the bathroom after PST visit  He was transferred via ambulance to ED for further evaluation after reporting left leg pain and hitting his head  surgical coordinator Anai made aware, email sent to Dr. Lundy.

## 2023-01-23 NOTE — ED PROVIDER NOTE - CLINICAL SUMMARY MEDICAL DECISION MAKING FREE TEXT BOX
77F c/o left thigh pain, mild head pain s/p trip and fall. As he was leaving pre surgical testing he bumped into a scale and fell. No LOC. No blood thinners. Difficulty walking afterwards due to pain.    PE VSS  Head at, nt. PERRL, EOMI  Neck supple FROMI, NT  Chest abd at  Ext FROM , pulses sensation intact. AT. No spinal tenderness or deformity.  Neuro intact, no deficits.  Imp- Fall. RO ICH, RO Fx. Plan - CT xray. tylenol.  Ortho FU.    I performed a history and physical exam of the patient and discussed their management with the advanced care provider. I reviewed the advanced care provider's note and agree with the documented findings and plan of care. My medical decision making and objective findings are found above.

## 2023-01-23 NOTE — H&P PST ADULT - MUSCULOSKELETAL
details… ROM intact/no calf tenderness/normal gait/strength 5/5 bilateral upper extremities/strength 5/5 bilateral lower extremities

## 2023-01-23 NOTE — H&P PST ADULT - ASSESSMENT
76 y/o male presents to [facility] preop for parathyroidectomy with parathyroid hormone assay, possible thyroid lobectomy, possible paratracheal node dissection. He was pt presented to his PCP reporting fatigue, bone pain, constipation, decreased appetite and visual disturbances. Lab results revealed a calcium of 14.2 and PTH of 585. Pt was hospitalized and started on Sensipar.  76 y/o male presents to [facility] preop for parathyroidectomy with parathyroid hormone assay, possible thyroid lobectomy, possible paratracheal node dissection. Pt presented to his PCP reporting fatigue, bone pain, constipation, decreased appetite and visual disturbances. Lab results revealed a calcium of 14.2 and PTH of 585. Pt was hospitalized and started on Sensipar.

## 2023-01-23 NOTE — ED ADULT NURSE REASSESSMENT NOTE - NS ED NURSE REASSESS COMMENT FT1
received report and assumed care of pt at change of shift. pt awake and alert. c/o pain to left hip. pt to be admitted and NPO after midnight for OR tomorrow. vs as charted. will continue to monitor

## 2023-01-23 NOTE — H&P ADULT - ASSESSMENT
77M with hx of prostate cancer (no tx), hyperthyroidism, CKD, HTN, HLD, r hypercalcemia (found to have parathyroid nodule),  hyperparathyroidism.planned for parathyroidectomy, and was getting preop labs tday, .c/o left thigh pain, mild head pain s/p trip and fall. As he was leaving pre surgical testing he bumped into a scale and fell. No LOC. No blood thinners.Difficulty walking afterwards due to pain.   In ED afebrile,  /82, calium levels normal, ortho consult called and is being admitted  left INtertrochanteric femur fracture with OP  CKD  HTN  HLD   hyperparathyroidism  Thyroid nodule   lacunar infarcts age indeterminate  h/o prostate ca  pain mangement, ortho consult, cardio and nephro, and endo consult    patient is hemodyanamically stable is intermediate risk, optimized

## 2023-01-23 NOTE — H&P ADULT - HISTORY OF PRESENT ILLNESS
77M with hx of prostate cancer (no tx), hyperthyroidism, CKD, HTN, HLD, r hypercalcemia (found to have parathyroid nodule),  hyperparathyroidism.planned for parathyroidectomy, and was getting preop labs tday, .c/o left thigh pain, mild head pain s/p trip and fall. As he was leaving pre surgical testing he bumped into a scale and fell. No LOC. No blood thinners.Difficulty walking afterwards due to pain.   In ED afebrile,  /82, calium levels normal, ortho consult called and is being admitted

## 2023-01-23 NOTE — ED PROVIDER NOTE - NSICDXPASTMEDICALHX_GEN_ALL_CORE_FT
PAST MEDICAL HISTORY:  Chronic kidney disease, unspecified CKD stage     History of hyperthyroidism     HTN (hypertension)     Hyperlipidemia     Hyperlipidemia     Hyperparathyroidism     Hypertension     OCD (obsessive compulsive disorder)     Prostate cancer

## 2023-01-23 NOTE — PATIENT PROFILE ADULT - FALL HARM RISK - HARM RISK INTERVENTIONS

## 2023-01-23 NOTE — H&P PST ADULT - NEGATIVE ENMT SYMPTOMS
no hearing difficulty/no ear pain/no tinnitus/no vertigo/no nasal congestion/no nose bleeds/no gum bleeding/no dry mouth/no throat pain

## 2023-01-23 NOTE — ED ADULT NURSE NOTE - CHIEF COMPLAINT QUOTE
patient is brought in by EMS from [facility], s/p tripped and fall, c/o left leg pain, no head trauma

## 2023-01-23 NOTE — H&P PST ADULT - NSICDXFAMILYHX_GEN_ALL_CORE_FT
FAMILY HISTORY:  Sibling  Still living? Unknown  FH: bladder cancer, Age at diagnosis: Age Unknown

## 2023-01-23 NOTE — H&P PST ADULT - HISTORY OF PRESENT ILLNESS
76 y/o male presents to [facility] preop for parathyroidectomy with parathyroid hormone assay, possible thyroid lobectomy, possible paratracheal node dissection. He was pt presented to his PCP reporting fatigue, bone pain, constipation, decreased appetite and visual disturbances. Lab results revealed a calcium of 14.2 and PTH of 585. Pt was hospitalized and started on Sensipar.  78 y/o male presents to [facility] preop for parathyroidectomy with parathyroid hormone assay, possible thyroid lobectomy, possible paratracheal node dissection. Pt presented to his PCP reporting fatigue, bone pain, constipation, decreased appetite and visual disturbances. Lab results revealed a calcium of 14.2 and PTH of 585. Pt was hospitalized and started on Sensipar.

## 2023-01-23 NOTE — ED ADULT NURSE NOTE - NSIMPLEMENTINTERV_GEN_ALL_ED
Implemented All Fall Risk Interventions:  [place] to call system. Call bell, personal items and telephone within reach. Instruct patient to call for assistance. Room bathroom lighting operational. Non-slip footwear when patient is off stretcher. Physically safe environment: no spills, clutter or unnecessary equipment. Stretcher in lowest position, wheels locked, appropriate side rails in place. Provide visual cue, wrist band, yellow gown, etc. Monitor gait and stability. Monitor for mental status changes and reorient to person, place, and time. Review medications for side effects contributing to fall risk. Reinforce activity limits and safety measures with patient and family.

## 2023-01-23 NOTE — ED ADULT TRIAGE NOTE - CHIEF COMPLAINT QUOTE
patient is brought in by EMS from [facility], s/p tripped and fall, c/o left leg pain, no head trauma

## 2023-01-23 NOTE — ED PROVIDER NOTE - CADM POA CENTRAL LINE
Subjective   History of Present Illness  85-year-old male presents as a transfer from outside facility.  Reports substernal chest pain feels like an ache in his chest with intermittent sharp pain.  He reports he has had this in the past.  The patient reports he has had multiple MIs in the past multiple stents and quadruple bypass.  His cardiologist is Dr. Germain.  Denies hemoptysis.  Review of Systems   All other systems reviewed and are negative.      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   • SOB (shortness of breath)      MAY BE AN ANIGNAL EQUIVALENT       No Known Allergies    Past Surgical History:   Procedure Laterality Date   • CARDIAC ELECTROPHYSIOLOGY PROCEDURE N/A 7/26/2017    Procedure: Pacemaker SC new;  Surgeon: Andrew Germain MD;  Location: [facility] CATH INVASIVE LOCATION;  Service:    • CHOLECYSTECTOMY     • CORONARY ARTERY BYPASS GRAFT     • HEMORRHOIDECTOMY     • HIP SURGERY     • JOINT REPLACEMENT         Family History   Problem Relation Age of Onset   • Coronary artery disease Mother    • Liver disease Father        Social History     Socioeconomic History   • Marital status:      Spouse name: Not on file   • Number of children: Not on file   • Years of education: Not on file   • Highest education level: Not on file   Tobacco Use   • Smoking status: Former Smoker   • Smokeless tobacco: Never Used   • Tobacco comment: quit 1984   Substance and Sexual Activity   • Alcohol use: No   • Drug use: No   • Sexual activity: Defer           Objective   Physical Exam   Constitutional: He is oriented to person, place, and time. He appears well-developed and well-nourished.   HENT:    Head: Normocephalic and atraumatic.   Eyes: Pupils are equal, round, and reactive to light. EOM are normal.   Neck: Normal range of motion. Neck supple.   Cardiovascular: Normal rate and regular rhythm.   Pulmonary/Chest: Effort normal.   Abdominal: Soft.   Musculoskeletal: Normal range of motion.   Neurological: He is alert and oriented to person, place, and time.   Skin: Skin is warm. Capillary refill takes less than 2 seconds.   Psychiatric: He has a normal mood and affect. His behavior is normal.   Nursing note and vitals reviewed.      Procedures           ED Course           Labs Reviewed   COMPREHENSIVE METABOLIC PANEL - Abnormal; Notable for the following components:       Result Value    Glucose 112 (*)     Creatinine 0.75 (*)     All other components within normal limits    Narrative:     GFR Normal >60  Chronic Kidney Disease <60  Kidney Failure <15     D-DIMER, QUANTITATIVE - Abnormal; Notable for the following components:    D-Dimer, Quantitative 0.70 (*)     All other components within normal limits    Narrative:     Reference Range is 0-0.50 mg/L FEU. However, results <0.50 mg/L FEU tends to rule out DVT or PE. Results >0.50 mg/L FEU are not useful in predicting absence or presence of DVT or PE.     CBC WITH AUTO DIFFERENTIAL - Abnormal; Notable for the following components:    MPV 12.5 (*)     Monocytes, Absolute 0.91 (*)     Eosinophils, Absolute 0.42 (*)     All other components within normal limits   LIPASE - Normal   BNP (IN-HOUSE) - Normal    Narrative:     Among patients with dyspnea, NT-proBNP is highly sensitive for the detection of acute congestive heart failure. In addition NT-proBNP of <300 pg/ml effectively rules out acute congestive heart failure with 99% negative predictive value.    Results may be falsely decreased if patient taking Biotin.     TROPONIN (IN-HOUSE) - Normal    Narrative:     Troponin T Reference Range:  <= 0.03 ng/mL-   Negative for AMI  >0.03 ng/mL-     Abnormal for  myocardial necrosis.  Clinicians would have to utilize clinical acumen, EKG, Troponin and serial changes to determine if it is an Acute Myocardial Infarction or myocardial injury due to an underlying chronic condition.       Results may be falsely decreased if patient taking Biotin.     RAINBOW DRAW    Narrative:     The following orders were created for panel order [place] Draw.  Procedure                               Abnormality         Status                     ---------                               -----------         ------                     Light Blue Top[[id number]]                                   Final result               Green Top (Gel)[[id number]]                                  Final result               Lavender Top[[id number]]                                     Final result               Red Top[[id number]]                                          Final result                 Please view results for these tests on the individual orders.   LIGHT BLUE TOP   GREEN TOP   LAVENDER TOP   RED TOP   CBC AND DIFFERENTIAL    Narrative:     The following orders were created for panel order CBC & Differential.  Procedure                               Abnormality         Status                     ---------                               -----------         ------                     CBC Auto Differential[[id number]]        Abnormal            Final result                 Please view results for these tests on the individual orders.     XR Chest 1 View    (Results Pending)                                         MDM  Number of Diagnoses or Management Options  Chest pain, unspecified type: new and requires workup  Diagnosis management comments: Admit to Dr. Germain and stress test in the morning.       Amount and/or Complexity of Data Reviewed  Clinical lab tests: reviewed and ordered  Tests in the radiology section of CPT®: ordered and reviewed  Tests in the medicine section of CPT®: reviewed and ordered    Risk  of Complications, Morbidity, and/or Mortality  Presenting problems: moderate  Diagnostic procedures: moderate  Management options: moderate    Patient Progress  Patient progress: stable      Final diagnoses:   Chest pain, unspecified type            Akbar Jarrell PA-C  02/06/20 0348     No

## 2023-01-23 NOTE — ED PROVIDER NOTE - OBJECTIVE STATEMENT
77F c/o left thigh pain, mild head pain s/p trip and fall. As he was leaving pre surgical testing he bumped into a scale and fell. No LOC. No blood thinners. Difficulty walking afterwards due to pain.

## 2023-01-24 ENCOUNTER — TRANSCRIPTION ENCOUNTER (OUTPATIENT)
Age: 78
End: 2023-01-24

## 2023-01-24 DIAGNOSIS — E05.90 THYROTOXICOSIS, UNSPECIFIED WITHOUT THYROTOXIC CRISIS OR STORM: ICD-10-CM

## 2023-01-24 LAB
ABO RH CONFIRMATION: SIGNIFICANT CHANGE UP
ALBUMIN SERPL ELPH-MCNC: 3 G/DL — LOW (ref 3.3–5)
ALP SERPL-CCNC: 66 U/L — SIGNIFICANT CHANGE UP (ref 30–120)
ALT FLD-CCNC: 35 U/L DA — SIGNIFICANT CHANGE UP (ref 10–60)
ANION GAP SERPL CALC-SCNC: 6 MMOL/L — SIGNIFICANT CHANGE UP (ref 5–17)
AST SERPL-CCNC: 15 U/L — SIGNIFICANT CHANGE UP (ref 10–40)
BASOPHILS # BLD AUTO: 0.06 K/UL — SIGNIFICANT CHANGE UP (ref 0–0.2)
BASOPHILS NFR BLD AUTO: 0.8 % — SIGNIFICANT CHANGE UP (ref 0–2)
BILIRUB SERPL-MCNC: 0.7 MG/DL — SIGNIFICANT CHANGE UP (ref 0.2–1.2)
BUN SERPL-MCNC: 19 MG/DL — SIGNIFICANT CHANGE UP (ref 7–23)
CALCIUM SERPL-MCNC: 8.8 MG/DL — SIGNIFICANT CHANGE UP (ref 8.4–10.5)
CHLORIDE SERPL-SCNC: 108 MMOL/L — SIGNIFICANT CHANGE UP (ref 96–108)
CHOLEST SERPL-MCNC: 119 MG/DL — SIGNIFICANT CHANGE UP
CO2 SERPL-SCNC: 26 MMOL/L — SIGNIFICANT CHANGE UP (ref 22–31)
CREAT SERPL-MCNC: 1.55 MG/DL — HIGH (ref 0.5–1.3)
EGFR: 46 ML/MIN/1.73M2 — LOW
EOSINOPHIL # BLD AUTO: 0.25 K/UL — SIGNIFICANT CHANGE UP (ref 0–0.5)
EOSINOPHIL NFR BLD AUTO: 3.2 % — SIGNIFICANT CHANGE UP (ref 0–6)
GLUCOSE SERPL-MCNC: 96 MG/DL — SIGNIFICANT CHANGE UP (ref 70–99)
HCT VFR BLD CALC: 26.2 % — LOW (ref 39–50)
HDLC SERPL-MCNC: 54 MG/DL — SIGNIFICANT CHANGE UP
HGB BLD-MCNC: 8.6 G/DL — LOW (ref 13–17)
IMM GRANULOCYTES NFR BLD AUTO: 0.4 % — SIGNIFICANT CHANGE UP (ref 0–0.9)
LIPID PNL WITH DIRECT LDL SERPL: 54 MG/DL — SIGNIFICANT CHANGE UP
LYMPHOCYTES # BLD AUTO: 1.1 K/UL — SIGNIFICANT CHANGE UP (ref 1–3.3)
LYMPHOCYTES # BLD AUTO: 13.9 % — SIGNIFICANT CHANGE UP (ref 13–44)
MCHC RBC-ENTMCNC: 28.4 PG — SIGNIFICANT CHANGE UP (ref 27–34)
MCHC RBC-ENTMCNC: 32.8 GM/DL — SIGNIFICANT CHANGE UP (ref 32–36)
MCV RBC AUTO: 86.5 FL — SIGNIFICANT CHANGE UP (ref 80–100)
MONOCYTES # BLD AUTO: 0.72 K/UL — SIGNIFICANT CHANGE UP (ref 0–0.9)
MONOCYTES NFR BLD AUTO: 9.1 % — SIGNIFICANT CHANGE UP (ref 2–14)
NEUTROPHILS # BLD AUTO: 5.73 K/UL — SIGNIFICANT CHANGE UP (ref 1.8–7.4)
NEUTROPHILS NFR BLD AUTO: 72.6 % — SIGNIFICANT CHANGE UP (ref 43–77)
NON HDL CHOLESTEROL: 65 MG/DL — SIGNIFICANT CHANGE UP
NRBC # BLD: 0 /100 WBCS — SIGNIFICANT CHANGE UP (ref 0–0)
PLATELET # BLD AUTO: 163 K/UL — SIGNIFICANT CHANGE UP (ref 150–400)
POTASSIUM SERPL-MCNC: 4 MMOL/L — SIGNIFICANT CHANGE UP (ref 3.5–5.3)
POTASSIUM SERPL-SCNC: 4 MMOL/L — SIGNIFICANT CHANGE UP (ref 3.5–5.3)
PROT SERPL-MCNC: 5.8 G/DL — LOW (ref 6–8.3)
RBC # BLD: 3.03 M/UL — LOW (ref 4.2–5.8)
RBC # FLD: 15.1 % — HIGH (ref 10.3–14.5)
SODIUM SERPL-SCNC: 140 MMOL/L — SIGNIFICANT CHANGE UP (ref 135–145)
T3 SERPL-MCNC: 104 NG/DL — SIGNIFICANT CHANGE UP (ref 80–200)
T4 AB SER-ACNC: 6.1 UG/DL — SIGNIFICANT CHANGE UP (ref 4.6–12)
TRIGL SERPL-MCNC: 56 MG/DL — SIGNIFICANT CHANGE UP
TSH SERPL-MCNC: 0.49 UIU/ML — SIGNIFICANT CHANGE UP (ref 0.27–4.2)
WBC # BLD: 7.89 K/UL — SIGNIFICANT CHANGE UP (ref 3.8–10.5)
WBC # FLD AUTO: 7.89 K/UL — SIGNIFICANT CHANGE UP (ref 3.8–10.5)

## 2023-01-24 PROCEDURE — 27245 TREAT THIGH FRACTURE: CPT | Mod: LT

## 2023-01-24 PROCEDURE — 27245 TREAT THIGH FRACTURE: CPT | Mod: AS,LT

## 2023-01-24 DEVICE — SCREW LOKG 5X37.5MM: Type: IMPLANTABLE DEVICE | Site: LEFT | Status: FUNCTIONAL

## 2023-01-24 DEVICE — IMPLANTABLE DEVICE: Type: IMPLANTABLE DEVICE | Site: LEFT | Status: FUNCTIONAL

## 2023-01-24 DEVICE — K-WIRE STRYKER 3.2M X 450MM: Type: IMPLANTABLE DEVICE | Site: LEFT | Status: FUNCTIONAL

## 2023-01-24 DEVICE — STRYKER TROCHANTERIC NAIL 11MM X 180MM 125 DEGREE: Type: IMPLANTABLE DEVICE | Site: LEFT | Status: FUNCTIONAL

## 2023-01-24 DEVICE — K-WIRE STRYKER 3.2MM X 450MM: Type: IMPLANTABLE DEVICE | Site: LEFT | Status: FUNCTIONAL

## 2023-01-24 RX ORDER — MAGNESIUM HYDROXIDE 400 MG/1
30 TABLET, CHEWABLE ORAL DAILY
Refills: 0 | Status: DISCONTINUED | OUTPATIENT
Start: 2023-01-24 | End: 2023-01-30

## 2023-01-24 RX ORDER — OXYCODONE HYDROCHLORIDE 5 MG/1
10 TABLET ORAL
Refills: 0 | Status: DISCONTINUED | OUTPATIENT
Start: 2023-01-24 | End: 2023-01-30

## 2023-01-24 RX ORDER — HYDROMORPHONE HYDROCHLORIDE 2 MG/ML
0.25 INJECTION INTRAMUSCULAR; INTRAVENOUS; SUBCUTANEOUS
Refills: 0 | Status: DISCONTINUED | OUTPATIENT
Start: 2023-01-24 | End: 2023-01-30

## 2023-01-24 RX ORDER — SODIUM CHLORIDE 9 MG/ML
1000 INJECTION, SOLUTION INTRAVENOUS
Refills: 0 | Status: DISCONTINUED | OUTPATIENT
Start: 2023-01-24 | End: 2023-01-26

## 2023-01-24 RX ORDER — APREPITANT 80 MG/1
40 CAPSULE ORAL ONCE
Refills: 0 | Status: COMPLETED | OUTPATIENT
Start: 2023-01-24 | End: 2023-01-24

## 2023-01-24 RX ORDER — ONDANSETRON 8 MG/1
4 TABLET, FILM COATED ORAL EVERY 6 HOURS
Refills: 0 | Status: DISCONTINUED | OUTPATIENT
Start: 2023-01-24 | End: 2023-01-30

## 2023-01-24 RX ORDER — ACETAMINOPHEN 500 MG
1000 TABLET ORAL EVERY 8 HOURS
Refills: 0 | Status: DISCONTINUED | OUTPATIENT
Start: 2023-01-25 | End: 2023-01-30

## 2023-01-24 RX ORDER — OXYCODONE HYDROCHLORIDE 5 MG/1
5 TABLET ORAL
Refills: 0 | Status: DISCONTINUED | OUTPATIENT
Start: 2023-01-24 | End: 2023-01-30

## 2023-01-24 RX ORDER — ACETAMINOPHEN 500 MG
1000 TABLET ORAL ONCE
Refills: 0 | Status: COMPLETED | OUTPATIENT
Start: 2023-01-25 | End: 2023-01-25

## 2023-01-24 RX ORDER — TRAMADOL HYDROCHLORIDE 50 MG/1
50 TABLET ORAL EVERY 6 HOURS
Refills: 0 | Status: DISCONTINUED | OUTPATIENT
Start: 2023-01-24 | End: 2023-01-25

## 2023-01-24 RX ORDER — HYDROMORPHONE HYDROCHLORIDE 2 MG/ML
0.5 INJECTION INTRAMUSCULAR; INTRAVENOUS; SUBCUTANEOUS
Refills: 0 | Status: DISCONTINUED | OUTPATIENT
Start: 2023-01-24 | End: 2023-01-30

## 2023-01-24 RX ORDER — ACETAMINOPHEN 500 MG
1000 TABLET ORAL ONCE
Refills: 0 | Status: DISCONTINUED | OUTPATIENT
Start: 2023-01-24 | End: 2023-01-30

## 2023-01-24 RX ORDER — ENOXAPARIN SODIUM 100 MG/ML
40 INJECTION SUBCUTANEOUS EVERY 24 HOURS
Refills: 0 | Status: DISCONTINUED | OUTPATIENT
Start: 2023-01-25 | End: 2023-01-29

## 2023-01-24 RX ORDER — ONDANSETRON 8 MG/1
4 TABLET, FILM COATED ORAL ONCE
Refills: 0 | Status: DISCONTINUED | OUTPATIENT
Start: 2023-01-24 | End: 2023-01-30

## 2023-01-24 RX ORDER — CEFAZOLIN SODIUM 1 G
2000 VIAL (EA) INJECTION EVERY 8 HOURS
Refills: 0 | Status: COMPLETED | OUTPATIENT
Start: 2023-01-25 | End: 2023-01-25

## 2023-01-24 RX ORDER — POLYETHYLENE GLYCOL 3350 17 G/17G
17 POWDER, FOR SOLUTION ORAL AT BEDTIME
Refills: 0 | Status: DISCONTINUED | OUTPATIENT
Start: 2023-01-24 | End: 2023-01-30

## 2023-01-24 RX ORDER — SENNA PLUS 8.6 MG/1
2 TABLET ORAL AT BEDTIME
Refills: 0 | Status: DISCONTINUED | OUTPATIENT
Start: 2023-01-24 | End: 2023-01-30

## 2023-01-24 RX ORDER — CEFAZOLIN SODIUM 1 G
2000 VIAL (EA) INJECTION ONCE
Refills: 0 | Status: DISCONTINUED | OUTPATIENT
Start: 2023-01-24 | End: 2023-01-30

## 2023-01-24 RX ORDER — CHLORHEXIDINE GLUCONATE 213 G/1000ML
1 SOLUTION TOPICAL ONCE
Refills: 0 | Status: DISCONTINUED | OUTPATIENT
Start: 2023-01-24 | End: 2023-01-30

## 2023-01-24 RX ADMIN — SODIUM CHLORIDE 100 MILLILITER(S): 9 INJECTION INTRAMUSCULAR; INTRAVENOUS; SUBCUTANEOUS at 00:00

## 2023-01-24 RX ADMIN — Medication 20 MILLIGRAM(S): at 11:38

## 2023-01-24 RX ADMIN — APREPITANT 40 MILLIGRAM(S): 80 CAPSULE ORAL at 17:50

## 2023-01-24 RX ADMIN — OXYCODONE HYDROCHLORIDE 5 MILLIGRAM(S): 5 TABLET ORAL at 22:52

## 2023-01-24 RX ADMIN — Medication 100 MILLIEQUIVALENT(S): at 00:02

## 2023-01-24 RX ADMIN — PANTOPRAZOLE SODIUM 40 MILLIGRAM(S): 20 TABLET, DELAYED RELEASE ORAL at 11:42

## 2023-01-24 RX ADMIN — ATORVASTATIN CALCIUM 10 MILLIGRAM(S): 80 TABLET, FILM COATED ORAL at 22:43

## 2023-01-24 RX ADMIN — CINACALCET 30 MILLIGRAM(S): 30 TABLET, FILM COATED ORAL at 22:51

## 2023-01-24 RX ADMIN — MORPHINE SULFATE 2 MILLIGRAM(S): 50 CAPSULE, EXTENDED RELEASE ORAL at 02:12

## 2023-01-24 RX ADMIN — CINACALCET 30 MILLIGRAM(S): 30 TABLET, FILM COATED ORAL at 11:42

## 2023-01-24 RX ADMIN — OXYCODONE HYDROCHLORIDE 5 MILLIGRAM(S): 5 TABLET ORAL at 23:52

## 2023-01-24 RX ADMIN — MORPHINE SULFATE 2 MILLIGRAM(S): 50 CAPSULE, EXTENDED RELEASE ORAL at 02:30

## 2023-01-24 RX ADMIN — TAMSULOSIN HYDROCHLORIDE 0.8 MILLIGRAM(S): 0.4 CAPSULE ORAL at 22:43

## 2023-01-24 RX ADMIN — SODIUM CHLORIDE 100 MILLILITER(S): 9 INJECTION, SOLUTION INTRAVENOUS at 22:43

## 2023-01-24 RX ADMIN — AMLODIPINE BESYLATE 10 MILLIGRAM(S): 2.5 TABLET ORAL at 11:41

## 2023-01-24 RX ADMIN — Medication 100 MILLIEQUIVALENT(S): at 02:26

## 2023-01-24 RX ADMIN — SODIUM CHLORIDE 100 MILLILITER(S): 9 INJECTION INTRAMUSCULAR; INTRAVENOUS; SUBCUTANEOUS at 08:45

## 2023-01-24 NOTE — PROGRESS NOTE ADULT - SUBJECTIVE AND OBJECTIVE BOX
Patient is a 77y old  Male who presents with a chief complaint of s/p fall left hip pain (24 Jan 2023 09:25)      INTERVAL HPI/OVERNIGHT EVENTS:overnight events noted    Home Medications:  aspirin 81 mg oral tablet, chewable: 1 tab(s) orally once a day.last dose 1/23/23 (23 Jan 2023 17:52)  Carafate 1 g/10 mL oral suspension: 10 milliliter(s) orally 2 times a day (23 Jan 2023 17:52)  Flonase 50 mcg/inh nasal spray: 2 spray(s) in each nostril once a day (23 Jan 2023 17:52)  methIMAzole 5 mg oral tablet: 0.5 tab(s) orally once a day (23 Jan 2023 17:52)  omeprazole 20 mg oral delayed release capsule: 1 cap(s) orally once a day (23 Jan 2023 17:52)  PARoxetine 20 mg oral tablet: 1 tab(s) orally once a day  ***take with 30mg for TDD = 50mg*** (23 Jan 2023 17:52)  PARoxetine 30 mg oral tablet: 1 tab(s) orally once a day  ***take with 20mg for TDD = 50mg*** (23 Jan 2023 17:52)  pravastatin 20 mg oral tablet: 1 tab(s) orally once a day (at bedtime) (23 Jan 2023 17:52)  terazosin 5 mg oral capsule: 1 cap(s) orally once a day (at bedtime) (23 Jan 2023 17:52)      MEDICATIONS  (STANDING):  amLODIPine   Tablet 10 milliGRAM(s) Oral daily  atorvastatin 10 milliGRAM(s) Oral at bedtime  cinacalcet 30 milliGRAM(s) Oral two times a day  methimazole 5 milliGRAM(s) Oral daily  pantoprazole    Tablet 40 milliGRAM(s) Oral before breakfast  PARoxetine 20 milliGRAM(s) Oral daily  sodium chloride 0.9%. 1000 milliLiter(s) (100 mL/Hr) IV Continuous <Continuous>  sucralfate suspension 1 Gram(s) Oral two times a day  tamsulosin 0.8 milliGRAM(s) Oral at bedtime    MEDICATIONS  (PRN):  acetaminophen     Tablet .. 650 milliGRAM(s) Oral every 6 hours PRN Temp greater or equal to 38C (100.4F), Mild Pain (1 - 3)  morphine  - Injectable 2 milliGRAM(s) IV Push every 4 hours PRN Severe Pain (7 - 10)  oxyCODONE    IR 5 milliGRAM(s) Oral every 4 hours PRN Moderate Pain (4 - 6)      Allergies    No Known Allergies    Intolerances        REVIEW OF SYSTEMS:  CONSTITUTIONAL: No fever, weight loss, or fatigue  EYES: No eye pain, visual disturbances, or discharge  ENMT:  No difficulty hearing, tinnitus, vertigo; No sinus or throat pain  NECK: No pain or stiffness  BREASTS: No pain, masses, or nipple discharge  RESPIRATORY: No cough, wheezing, chills or hemoptysis; No shortness of breath  CARDIOVASCULAR: No chest pain, palpitations, dizziness, or leg swelling  GASTROINTESTINAL: No abdominal or epigastric pain. No nausea, vomiting, or hematemesis; No diarrhea or constipation. No melena or hematochezia.  GENITOURINARY: No dysuria, frequency, hematuria, or incontinence  NEUROLOGICAL: No headaches, memory loss, loss of strength, numbness, or tremors  SKIN: No itching, burning, rashes, or lesions   LYMPH NODES: No enlarged glands  ENDOCRINE: No heat or cold intolerance; No hair loss  MUSCULOSKELETAL: hip pain  Vital Signs Last 24 Hrs  T(C): 37.3 (24 Jan 2023 08:00), Max: 37.3 (24 Jan 2023 08:00)  T(F): 99.2 (24 Jan 2023 08:00), Max: 99.2 (24 Jan 2023 08:00)  HR: 90 (24 Jan 2023 08:00) (86 - 100)  BP: 154/75 (24 Jan 2023 08:00) (128/80 - 166/83)  BP(mean): 96 (23 Jan 2023 14:18) (96 - 96)  RR: 18 (24 Jan 2023 08:00) (15 - 18)  SpO2: 97% (24 Jan 2023 08:00) (97% - 100%)    Parameters below as of 24 Jan 2023 08:00  Patient On (Oxygen Delivery Method): room air        PHYSICAL EXAM:  GENERAL: NAD, well-groomed, well-developed  HEAD:  Atraumatic, Normocephalic  EYES: EOMI, PERRLA, conjunctiva and sclera clear  ENMT: Moist mucous membranes,   NECK: Supple, No JVD, Normal thyroid  NERVOUS SYSTEM:  Alert & Oriented X3, non focal  CHEST/LUNG: Clear to percussion bilaterally; No rales, rhonchi, wheezing, or rubs  HEART: Regular rate and rhythm; No murmurs, rubs, or gallops  ABDOMEN: Soft, Nontender, Nondistended; Bowel sounds present  EXTREMITIES:ROM limited    LABS:                        8.6    7.89  )-----------( 163      ( 24 Jan 2023 08:21 )             26.2     01-24    140  |  108  |  19  ----------------------------<  96  4.0   |  26  |  1.55<H>    Ca    8.8      24 Jan 2023 08:21    TPro  5.8<L>  /  Alb  3.0<L>  /  TBili  0.7  /  DBili  x   /  AST  15  /  ALT  35  /  AlkPhos  66  01-24    PT/INR - ( 23 Jan 2023 17:40 )   PT: 13.5 sec;   INR: 1.17 ratio         PTT - ( 23 Jan 2023 17:40 )  PTT:29.1 sec    CAPILLARY BLOOD GLUCOSE              I&O's Summary      RADIOLOGY & ADDITIONAL TESTS:    Imaging Personally Reviewed:  [x ] YES  [ ] NO    Consultant(s) Notes Reviewed:  [x ] YES  [ ] NO    Care Discussed with Consultants/Other Providers [ x] YES  [ ] NO

## 2023-01-24 NOTE — CARE COORDINATION ASSESSMENT. - OTHER PERTINENT DISCHARGE PLANNING INFORMATION:
+SW consult Legal Help: fall   Legal Help marked as completed and patient declined any information at this time just wants to get surgery done and go home

## 2023-01-24 NOTE — CARE COORDINATION ASSESSMENT. - OTHER PERTINENT COPING/STRESS INFORMATION
wants to go home post op and get home care dc packet provided and he wants [facility] home care wife will be home she works from home, patient works for [employer]

## 2023-01-24 NOTE — BRIEF OPERATIVE NOTE - NSICDXBRIEFPROCEDURE_GEN_ALL_CORE_FT
PROCEDURES:  ORIF, fracture, femur, intertrochanteric, using Gamma nail 24-Jan-2023 20:22:33 Left Hemanth Gunn

## 2023-01-24 NOTE — CARE COORDINATION ASSESSMENT. - CURRENT MENTAL STATUS/COGNITIVE FUNCTIONING
alert/oriented to person/oriented to place/oriented to time/oriented to situation/recall memory is intact/recent memory is intact/remote memory is intact/behavior seems appropriate to situation

## 2023-01-24 NOTE — PATIENT CHOICE NOTE. - NSPTCHOICESTATE_GEN_ALL_CORE

## 2023-01-24 NOTE — PRE-OP CHECKLIST - HEART RATE (BEATS/MIN)
98 Complex Repair And Double Advancement Flap Text: The defect edges were debeveled with a #15 scalpel blade.  The primary defect was closed partially with a complex linear closure.  Given the location of the remaining defect, shape of the defect and the proximity to free margins a double advancement flap was deemed most appropriate for complete closure of the defect.  Using a sterile surgical marker, an appropriate advancement flap was drawn incorporating the defect and placing the expected incisions within the relaxed skin tension lines where possible.    The area thus outlined was incised deep to adipose tissue with a #15 scalpel blade.  The skin margins were undermined to an appropriate distance in all directions utilizing iris scissors. 103

## 2023-01-24 NOTE — PRE-OP CHECKLIST - SITE MARKED BY ANESTHESIOLOGIST
Hydroxyzine Counseling: Patient advised that the medication is sedating and not to drive a car after taking this medication.  Patient informed of potential adverse effects including but not limited to dry mouth, urinary retention, and blurry vision.  The patient verbalized understanding of the proper use and possible adverse effects of hydroxyzine.  All of the patient's questions and concerns were addressed. yes

## 2023-01-24 NOTE — CONSULT NOTE ADULT - ASSESSMENT
The patient is a 77 year old male with a history of HTN, HL, CKD, hyperparathyroidism, prostate cancer who presents with a fall, found to have a hip fracture.    Plan:  - ECG with LVH related abnormalities  - CXR with clear lungs  - Continue amlodipine 10 mg daily  - There are no active cardiac issues. The patient is at intermediate risk for cardiac events for an intermediate risk surgery. He is optimized to proceed for surgery without additional cardiac testing.

## 2023-01-24 NOTE — CARE COORDINATION ASSESSMENT. - NSCAREPROVIDERS_GEN_ALL_CORE_FT
CARE PROVIDERS:  Accepting Physician: Gisele Cuevas  Admitting: Gisele Cuevas  Attending: Gisele Cuevas  Consultant: Remy Ruff  Consultant: Moustapha Anthony  Consultant: Perlman, Craig  Consultant: Jatin Quesada  Covering Team: Caesar Pearl  ED ACP: Elle Chun  ED Attending: Malachi Goode  ED Nurse: Miya Elias  Infection Control: Yael Hobbs  Nurse: Amna Braxton  Nurse: Gunjan Rodas  Nurse: Nettie Mendez  Nurse: Gloria Kapoor  Nurse: Asya Velasquez  Nurse: Manuel Nelson  Outpatient Provider: Ron Mahajan  Outpatient Provider: Ron Mahajan  Outpatient Provider: Anisha Martinez  Override: Manuel Nelson  Override: aTra uHff  PCA/Nursing Assistant: Abril Duggan  Primary Team: Esthela Jacobo  Primary Team: Miranda Galloway  Primary Team: Amanda Rojas  Primary Team: Amos Barkley  Primary Team: Gisele Cuevas  Primary Team: Jeremiah Polo  Primary Team: Ian Henry  Registered Dietitian: Kristyn Hastings  Research: Ian Zurita  : Kaylynn Jackson// Supp. Assoc.: Tara Huff// Supp. Assoc.: Erin Warren

## 2023-01-24 NOTE — BRIEF OPERATIVE NOTE - ASSISTANT(S)
DIDIER Gunn PA 1st assist
PAST MEDICAL HISTORY:  Impingement syndrome of left shoulder     Obesity

## 2023-01-24 NOTE — CARE COORDINATION ASSESSMENT. - OTHER PERTINENT REFERRAL INFORMATION
from home was at [facility] for para thyroid surgery and came out of bathroom and tripped over a scale

## 2023-01-24 NOTE — CARE COORDINATION ASSESSMENT. - NSPASTMEDSURGHISTORY_GEN_ALL_CORE_FT
PAST MEDICAL & SURGICAL HISTORY:  Hyperlipidemia      Hypertension      Chronic kidney disease, unspecified CKD stage      Prostate cancer      OCD (obsessive compulsive disorder)      History of hyperthyroidism      Hyperlipidemia      HTN (hypertension)      Hyperparathyroidism      H/O endoscopy      H/O colonoscopy

## 2023-01-24 NOTE — CARE COORDINATION ASSESSMENT. - SOCIAL WORK INTERVENTIONS
OR today with Dr Anthony then hoping for home with home care wife works from home/coordination of care/referral to care at home/supportive counseling: patient

## 2023-01-25 ENCOUNTER — TRANSCRIPTION ENCOUNTER (OUTPATIENT)
Age: 78
End: 2023-01-25

## 2023-01-25 LAB
ALBUMIN SERPL ELPH-MCNC: 2.9 G/DL — LOW (ref 3.3–5)
ALP SERPL-CCNC: 68 U/L — SIGNIFICANT CHANGE UP (ref 30–120)
ALT FLD-CCNC: 29 U/L DA — SIGNIFICANT CHANGE UP (ref 10–60)
ANION GAP SERPL CALC-SCNC: 8 MMOL/L — SIGNIFICANT CHANGE UP (ref 5–17)
AST SERPL-CCNC: 18 U/L — SIGNIFICANT CHANGE UP (ref 10–40)
BASOPHILS # BLD AUTO: 0 K/UL — SIGNIFICANT CHANGE UP (ref 0–0.2)
BASOPHILS NFR BLD AUTO: 0 % — SIGNIFICANT CHANGE UP (ref 0–2)
BILIRUB SERPL-MCNC: 0.4 MG/DL — SIGNIFICANT CHANGE UP (ref 0.2–1.2)
BUN SERPL-MCNC: 20 MG/DL — SIGNIFICANT CHANGE UP (ref 7–23)
CALCIUM SERPL-MCNC: 8.7 MG/DL — SIGNIFICANT CHANGE UP (ref 8.4–10.5)
CHLORIDE SERPL-SCNC: 105 MMOL/L — SIGNIFICANT CHANGE UP (ref 96–108)
CO2 SERPL-SCNC: 24 MMOL/L — SIGNIFICANT CHANGE UP (ref 22–31)
CORTICOSTEROID BINDING GLOBULIN RESULT: 1.3 MG/DL — LOW
CORTIS F/TOTAL MFR SERPL: 36 % — SIGNIFICANT CHANGE UP
CORTIS SERPL-MCNC: 13 UG/DL — SIGNIFICANT CHANGE UP
CORTISOL, FREE RESULT: 4.7 UG/DL — HIGH
CREAT SERPL-MCNC: 1.88 MG/DL — HIGH (ref 0.5–1.3)
EGFR: 37 ML/MIN/1.73M2 — LOW
EOSINOPHIL # BLD AUTO: 0 K/UL — SIGNIFICANT CHANGE UP (ref 0–0.5)
EOSINOPHIL NFR BLD AUTO: 0 % — SIGNIFICANT CHANGE UP (ref 0–6)
GLUCOSE SERPL-MCNC: 187 MG/DL — HIGH (ref 70–99)
HCT VFR BLD CALC: 28.5 % — LOW (ref 39–50)
HGB BLD-MCNC: 9.4 G/DL — LOW (ref 13–17)
IMM GRANULOCYTES NFR BLD AUTO: 0.5 % — SIGNIFICANT CHANGE UP (ref 0–0.9)
LYMPHOCYTES # BLD AUTO: 0.36 K/UL — LOW (ref 1–3.3)
LYMPHOCYTES # BLD AUTO: 3.7 % — LOW (ref 13–44)
MCHC RBC-ENTMCNC: 28.4 PG — SIGNIFICANT CHANGE UP (ref 27–34)
MCHC RBC-ENTMCNC: 33 GM/DL — SIGNIFICANT CHANGE UP (ref 32–36)
MCV RBC AUTO: 86.1 FL — SIGNIFICANT CHANGE UP (ref 80–100)
MONOCYTES # BLD AUTO: 0.34 K/UL — SIGNIFICANT CHANGE UP (ref 0–0.9)
MONOCYTES NFR BLD AUTO: 3.5 % — SIGNIFICANT CHANGE UP (ref 2–14)
NEUTROPHILS # BLD AUTO: 9 K/UL — HIGH (ref 1.8–7.4)
NEUTROPHILS NFR BLD AUTO: 92.3 % — HIGH (ref 43–77)
NRBC # BLD: 0 /100 WBCS — SIGNIFICANT CHANGE UP (ref 0–0)
PLATELET # BLD AUTO: 146 K/UL — LOW (ref 150–400)
POTASSIUM SERPL-MCNC: 4.4 MMOL/L — SIGNIFICANT CHANGE UP (ref 3.5–5.3)
POTASSIUM SERPL-SCNC: 4.4 MMOL/L — SIGNIFICANT CHANGE UP (ref 3.5–5.3)
PROT SERPL-MCNC: 6.1 G/DL — SIGNIFICANT CHANGE UP (ref 6–8.3)
RBC # BLD: 3.31 M/UL — LOW (ref 4.2–5.8)
RBC # FLD: 14.6 % — HIGH (ref 10.3–14.5)
SODIUM SERPL-SCNC: 137 MMOL/L — SIGNIFICANT CHANGE UP (ref 135–145)
WBC # BLD: 9.75 K/UL — SIGNIFICANT CHANGE UP (ref 3.8–10.5)
WBC # FLD AUTO: 9.75 K/UL — SIGNIFICANT CHANGE UP (ref 3.8–10.5)

## 2023-01-25 RX ORDER — SENNA PLUS 8.6 MG/1
2 TABLET ORAL
Qty: 0 | Refills: 0 | DISCHARGE
Start: 2023-01-25

## 2023-01-25 RX ORDER — POLYETHYLENE GLYCOL 3350 17 G/17G
17 POWDER, FOR SOLUTION ORAL
Qty: 0 | Refills: 0 | DISCHARGE
Start: 2023-01-25

## 2023-01-25 RX ORDER — CEFAZOLIN SODIUM 1 G
2000 VIAL (EA) INJECTION ONCE
Refills: 0 | Status: COMPLETED | OUTPATIENT
Start: 2023-01-25 | End: 2023-01-25

## 2023-01-25 RX ORDER — ACETAMINOPHEN 500 MG
2 TABLET ORAL
Qty: 0 | Refills: 0 | DISCHARGE
Start: 2023-01-25

## 2023-01-25 RX ORDER — OXYCODONE HYDROCHLORIDE 5 MG/1
1 TABLET ORAL
Qty: 42 | Refills: 0
Start: 2023-01-25 | End: 2023-01-31

## 2023-01-25 RX ADMIN — Medication 400 MILLIGRAM(S): at 01:41

## 2023-01-25 RX ADMIN — Medication 1000 MILLIGRAM(S): at 17:18

## 2023-01-25 RX ADMIN — Medication 100 MILLIGRAM(S): at 02:23

## 2023-01-25 RX ADMIN — SODIUM CHLORIDE 100 MILLILITER(S): 9 INJECTION, SOLUTION INTRAVENOUS at 21:40

## 2023-01-25 RX ADMIN — ENOXAPARIN SODIUM 40 MILLIGRAM(S): 100 INJECTION SUBCUTANEOUS at 08:37

## 2023-01-25 RX ADMIN — TAMSULOSIN HYDROCHLORIDE 0.8 MILLIGRAM(S): 0.4 CAPSULE ORAL at 21:40

## 2023-01-25 RX ADMIN — Medication 1000 MILLIGRAM(S): at 05:52

## 2023-01-25 RX ADMIN — OXYCODONE HYDROCHLORIDE 5 MILLIGRAM(S): 5 TABLET ORAL at 13:15

## 2023-01-25 RX ADMIN — Medication 1000 MILLIGRAM(S): at 21:40

## 2023-01-25 RX ADMIN — Medication 1000 MILLIGRAM(S): at 21:49

## 2023-01-25 RX ADMIN — Medication 30 MILLIGRAM(S): at 12:32

## 2023-01-25 RX ADMIN — PANTOPRAZOLE SODIUM 40 MILLIGRAM(S): 20 TABLET, DELAYED RELEASE ORAL at 05:50

## 2023-01-25 RX ADMIN — CINACALCET 30 MILLIGRAM(S): 30 TABLET, FILM COATED ORAL at 05:49

## 2023-01-25 RX ADMIN — OXYCODONE HYDROCHLORIDE 5 MILLIGRAM(S): 5 TABLET ORAL at 12:14

## 2023-01-25 RX ADMIN — ATORVASTATIN CALCIUM 10 MILLIGRAM(S): 80 TABLET, FILM COATED ORAL at 21:41

## 2023-01-25 RX ADMIN — Medication 20 MILLIGRAM(S): at 12:14

## 2023-01-25 RX ADMIN — Medication 100 MILLIGRAM(S): at 12:52

## 2023-01-25 RX ADMIN — SODIUM CHLORIDE 100 MILLILITER(S): 9 INJECTION, SOLUTION INTRAVENOUS at 08:32

## 2023-01-25 RX ADMIN — Medication 1000 MILLIGRAM(S): at 01:51

## 2023-01-25 RX ADMIN — Medication 1 GRAM(S): at 17:18

## 2023-01-25 RX ADMIN — Medication 1000 MILLIGRAM(S): at 17:34

## 2023-01-25 RX ADMIN — AMLODIPINE BESYLATE 10 MILLIGRAM(S): 2.5 TABLET ORAL at 05:49

## 2023-01-25 RX ADMIN — Medication 1000 MILLIGRAM(S): at 05:50

## 2023-01-25 RX ADMIN — CINACALCET 30 MILLIGRAM(S): 30 TABLET, FILM COATED ORAL at 17:40

## 2023-01-25 NOTE — DISCHARGE NOTE PROVIDER - NSDCMRMEDTOKEN_GEN_ALL_CORE_FT
amLODIPine 10 mg oral tablet: 1 tab(s) orally once a day  aspirin 81 mg oral tablet, chewable: 1 tab(s) orally once a day.last dose 1/23/23  Carafate 1 g/10 mL oral suspension: 10 milliliter(s) orally 2 times a day  cinacalcet 30 mg oral tablet: 1 tab(s) orally 2 times a day  Flonase 50 mcg/inh nasal spray: 2 spray(s) in each nostril once a day  methIMAzole 5 mg oral tablet: 0.5 tab(s) orally once a day  omeprazole 20 mg oral delayed release capsule: 1 cap(s) orally once a day  oxyCODONE 5 mg oral tablet: 1 tab(s) orally every 4 hours, As Needed  -for severe pain MDD:6    Reference #: [id number]  PARoxetine 20 mg oral tablet: 1 tab(s) orally once a day  ***take with 30mg for TDD = 50mg***  PARoxetine 30 mg oral tablet: 1 tab(s) orally once a day  ***take with 20mg for TDD = 50mg***  pravastatin 20 mg oral tablet: 1 tab(s) orally once a day (at bedtime)  terazosin 5 mg oral capsule: 1 cap(s) orally once a day (at bedtime)   acetaminophen 500 mg oral tablet: 2 tab(s) orally every 8 hours  amLODIPine 10 mg oral tablet: 1 tab(s) orally once a day  Carafate 1 g/10 mL oral suspension: 10 milliliter(s) orally 2 times a day  cinacalcet 30 mg oral tablet: 1 tab(s) orally 2 times a day  Ecotrin Adult Low Strength 81 mg oral delayed release tablet: 1 tab(s) orally every 12 hours x 4 weeks  Flonase 50 mcg/inh nasal spray: 2 spray(s) in each nostril once a day  methIMAzole 5 mg oral tablet: 0.5 tab(s) orally once a day  metoprolol succinate 25 mg oral tablet, extended release: 1 tab(s) orally once a day  Multiple Vitamins oral tablet: 1 tab(s) orally once a day  omeprazole 20 mg oral delayed release capsule: 1 cap(s) orally once a day  oxyCODONE 5 mg oral tablet: 1 tab(s) orally every 4 hours, As Needed  -for severe pain MDD:6    Reference #: [id number]  PARoxetine 20 mg oral tablet: 1 tab(s) orally once a day  ***take with 30mg for TDD = 50mg***  PARoxetine 30 mg oral tablet: 1 tab(s) orally once a day  ***take with 20mg for TDD = 50mg***  polyethylene glycol 3350 oral powder for reconstitution: 17 gram(s) orally once a day (at bedtime)  pravastatin 20 mg oral tablet: 1 tab(s) orally once a day (at bedtime)  senna leaf extract oral tablet: 2 tab(s) orally once a day (at bedtime)  terazosin 5 mg oral capsule: 1 cap(s) orally once a day (at bedtime)   acetaminophen 500 mg oral tablet: 2 tab(s) orally every 8 hours  aluminum hydroxide-magnesium hydroxide 200 mg-200 mg/5 mL oral suspension: 30 milliliter(s) orally every 4 hours, As needed, Dyspepsia  amLODIPine 10 mg oral tablet: 1 tab(s) orally once a day  Carafate 1 g/10 mL oral suspension: 10 milliliter(s) orally 2 times a day  cinacalcet 30 mg oral tablet: 1 tab(s) orally 2 times a day  Ecotrin Adult Low Strength 81 mg oral delayed release tablet: 1 tab(s) orally every 12 hours x 4 weeks  Flonase 50 mcg/inh nasal spray: 2 spray(s) in each nostril once a day  methIMAzole 5 mg oral tablet: 0.5 tab(s) orally once a day  metoprolol succinate 25 mg oral tablet, extended release: 1 tab(s) orally once a day  Multiple Vitamins oral tablet: 1 tab(s) orally once a day  omeprazole 20 mg oral delayed release capsule: 1 cap(s) orally once a day  oxyCODONE 5 mg oral tablet: 1 tab(s) orally every 4 hours, As Needed  -for severe pain MDD:6    Reference #: [id number]  PARoxetine 20 mg oral tablet: 1 tab(s) orally once a day  ***take with 30mg for TDD = 50mg***  PARoxetine 30 mg oral tablet: 1 tab(s) orally once a day  ***take with 20mg for TDD = 50mg***  polyethylene glycol 3350 oral powder for reconstitution: 17 gram(s) orally once a day (at bedtime)  pravastatin 20 mg oral tablet: 1 tab(s) orally once a day (at bedtime)  senna leaf extract oral tablet: 2 tab(s) orally once a day (at bedtime)  terazosin 5 mg oral capsule: 1 cap(s) orally once a day (at bedtime)

## 2023-01-25 NOTE — SOCIAL WORK PROGRESS NOTE - NSSWPROGRESSNOTE_GEN_ALL_CORE
Per therapy recommending rehab. I met with patient, wife and daughter bedside. Patient needs to have parathryoid surgery ( he was at [facility] for this surgery when he fell) and the surgeon does NOT want him in any rehab. Wants him home. I explained recommendations and need for additional help at home. Per Dr Cuevas not stable for today since he had surgery yesterday. Family wanted him to stay until Friday and then home with HC. I explained to them need for 24/7 assist and may need to have 2 people home with him. Patient declined referral to rehab and wanted to see how well he does on Thursday.

## 2023-01-25 NOTE — PROGRESS NOTE ADULT - SUBJECTIVE AND OBJECTIVE BOX
Chief Complaint: Fall    Interval Events: Underwent surgery yesterday.    Review of Systems:  General: No fevers, chills, weight gain  Skin: No rashes, color changes  Cardiovascular: No chest pain, orthopnea  Respiratory: No shortness of breath, cough  Gastrointestinal: No nausea, abdominal pain  Genitourinary: No incontinence, pain with urination  Musculoskeletal: No pain, swelling, decreased range of motion  Neurological: No headache, weakness  Psychiatric: No depression, anxiety  Endocrine: No weight gain, increased thirst  All other systems are comprehensively negative.    Physical Exam:  Vitals:        Vital Signs Last 24 Hrs  T(C): 36.7 (25 Jan 2023 07:41), Max: 37.3 (24 Jan 2023 17:30)  T(F): 98 (25 Jan 2023 07:41), Max: 99.1 (24 Jan 2023 17:30)  HR: 94 (25 Jan 2023 07:41) (84 - 120)  BP: 112/65 (25 Jan 2023 07:41) (108/70 - 152/81)  BP(mean): --  RR: 18 (25 Jan 2023 07:41) (14 - 23)  SpO2: 95% (25 Jan 2023 07:41) (91% - 99%)  Parameters below as of 25 Jan 2023 07:41  Patient On (Oxygen Delivery Method): room air  General: NAD  HEENT: MMM  Neck: No JVD, no carotid bruit  Lungs: CTAB  CV: RRR, nl S1/S2, no M/R/G  Abdomen: S/NT/ND, +BS  Extremities: No LE edema, no cyanosis  Neuro: AAOx3, non-focal  Skin: No rash    Labs:                        9.4    9.75  )-----------( 146      ( 25 Jan 2023 06:00 )             28.5     01-25    137  |  105  |  20  ----------------------------<  187<H>  4.4   |  24  |  1.88<H>    Ca    8.7      25 Jan 2023 06:00    TPro  6.1  /  Alb  2.9<L>  /  TBili  0.4  /  DBili  x   /  AST  18  /  ALT  29  /  AlkPhos  68  01-25        PT/INR - ( 23 Jan 2023 17:40 )   PT: 13.5 sec;   INR: 1.17 ratio         PTT - ( 23 Jan 2023 17:40 )  PTT:29.1 sec

## 2023-01-25 NOTE — DISCHARGE NOTE PROVIDER - CARE PROVIDERS DIRECT ADDRESSES
[email or web address]

## 2023-01-25 NOTE — PROGRESS NOTE ADULT - SUBJECTIVE AND OBJECTIVE BOX
Patient is a 77y old  Male who presents with a chief complaint of s/p fall left hip pain (25 Jan 2023 09:01)      INTERVAL HPI/OVERNIGHT EVENTS:overnight events noted    Home Medications:  aspirin 81 mg oral tablet, chewable: 1 tab(s) orally once a day.last dose 1/23/23 (24 Jan 2023 17:56)  Carafate 1 g/10 mL oral suspension: 10 milliliter(s) orally 2 times a day (24 Jan 2023 17:56)  Flonase 50 mcg/inh nasal spray: 2 spray(s) in each nostril once a day (24 Jan 2023 17:56)  methIMAzole 5 mg oral tablet: 0.5 tab(s) orally once a day (24 Jan 2023 17:56)  omeprazole 20 mg oral delayed release capsule: 1 cap(s) orally once a day (24 Jan 2023 17:56)  PARoxetine 20 mg oral tablet: 1 tab(s) orally once a day  ***take with 30mg for TDD = 50mg*** (24 Jan 2023 17:56)  PARoxetine 30 mg oral tablet: 1 tab(s) orally once a day  ***take with 20mg for TDD = 50mg*** (24 Jan 2023 17:56)  pravastatin 20 mg oral tablet: 1 tab(s) orally once a day (at bedtime) (24 Jan 2023 17:56)  terazosin 5 mg oral capsule: 1 cap(s) orally once a day (at bedtime) (24 Jan 2023 17:56)      MEDICATIONS  (STANDING):  acetaminophen     Tablet .. 1000 milliGRAM(s) Oral every 8 hours  acetaminophen   IVPB .. 1000 milliGRAM(s) IV Intermittent once  amLODIPine   Tablet 10 milliGRAM(s) Oral daily  atorvastatin 10 milliGRAM(s) Oral at bedtime  ceFAZolin   IVPB 2000 milliGRAM(s) IV Intermittent once  ceFAZolin   IVPB 2000 milliGRAM(s) IV Intermittent every 8 hours  chlorhexidine 2% Cloths 1 Application(s) Topical once  cinacalcet 30 milliGRAM(s) Oral two times a day  enoxaparin Injectable 40 milliGRAM(s) SubCutaneous every 24 hours  lactated ringers. 1000 milliLiter(s) (75 mL/Hr) IV Continuous <Continuous>  lactated ringers. 1000 milliLiter(s) (100 mL/Hr) IV Continuous <Continuous>  methimazole 5 milliGRAM(s) Oral daily  multivitamin 1 Tablet(s) Oral daily  pantoprazole    Tablet 40 milliGRAM(s) Oral before breakfast  PARoxetine 20 milliGRAM(s) Oral daily  polyethylene glycol 3350 17 Gram(s) Oral at bedtime  senna 2 Tablet(s) Oral at bedtime  sodium chloride 0.9%. 1000 milliLiter(s) (100 mL/Hr) IV Continuous <Continuous>  sucralfate suspension 1 Gram(s) Oral two times a day  tamsulosin 0.8 milliGRAM(s) Oral at bedtime    MEDICATIONS  (PRN):  HYDROmorphone  Injectable 0.25 milliGRAM(s) IV Push every 10 minutes PRN Moderate Pain (4 - 6)  HYDROmorphone  Injectable 0.5 milliGRAM(s) IV Push every 10 minutes PRN Severe Pain (7 - 10)  magnesium hydroxide Suspension 30 milliLiter(s) Oral daily PRN Constipation  ondansetron Injectable 4 milliGRAM(s) IV Push every 6 hours PRN Nausea and/or Vomiting  ondansetron Injectable 4 milliGRAM(s) IV Push once PRN Nausea and/or Vomiting  oxyCODONE    IR 5 milliGRAM(s) Oral every 3 hours PRN Moderate Pain (4 - 6)  oxyCODONE    IR 10 milliGRAM(s) Oral every 3 hours PRN Severe Pain (7 - 10)  oxyCODONE    IR 5 milliGRAM(s) Oral every 4 hours PRN Moderate Pain (4 - 6)      Allergies    ChlorproMAZINE Hydrochloride (Unknown)    Intolerances        REVIEW OF SYSTEMS:  CONSTITUTIONAL: No fever, weight loss, or fatigue  EYES: No eye pain, visual disturbances, or discharge  ENMT:  No difficulty hearing, tinnitus, vertigo; No sinus or throat pain  NECK: No pain or stiffness  BREASTS: No pain, masses, or nipple discharge  RESPIRATORY: No cough, wheezing, chills or hemoptysis; No shortness of breath  CARDIOVASCULAR: No chest pain, palpitations, dizziness, or leg swelling  GASTROINTESTINAL: No abdominal or epigastric pain. No nausea, vomiting, or hematemesis; No diarrhea or constipation. No melena or hematochezia.  GENITOURINARY: No dysuria, frequency, hematuria, or incontinence  NEUROLOGICAL: No headaches, memory loss, loss of strength, numbness, or tremors  SKIN: No itching, burning, rashes, or lesions   LYMPH NODES: No enlarged glands  ENDOCRINE: No heat or cold intolerance; No hair loss  MUSCULOSKELETAL: pain on movement     Vital Signs Last 24 Hrs  T(C): 36.7 (25 Jan 2023 07:41), Max: 37.3 (24 Jan 2023 17:30)  T(F): 98 (25 Jan 2023 07:41), Max: 99.1 (24 Jan 2023 17:30)  HR: 94 (25 Jan 2023 07:41) (84 - 120)  BP: 112/65 (25 Jan 2023 07:41) (108/70 - 152/81)  BP(mean): --  RR: 18 (25 Jan 2023 07:41) (14 - 23)  SpO2: 95% (25 Jan 2023 07:41) (91% - 99%)    Parameters below as of 25 Jan 2023 07:41  Patient On (Oxygen Delivery Method): room air        PHYSICAL EXAM:  GENERAL: NAD, well-groomed, well-developed  HEAD:  Atraumatic, Normocephalic  EYES: EOMI, PERRLA, conjunctiva and sclera clear  ENMT: Moist mucous membranes,   NECK: Supple, No JVD, Normal thyroid  NERVOUS SYSTEM:  Alert & Oriented X3, non focal  CHEST/LUNG: Clear to percussion bilaterally; No rales, rhonchi, wheezing, or rubs  HEART: Regular rate and rhythm; No murmurs, rubs, or gallops  ABDOMEN: Soft, Nontender, Nondistended; Bowel sounds present  EXTREMITIES:  rom restricted    LABS:                        9.4    9.75  )-----------( 146      ( 25 Jan 2023 06:00 )             28.5     01-25    137  |  105  |  20  ----------------------------<  187<H>  4.4   |  24  |  1.88<H>    Ca    8.7      25 Jan 2023 06:00    TPro  6.1  /  Alb  2.9<L>  /  TBili  0.4  /  DBili  x   /  AST  18  /  ALT  29  /  AlkPhos  68  01-25    PT/INR - ( 23 Jan 2023 17:40 )   PT: 13.5 sec;   INR: 1.17 ratio         PTT - ( 23 Jan 2023 17:40 )  PTT:29.1 sec    CAPILLARY BLOOD GLUCOSE              I&O's Summary    24 Jan 2023 07:01  -  25 Jan 2023 07:00  --------------------------------------------------------  IN: 800 mL / OUT: 550 mL / NET: 250 mL        RADIOLOGY & ADDITIONAL TESTS:    Imaging Personally Reviewed:  [x ] YES  [ ] NO    Consultant(s) Notes Reviewed:  [x ] YES  [ ] NO    Care Discussed with Consultants/Other Providers [ x] YES  [ ] NO

## 2023-01-25 NOTE — PHYSICAL THERAPY INITIAL EVALUATION ADULT - RANGE OF MOTION EXAMINATION, REHAB EVAL
left hip not tested due to  surg/bilateral upper extremity ROM was WNL (within normal limits)/Right LE ROM was WNL (within normal limits)

## 2023-01-25 NOTE — DISCHARGE NOTE PROVIDER - NSDCFUSCHEDAPPT_GEN_ALL_CORE_FT
Nikkie Kelsey  [facility] Physician [facility]  [id number]  Scheduled Appointment: 01/30/2023    Nikkie [facility]  [address] [facility]  Scheduled Appointment: 01/30/2023    [facility]  GENSURG [address]  Scheduled Appointment: 02/14/2023    Lynn Pedro  [facility]  ENDOCRIN [address]  Scheduled Appointment: 03/14/2023    [facility]  ORTHOSURG [address]  Scheduled Appointment: 03/30/2023     Nikkie [facility]  [address] [facility]  Scheduled Appointment: 01/30/2023    Lynn Pedro  [facility] Physician [facility]  [address]CRIN [address] [facility]  Scheduled Appointment: 03/14/2023    [facility]  [address] [address] [facility]  Scheduled Appointment: 03/30/2023     Moustapha Anthony  [facility]  ORTHOSURG [address]  Scheduled Appointment: 02/07/2023    Lynn Pedro  [facility]  END[address]CRIN [address]  Scheduled Appointment: 03/14/2023    [facility]  [address]URG [address] [facility] [address]  Scheduled Appointment: 03/30/2023

## 2023-01-25 NOTE — OCCUPATIONAL THERAPY INITIAL EVALUATION ADULT - PERTINENT HX OF CURRENT PROBLEM, REHAB EVAL
77 y.o. M with hx of prostate cancer (no tx), hyperthyroidism, CKD, HTN, HLD, hypercalcemia (found to have parathyroid nodule), hyperparathyroidism, planned for parathyroidectomy, and was getting preop labs at [facility]. As he was leaving pre surgical testing he bumped into a scale and fell. No LOC. No blood thinners. Difficulty walking afterwards due to pain. Pt with c/o L hip pain, found to have L IT fx. Pt now s/p L hip ORIF 1/24/23.

## 2023-01-25 NOTE — PROGRESS NOTE ADULT - SUBJECTIVE AND OBJECTIVE BOX
POST OPERATIVE DAY #: 1  STATUS POST: ORIF  Left hip gamma nail                     SUBJECTIVE: Patient seen and examined. Patient was done late last night and has not ambulated with PT. Tolerating diet. Voiding. Tolerating medications well. Patient laying in bed comfortably without complaints.  Denies fevers, chills, chest pain, calf pain, SOB, dizziness, lightheadedness     Reported Pain Score (0-10): 1/10    OBJECTIVE:     Vital Signs Last 24 Hrs  T(C): 36.7 (25 Jan 2023 07:41), Max: 37.3 (24 Jan 2023 08:00)  T(F): 98 (25 Jan 2023 07:41), Max: 99.2 (24 Jan 2023 08:00)  HR: 94 (25 Jan 2023 07:41) (84 - 120)  BP: 112/65 (25 Jan 2023 07:41) (108/70 - 154/75)  BP(mean): --  RR: 18 (25 Jan 2023 07:41) (14 - 23)  SpO2: 95% (25 Jan 2023 07:41) (91% - 99%)    Parameters below as of 25 Jan 2023 07:41  Patient On (Oxygen Delivery Method): room air        Left extremity:          Dressing:  Silverlon clean/dry/intact            Sensation:  intact to light touch          Motor exam: 5/5 dorsiflexion/plantarflexion/EHL          warm well perfused; capillary refill <3 seconds, 2+ DP b/l    LABS:                        9.4    9.75  )-----------( 146      ( 25 Jan 2023 06:00 )             28.5     01-25    137  |  105  |  20  ----------------------------<  187<H>  4.4   |  24  |  1.88<H>    Ca    8.7      25 Jan 2023 06:00    TPro  6.1  /  Alb  2.9<L>  /  TBili  0.4  /  DBili  x   /  AST  18  /  ALT  29  /  AlkPhos  68  01-25    PT/INR - ( 23 Jan 2023 17:40 )   PT: 13.5 sec;   INR: 1.17 ratio         PTT - ( 23 Jan 2023 17:40 )  PTT:29.1 sec      MEDICATIONS:  Anticoagulation:  enoxaparin Injectable 40 milliGRAM(s) SubCutaneous every 24 hours      Pain medications:   acetaminophen     Tablet .. 1000 milliGRAM(s) Oral every 8 hours  acetaminophen   IVPB .. 1000 milliGRAM(s) IV Intermittent once  HYDROmorphone  Injectable 0.25 milliGRAM(s) IV Push every 10 minutes PRN  HYDROmorphone  Injectable 0.5 milliGRAM(s) IV Push every 10 minutes PRN  ondansetron Injectable 4 milliGRAM(s) IV Push every 6 hours PRN  ondansetron Injectable 4 milliGRAM(s) IV Push once PRN  oxyCODONE    IR 5 milliGRAM(s) Oral every 3 hours PRN  oxyCODONE    IR 10 milliGRAM(s) Oral every 3 hours PRN  oxyCODONE    IR 5 milliGRAM(s) Oral every 4 hours PRN  PARoxetine 20 milliGRAM(s) Oral daily  traMADol 50 milliGRAM(s) Oral every 6 hours PRN        A/P :   s/p ORIF Right  Left   POD # 1  -    Pain control Oxycodone, dilaudid, Acetaminophen  -    DVT ppx: Lovenox 40 mg qd  -    Follow medicine recommendations  -    Physical Therapy  -    Discharge planning: Continue discharge planning

## 2023-01-25 NOTE — PROGRESS NOTE ADULT - SUBJECTIVE AND OBJECTIVE BOX
Subjective: c/o hip pain.       MEDICATIONS  (STANDING):  acetaminophen     Tablet .. 1000 milliGRAM(s) Oral every 8 hours  acetaminophen   IVPB .. 1000 milliGRAM(s) IV Intermittent once  amLODIPine   Tablet 10 milliGRAM(s) Oral daily  atorvastatin 10 milliGRAM(s) Oral at bedtime  ceFAZolin   IVPB 2000 milliGRAM(s) IV Intermittent once  chlorhexidine 2% Cloths 1 Application(s) Topical once  cinacalcet 30 milliGRAM(s) Oral two times a day  enoxaparin Injectable 40 milliGRAM(s) SubCutaneous every 24 hours  lactated ringers. 1000 milliLiter(s) (75 mL/Hr) IV Continuous <Continuous>  lactated ringers. 1000 milliLiter(s) (100 mL/Hr) IV Continuous <Continuous>  methimazole 5 milliGRAM(s) Oral daily  multivitamin 1 Tablet(s) Oral daily  pantoprazole    Tablet 40 milliGRAM(s) Oral before breakfast  PARoxetine 20 milliGRAM(s) Oral daily  PARoxetine 30 milliGRAM(s) Oral daily  polyethylene glycol 3350 17 Gram(s) Oral at bedtime  senna 2 Tablet(s) Oral at bedtime  sodium chloride 0.9%. 1000 milliLiter(s) (100 mL/Hr) IV Continuous <Continuous>  sucralfate suspension 1 Gram(s) Oral two times a day  tamsulosin 0.8 milliGRAM(s) Oral at bedtime    MEDICATIONS  (PRN):  HYDROmorphone  Injectable 0.25 milliGRAM(s) IV Push every 10 minutes PRN Moderate Pain (4 - 6)  HYDROmorphone  Injectable 0.5 milliGRAM(s) IV Push every 10 minutes PRN Severe Pain (7 - 10)  magnesium hydroxide Suspension 30 milliLiter(s) Oral daily PRN Constipation  ondansetron Injectable 4 milliGRAM(s) IV Push every 6 hours PRN Nausea and/or Vomiting  ondansetron Injectable 4 milliGRAM(s) IV Push once PRN Nausea and/or Vomiting  oxyCODONE    IR 5 milliGRAM(s) Oral every 3 hours PRN Moderate Pain (4 - 6)  oxyCODONE    IR 10 milliGRAM(s) Oral every 3 hours PRN Severe Pain (7 - 10)  oxyCODONE    IR 5 milliGRAM(s) Oral every 4 hours PRN Moderate Pain (4 - 6)          T(C): 36.7 (01-25-23 @ 07:41), Max: 37.3 (01-24-23 @ 17:30)  HR: 94 (01-25-23 @ 07:41) (84 - 120)  BP: 112/65 (01-25-23 @ 07:41) (108/70 - 152/81)  RR: 18 (01-25-23 @ 07:41) (14 - 23)  SpO2: 95% (01-25-23 @ 07:41) (91% - 99%)  Wt(kg): --        I&O's Detail    24 Jan 2023 07:01  -  25 Jan 2023 07:00  --------------------------------------------------------  IN:    sodium chloride 0.9%: 800 mL  Total IN: 800 mL    OUT:    Voided (mL): 550 mL  Total OUT: 550 mL    Total NET: 250 mL               PHYSICAL EXAM:    GENERAL: NAD  NECK: Supple, no inc in JVP  CHEST/LUNG: Clear  HEART: S1S2  ABDOMEN: Soft, Nontender, Nondistended; Bowel sounds present  EXTREMITIES:  no edema.       LABS:  CBC Full  -  ( 25 Jan 2023 06:00 )  WBC Count : 9.75 K/uL  RBC Count : 3.31 M/uL  Hemoglobin : 9.4 g/dL  Hematocrit : 28.5 %  Platelet Count - Automated : 146 K/uL  Mean Cell Volume : 86.1 fl  Mean Cell Hemoglobin : 28.4 pg  Mean Cell Hemoglobin Concentration : 33.0 gm/dL  Auto Neutrophil # : 9.00 K/uL  Auto Lymphocyte # : 0.36 K/uL  Auto Monocyte # : 0.34 K/uL  Auto Eosinophil # : 0.00 K/uL  Auto Basophil # : 0.00 K/uL  Auto Neutrophil % : 92.3 %  Auto Lymphocyte % : 3.7 %  Auto Monocyte % : 3.5 %  Auto Eosinophil % : 0.0 %  Auto Basophil % : 0.0 %    01-25    137  |  105  |  20  ----------------------------<  187<H>  4.4   |  24  |  1.88<H>    Ca    8.7      25 Jan 2023 06:00      ASSESSMENT:  1.  CKD stage 3 @ baseline. Trivial rise in Cr since yest  2.  Recent severe hypercalcemia due to primary HPT, better `  3.  Hip fracture. S/p L hip ORIF on 1/24    PLAN:  Cont saline for another day  BMP tomorrow.

## 2023-01-25 NOTE — OCCUPATIONAL THERAPY INITIAL EVALUATION ADULT - MANUAL MUSCLE TESTING RESULTS, REHAB EVAL
b/l UE and RLE at least 3+/5, L knee 3/5, L ankle 3+/5, L hip not tested 2* surgery/grossly assessed due to

## 2023-01-25 NOTE — DISCHARGE NOTE PROVIDER - NSDCFUADDINST_GEN_ALL_CORE_FT
- Call your doctor if you experience:  • An increase in pain not controlled by pain medication or change in activity or  position.  • Temperature greater than 101° F.  • Redness, increased swelling or foul smelling drainage from or around the  incision.  • Numbness, tingling or a change in color or temperature of the operative extremity.  • Call your doctor immediately if you experience chest pain, shortness of breath or calf pain.     Follow all verbal and written instructions. Take medications as prescribed. DO NOT drive, operate machinery, and/or make important decisions while on prescription pain medication. DO NOT hesitate to call Doctor's office with questions or concerns.

## 2023-01-25 NOTE — DISCHARGE NOTE PROVIDER - CARE PROVIDER_API CALL
Moustapha Anthony)  Orthopaedic Sports Medicine; Orthopaedic Surgery  [address] [address]  Phone: [phone]  Fax: [phone]  Follow Up Time:    Moustapha Anthony)  Orthopaedic Sports Medicine; Orthopaedic Surgery  [address] [facility] [address]  [place] [address]  Phone: [phone]  Fax: [phone]  Established Patient  Follow Up Time: 2 weeks

## 2023-01-25 NOTE — PHYSICAL THERAPY INITIAL EVALUATION ADULT - ADDITIONAL COMMENTS
pvt home with 2+2 REYNA w/o HR, no steps inside stays on main floor. Pt does not have any DME. Pt drives.

## 2023-01-25 NOTE — DISCHARGE NOTE PROVIDER - NSDCCPCAREPLAN_GEN_ALL_CORE_FT
PRINCIPAL DISCHARGE DIAGNOSIS  Diagnosis: Closed intertrochanteric fracture of left hip  Assessment and Plan of Treatment: Physical Therapy/Occupational Therapy for: Ambulation, transfers, stairs, & ADLs, isometrics.   Full weight bearing on both legs; Walker/cane use as instructed by Physical therapy/Occupational therapy.    Ice packs to hip for 30 min. every 3 hours and after physical therapy.   Keep incision clean and dry. May shower 5 days after surgery if no drainage from   Call office for follow up on/near Post Op Day # 14 in Surgeon's office.  • Do not take a tub bath yet.   • Do not resume driving until you have your surgeon’s permission.   Silverlon dressing is waterproof.  You may shower, but do not aim shower stream at surgical site. Pat dry after shower.   Remove Silverlon dressing on postop day #7. May shower after Silverlon removal.       PRINCIPAL DISCHARGE DIAGNOSIS  Diagnosis: Closed intertrochanteric fracture of left hip  Assessment and Plan of Treatment: Physical Therapy/Occupational Therapy for: Ambulation, transfers, stairs, & ADLs, isometrics.   Full weight bearing on both legs; Walker/cane use as instructed by Physical therapy/Occupational therapy.    Ice packs to hip for 30 min. every 3 hours and after physical therapy.   Keep incision clean and dry. May shower if no drainage from incision. Dressing changes as needed. May leave incisions open to air if they are dry.  Staple removal at first postop appointment at surgeon's office.  Call office for follow up on/near Post Op Day # 14 in Surgeon's office.  • Do not take a tub bath yet.   • Do not resume driving until you have your surgeon’s permission.

## 2023-01-25 NOTE — PROGRESS NOTE ADULT - SUBJECTIVE AND OBJECTIVE BOX
[INTERVAL HX: ]  Patient seen and examined;  Chart reviewed and events noted;     no CP, no SOB  no LE edema    surgery went well per pt.   reports planned parathyroid surgery.   On ASA outpt for last 1+ year per cardiologist      [MEDICATIONS]  MEDICATIONS  (STANDING):  acetaminophen     Tablet .. 1000 milliGRAM(s) Oral every 8 hours  acetaminophen   IVPB .. 1000 milliGRAM(s) IV Intermittent once  amLODIPine   Tablet 10 milliGRAM(s) Oral daily  atorvastatin 10 milliGRAM(s) Oral at bedtime  ceFAZolin   IVPB 2000 milliGRAM(s) IV Intermittent once  ceFAZolin   IVPB 2000 milliGRAM(s) IV Intermittent every 8 hours  chlorhexidine 2% Cloths 1 Application(s) Topical once  cinacalcet 30 milliGRAM(s) Oral two times a day  enoxaparin Injectable 40 milliGRAM(s) SubCutaneous every 24 hours  lactated ringers. 1000 milliLiter(s) (75 mL/Hr) IV Continuous <Continuous>  lactated ringers. 1000 milliLiter(s) (100 mL/Hr) IV Continuous <Continuous>  methimazole 5 milliGRAM(s) Oral daily  multivitamin 1 Tablet(s) Oral daily  pantoprazole    Tablet 40 milliGRAM(s) Oral before breakfast  PARoxetine 20 milliGRAM(s) Oral daily  polyethylene glycol 3350 17 Gram(s) Oral at bedtime  senna 2 Tablet(s) Oral at bedtime  sodium chloride 0.9%. 1000 milliLiter(s) (100 mL/Hr) IV Continuous <Continuous>  sucralfate suspension 1 Gram(s) Oral two times a day  tamsulosin 0.8 milliGRAM(s) Oral at bedtime    MEDICATIONS  (PRN):  HYDROmorphone  Injectable 0.25 milliGRAM(s) IV Push every 10 minutes PRN Moderate Pain (4 - 6)  HYDROmorphone  Injectable 0.5 milliGRAM(s) IV Push every 10 minutes PRN Severe Pain (7 - 10)  magnesium hydroxide Suspension 30 milliLiter(s) Oral daily PRN Constipation  ondansetron Injectable 4 milliGRAM(s) IV Push every 6 hours PRN Nausea and/or Vomiting  ondansetron Injectable 4 milliGRAM(s) IV Push once PRN Nausea and/or Vomiting  oxyCODONE    IR 5 milliGRAM(s) Oral every 3 hours PRN Moderate Pain (4 - 6)  oxyCODONE    IR 10 milliGRAM(s) Oral every 3 hours PRN Severe Pain (7 - 10)  oxyCODONE    IR 5 milliGRAM(s) Oral every 4 hours PRN Moderate Pain (4 - 6)      [VITALS]  Vital Signs Last 24 Hrs  T(C): 36.7 (25 Jan 2023 07:41), Max: 37.3 (24 Jan 2023 17:30)  T(F): 98 (25 Jan 2023 07:41), Max: 99.1 (24 Jan 2023 17:30)  HR: 94 (25 Jan 2023 07:41) (84 - 120)  BP: 112/65 (25 Jan 2023 07:41) (108/70 - 152/81)  BP(mean): --  RR: 18 (25 Jan 2023 07:41) (14 - 23)  SpO2: 95% (25 Jan 2023 07:41) (91% - 99%)    Parameters below as of 25 Jan 2023 07:41  Patient On (Oxygen Delivery Method): room air      [WT/HT]  Daily     Daily   [VENT]      [PHYSICAL EXAM]  GEN: NAD  HEENT: normocephalic and atraumatic. EOMI. .    NECK: Supple.  No lymphadenopathy   LUNGS: Clear to auscultation.  HEART: Regular rate and rhythm,   ABDOMEN: Soft, nontender, and nondistended.  Positive bowel sounds.    : No CVA tenderness  EXTREMITIES: Without edema.  NEUROLOGIC: grossly intact.  PSYCHIATRIC: Appropriate affect .  SKIN: No rash     [LABS:]                        9.4    9.75  )-----------( 146      ( 25 Jan 2023 06:00 )             28.5     01-25    137  |  105  |  20  ----------------------------<  187<H>  4.4   |  24  |  1.88<H>    Ca    8.7      25 Jan 2023 06:00    TPro  6.1  /  Alb  2.9<L>  /  TBili  0.4  /  DBili  x   /  AST  18  /  ALT  29  /  AlkPhos  68  01-25    PT/INR - ( 23 Jan 2023 17:40 )   PT: 13.5 sec;   INR: 1.17 ratio         PTT - ( 23 Jan 2023 17:40 )  PTT:29.1 sec      Serum Protein Electrophoresis Interp: Normal Electrophoresis Pattern (01-05-23 @ 06:04)        COVID-19 PCR: NotDetec (23 Jan 2023 17:40)          [RADIOLOGY STUDIES:]

## 2023-01-25 NOTE — DISCHARGE NOTE PROVIDER - PROVIDER TOKENS
PROVIDER:[TOKEN:[[id number]]] PROVIDER:[TOKEN:[[id number]],FOLLOWUP:[2 weeks],ESTABLISHEDPATIENT:[T]]

## 2023-01-25 NOTE — DISCHARGE NOTE PROVIDER - HOSPITAL COURSE
This is a 77 y.o. M admitted to  [facility] through the ED on 1/24/23 after sustaining a Left hip fracture post fall  Received H&P including complete medical history, physical exam, medication review, preop clearance.  Surgical consent was obtained.  Transferred 1/24/23 into OR. Received Margaret-Op parenteral antibiotics for SCIP protocol .  Patient underwent uncomplicated  Left hip gamma nail by Dr. Anthony.  Stable PACU course then transferred to Surgical floor for acute postoperative care.  Pre-Op meds continued post-op and modified as medically necessary. Post-Op medical eval by Hospitalist team for post-op medical comanagement.  Extended course of IV  antibiotics to complete surgical prophylaxis.   VTE prophylaxis with Lovenox 40 mg qd + PAS.  Post-Op PT/OT modalities for ambulation, stairs. ADLs, &  transfers.  Stable post-op course. Tolerated diet well.  Stable neuromotor exam of Left Right Lower extremity.  All D/C instructions and medication reconciliation completed including pain Rx & VTEP Rx. This is a 77 y.o. M admitted to  [facility] through the ED on 1/24/23 after sustaining a Left hip fracture post fall  Received H&P including complete medical history, physical exam, medication review, preop clearance.  Surgical consent was obtained.  Transferred 1/24/23 into OR. Received Margaret-Op parenteral antibiotics for SCIP protocol .  Patient underwent uncomplicated  Left hip gamma nail by Dr. Anthony.  Stable PACU course then transferred to Surgical floor for acute postoperative care.  Pre-Op meds continued post-op and modified as medically necessary. Post-Op medical eval by Hospitalist team for post-op medical comanagement.  Extended course of IV  antibiotics to complete surgical prophylaxis.   VTE prophylaxis with Lovenox 40 mg qd + PAS.  Post-Op PT/OT modalities for ambulation, stairs. ADLs, &  transfers.  Stable post-op course. Tolerated diet well.  Stable neuromotor exam of Left Right Lower extremity.  All D/C instructions and medication reconciliation completed including pain Rx & VTEP Rx.   77M with hx of prostate cancer (no tx), hyperthyroidism, CKD, HTN, HLD, r hypercalcemia (found to have parathyroid nodule),  hyperparathyroidism.planned for parathyroidectomy, and was getting preop labs tday, .c/o left thigh pain, mild head pain s/p trip and fall. As he was leaving pre surgical testing he bumped into a scale and fell. No LOC. No blood thinners.Difficulty walking afterwards due to pain.   In ED afebrile,  /82, calium levels normal, ortho consult called and is being admitted  left INtertrochanteric femur fracture with OP s/p ORIF left on 1/24/23  CASPER with CKD base line creat 1.5  HTN  HLD   hyperparathyroidism  Thyroid nodule   lacunar infarcts age indeterminate  h/o prostate ca   anemia of ch disease with acute decrease in HB due to  blood loss post op received one unit PRBC, HB stable >9  Sinus tachycardia, no fever, no evidence of infection , DVT ruled out , pt on DVt pro, TSH normal , pt on methimazole, started on metoprolol as cardiology , improved   cardiology cleared for discharge   DC home if OK with ortho

## 2023-01-25 NOTE — OCCUPATIONAL THERAPY INITIAL EVALUATION ADULT - LIVES WITH, PROFILE
Pt lives with his wife in a private home, 2+2 steps to enter with 0 steps inside and a walk in shower. Pt was independent with ADLs, IADLs, functional mobility/transfers, driving prior to admission without AD./spouse

## 2023-01-26 ENCOUNTER — TRANSCRIPTION ENCOUNTER (OUTPATIENT)
Age: 78
End: 2023-01-26

## 2023-01-26 ENCOUNTER — RX RENEWAL (OUTPATIENT)
Age: 78
End: 2023-01-26

## 2023-01-26 LAB
ALBUMIN SERPL ELPH-MCNC: 2.7 G/DL — LOW (ref 3.3–5)
ALP SERPL-CCNC: 58 U/L — SIGNIFICANT CHANGE UP (ref 30–120)
ALT FLD-CCNC: 12 U/L DA — SIGNIFICANT CHANGE UP (ref 10–60)
ANION GAP SERPL CALC-SCNC: 8 MMOL/L — SIGNIFICANT CHANGE UP (ref 5–17)
AST SERPL-CCNC: 16 U/L — SIGNIFICANT CHANGE UP (ref 10–40)
BILIRUB SERPL-MCNC: 2.3 MG/DL — HIGH (ref 0.2–1.2)
BUN SERPL-MCNC: 27 MG/DL — HIGH (ref 7–23)
CALCIUM SERPL-MCNC: 8 MG/DL — LOW (ref 8.4–10.5)
CHLORIDE SERPL-SCNC: 106 MMOL/L — SIGNIFICANT CHANGE UP (ref 96–108)
CO2 SERPL-SCNC: 25 MMOL/L — SIGNIFICANT CHANGE UP (ref 22–31)
CREAT SERPL-MCNC: 1.83 MG/DL — HIGH (ref 0.5–1.3)
EGFR: 38 ML/MIN/1.73M2 — LOW
GLUCOSE SERPL-MCNC: 107 MG/DL — HIGH (ref 70–99)
HCT VFR BLD CALC: 24.5 % — LOW (ref 39–50)
HGB BLD-MCNC: 8.1 G/DL — LOW (ref 13–17)
MCHC RBC-ENTMCNC: 28.5 PG — SIGNIFICANT CHANGE UP (ref 27–34)
MCHC RBC-ENTMCNC: 33.1 GM/DL — SIGNIFICANT CHANGE UP (ref 32–36)
MCV RBC AUTO: 86.3 FL — SIGNIFICANT CHANGE UP (ref 80–100)
NRBC # BLD: 0 /100 WBCS — SIGNIFICANT CHANGE UP (ref 0–0)
PLATELET # BLD AUTO: 136 K/UL — LOW (ref 150–400)
POTASSIUM SERPL-MCNC: 3.9 MMOL/L — SIGNIFICANT CHANGE UP (ref 3.5–5.3)
POTASSIUM SERPL-SCNC: 3.9 MMOL/L — SIGNIFICANT CHANGE UP (ref 3.5–5.3)
PROT SERPL-MCNC: 5.5 G/DL — LOW (ref 6–8.3)
RBC # BLD: 2.84 M/UL — LOW (ref 4.2–5.8)
RBC # FLD: 14.8 % — HIGH (ref 10.3–14.5)
SODIUM SERPL-SCNC: 139 MMOL/L — SIGNIFICANT CHANGE UP (ref 135–145)
WBC # BLD: 11.7 K/UL — HIGH (ref 3.8–10.5)
WBC # FLD AUTO: 11.7 K/UL — HIGH (ref 3.8–10.5)

## 2023-01-26 RX ORDER — CINACALCET 30 MG/1
30 TABLET, FILM COATED ORAL DAILY
Refills: 0 | Status: DISCONTINUED | OUTPATIENT
Start: 2023-01-26 | End: 2023-01-30

## 2023-01-26 RX ADMIN — TAMSULOSIN HYDROCHLORIDE 0.8 MILLIGRAM(S): 0.4 CAPSULE ORAL at 21:29

## 2023-01-26 RX ADMIN — OXYCODONE HYDROCHLORIDE 5 MILLIGRAM(S): 5 TABLET ORAL at 09:05

## 2023-01-26 RX ADMIN — Medication 1000 MILLIGRAM(S): at 06:00

## 2023-01-26 RX ADMIN — OXYCODONE HYDROCHLORIDE 5 MILLIGRAM(S): 5 TABLET ORAL at 14:34

## 2023-01-26 RX ADMIN — SODIUM CHLORIDE 100 MILLILITER(S): 9 INJECTION, SOLUTION INTRAVENOUS at 08:29

## 2023-01-26 RX ADMIN — Medication 20 MILLIGRAM(S): at 12:57

## 2023-01-26 RX ADMIN — Medication 1000 MILLIGRAM(S): at 15:47

## 2023-01-26 RX ADMIN — Medication 1000 MILLIGRAM(S): at 14:54

## 2023-01-26 RX ADMIN — Medication 30 MILLIGRAM(S): at 12:58

## 2023-01-26 RX ADMIN — Medication 1000 MILLIGRAM(S): at 21:29

## 2023-01-26 RX ADMIN — CINACALCET 30 MILLIGRAM(S): 30 TABLET, FILM COATED ORAL at 06:00

## 2023-01-26 RX ADMIN — ATORVASTATIN CALCIUM 10 MILLIGRAM(S): 80 TABLET, FILM COATED ORAL at 21:29

## 2023-01-26 RX ADMIN — PANTOPRAZOLE SODIUM 40 MILLIGRAM(S): 20 TABLET, DELAYED RELEASE ORAL at 06:02

## 2023-01-26 RX ADMIN — Medication 1000 MILLIGRAM(S): at 22:30

## 2023-01-26 RX ADMIN — AMLODIPINE BESYLATE 10 MILLIGRAM(S): 2.5 TABLET ORAL at 06:00

## 2023-01-26 RX ADMIN — Medication 1000 MILLIGRAM(S): at 06:04

## 2023-01-26 RX ADMIN — ENOXAPARIN SODIUM 40 MILLIGRAM(S): 100 INJECTION SUBCUTANEOUS at 08:29

## 2023-01-26 RX ADMIN — OXYCODONE HYDROCHLORIDE 5 MILLIGRAM(S): 5 TABLET ORAL at 10:06

## 2023-01-26 RX ADMIN — OXYCODONE HYDROCHLORIDE 5 MILLIGRAM(S): 5 TABLET ORAL at 15:30

## 2023-01-26 NOTE — DISCHARGE NOTE NURSING/CASE MANAGEMENT/SOCIAL WORK - NSSCNAMETXT_GEN_ALL_CORE
[facility] - [phone]/ [phone]  Registered Nurse to visit the day after hospital discharge; Physical Therapist to follow. Please contact the home care agency at the above phone number if you have not heard from them by 12 noon on the day after your hospital discharge.  [facility] - [phone]/ [phone]  Physical Therapist to follow. Visiting nurse to follow if needed. Please contact the home care agency at the above phone number if you have not heard from them by 12 noon on the day after your hospital discharge.

## 2023-01-26 NOTE — DISCHARGE NOTE NURSING/CASE MANAGEMENT/SOCIAL WORK - NSSCCARECORD_GEN_ALL_CORE
[facility]

## 2023-01-26 NOTE — DISCHARGE NOTE NURSING/CASE MANAGEMENT/SOCIAL WORK - NSDCDMETYPESERV_GEN_ALL_CORE_FT
Rolling Walker, 3:1 Commode  Rolling Walker. Your insurance did not cover the commode or the shower chair. Your family has purchased a hip chair, shower chair, and will get a raised toilet seat

## 2023-01-26 NOTE — DISCHARGE NOTE NURSING/CASE MANAGEMENT/SOCIAL WORK - NSDCPEFALRISK_GEN_ALL_CORE
For information on Fall & Injury Prevention, visit: [email or web address] OR  [email or web address] OR  https://www.cdc.gov/steadi/patient.html

## 2023-01-26 NOTE — PROGRESS NOTE ADULT - SUBJECTIVE AND OBJECTIVE BOX
Pt Name: GABE GODFREY  MRN: [id number]      Patient is a 77y Male being followed for left hip IM nail, POD#2. Patient seen and examined at bedside. Patient is doing well. Patient is working with PT, currently recommending acute rehab, patient undecided. Pain is controlled with the prescribed medication. Denies CP, SOB, N/V, numbness/tingling. No other orthopedic complaints.        Vital Signs Last 24 Hrs  T(C): 36.7 (26 Jan 2023 07:55), Max: 37.1 (25 Jan 2023 23:30)  T(F): 98.1 (26 Jan 2023 07:55), Max: 98.7 (25 Jan 2023 23:30)  HR: 93 (26 Jan 2023 07:55) (89 - 99)  BP: 130/73 (26 Jan 2023 07:55) (121/68 - 139/77)  BP(mean): --  RR: 18 (26 Jan 2023 07:55) (18 - 18)  SpO2: 95% (26 Jan 2023 07:55) (95% - 97%)    Parameters below as of 26 Jan 2023 07:55  Patient On (Oxygen Delivery Method): room air      Daily     Daily                           8.1    11.70 )-----------( 136      ( 26 Jan 2023 06:54 )             24.5     01-26    139  |  106  |  27<H>  ----------------------------<  107<H>  3.9   |  25  |  1.83<H>    Ca    8.0<L>      26 Jan 2023 06:54    TPro  5.5<L>  /  Alb  2.7<L>  /  TBili  2.3<H>  /  DBili  x   /  AST  16  /  ALT  12  /  AlkPhos  58  01-26      PHYSICAL EXAM:    Appearance: Alert, responsive, in no acute distress.    Musculoskeletal:       Left Lower Extremity: Left hip dressings are clean, dry, and intact. No abrasions, ecchymosis, or erythema. No bleeding. Sensation is grossly intact to light touch. + dorsi/plantarflexion/EHL/FHL. DP pulses 2+. Cap refill < 2 seconds. No cyanosis. No signs of venous insufficiency or stasis.           A/P:  Pt is a  77y Male s/p left hip IM nail, POD#2    PLAN:   * Pain control PRN  * DVTp: Lovenox  * Weight Bearing Status: WBAT LLE  * PT/OT - recommending AR  * Continue care as per primary team  * discharge planning Pt Name: GABE GODFREY  MRN: [id number]      Patient is a 77y Male being followed for left hip IM nail, POD#2. Patient seen and examined at bedside. Patient is doing well. Patient is working with PT, currently recommending acute rehab, patient undecided. Pain is controlled with the prescribed medication. Denies CP, SOB, N/V, numbness/tingling. No other orthopedic complaints.        Vital Signs Last 24 Hrs  T(C): 36.7 (26 Jan 2023 07:55), Max: 37.1 (25 Jan 2023 23:30)  T(F): 98.1 (26 Jan 2023 07:55), Max: 98.7 (25 Jan 2023 23:30)  HR: 93 (26 Jan 2023 07:55) (89 - 99)  BP: 130/73 (26 Jan 2023 07:55) (121/68 - 139/77)  BP(mean): --  RR: 18 (26 Jan 2023 07:55) (18 - 18)  SpO2: 95% (26 Jan 2023 07:55) (95% - 97%)    Parameters below as of 26 Jan 2023 07:55  Patient On (Oxygen Delivery Method): room air      Daily     Daily                           8.1    11.70 )-----------( 136      ( 26 Jan 2023 06:54 )             24.5     01-26    139  |  106  |  27<H>  ----------------------------<  107<H>  3.9   |  25  |  1.83<H>    Ca    8.0<L>      26 Jan 2023 06:54    TPro  5.5<L>  /  Alb  2.7<L>  /  TBili  2.3<H>  /  DBili  x   /  AST  16  /  ALT  12  /  AlkPhos  58  01-26      PHYSICAL EXAM:    Appearance: Alert, responsive, in no acute distress.    Musculoskeletal:       Left Lower Extremity: Left hip dressings are clean, dry, and intact. No abrasions, ecchymosis, or erythema. No bleeding. Sensation is grossly intact to light touch. + dorsi/plantarflexion/EHL/FHL. DP pulses 2+. Cap refill < 2 seconds. No cyanosis. No signs of venous insufficiency or stasis.           A/P:  Pt is a  77y Male s/p left hip IM nail, POD#2  - Postop Anemia - Hgb 8.1 from 9.4 yesterday    PLAN:   * Pain control PRN  * DVTp: Lovenox  * Weight Bearing Status: WBAT LLE  * PT/OT - recommending AR  * Continue care as per primary team  * discharge planning

## 2023-01-26 NOTE — PROGRESS NOTE ADULT - SUBJECTIVE AND OBJECTIVE BOX
Chief Complaint: Fall    Interval Events: No events overnight.    Review of Systems:  General: No fevers, chills, weight gain  Skin: No rashes, color changes  Cardiovascular: No chest pain, orthopnea  Respiratory: No shortness of breath, cough  Gastrointestinal: No nausea, abdominal pain  Genitourinary: No incontinence, pain with urination  Musculoskeletal: No pain, swelling, decreased range of motion  Neurological: No headache, weakness  Psychiatric: No depression, anxiety  Endocrine: No weight gain, increased thirst  All other systems are comprehensively negative.    Physical Exam:  Vital Signs Last 24 Hrs  T(C): 36.7 (26 Jan 2023 07:55), Max: 37.1 (25 Jan 2023 23:30)  T(F): 98.1 (26 Jan 2023 07:55), Max: 98.7 (25 Jan 2023 23:30)  HR: 93 (26 Jan 2023 07:55) (89 - 99)  BP: 130/73 (26 Jan 2023 07:55) (121/68 - 139/77)  BP(mean): --  RR: 18 (26 Jan 2023 07:55) (18 - 18)  SpO2: 95% (26 Jan 2023 07:55) (95% - 97%)  Parameters below as of 26 Jan 2023 07:55  Patient On (Oxygen Delivery Method): room air  General: NAD  HEENT: MMM  Neck: No JVD, no carotid bruit  Lungs: CTAB  CV: RRR, nl S1/S2, no M/R/G  Abdomen: S/NT/ND, +BS  Extremities: No LE edema, no cyanosis  Neuro: AAOx3, non-focal  Skin: No rash    Labs:             01-26    139  |  106  |  27<H>  ----------------------------<  107<H>  3.9   |  25  |  1.83<H>    Ca    8.0<L>      26 Jan 2023 06:54    TPro  5.5<L>  /  Alb  2.7<L>  /  TBili  2.3<H>  /  DBili  x   /  AST  16  /  ALT  12  /  AlkPhos  58  01-26                        8.1    11.70 )-----------( 136      ( 26 Jan 2023 06:54 )             24.5

## 2023-01-26 NOTE — PROGRESS NOTE ADULT - SUBJECTIVE AND OBJECTIVE BOX
Patient is a 77y old  Male who presents with a chief complaint of s/p fall left hip pain (26 Jan 2023 08:34)      INTERVAL HPI/OVERNIGHT EVENTS:overnight events noted    Home Medications:  acetaminophen 500 mg oral tablet: 2 tab(s) orally every 8 hours (25 Jan 2023 10:27)  aspirin 81 mg oral tablet, chewable: 1 tab(s) orally once a day.last dose 1/23/23 (24 Jan 2023 17:56)  Carafate 1 g/10 mL oral suspension: 10 milliliter(s) orally 2 times a day (24 Jan 2023 17:56)  Flonase 50 mcg/inh nasal spray: 2 spray(s) in each nostril once a day (24 Jan 2023 17:56)  methIMAzole 5 mg oral tablet: 0.5 tab(s) orally once a day (24 Jan 2023 17:56)  Multiple Vitamins oral tablet: 1 tab(s) orally once a day (25 Jan 2023 10:27)  omeprazole 20 mg oral delayed release capsule: 1 cap(s) orally once a day (24 Jan 2023 17:56)  PARoxetine 20 mg oral tablet: 1 tab(s) orally once a day  ***take with 30mg for TDD = 50mg*** (24 Jan 2023 17:56)  PARoxetine 30 mg oral tablet: 1 tab(s) orally once a day  ***take with 20mg for TDD = 50mg*** (24 Jan 2023 17:56)  polyethylene glycol 3350 oral powder for reconstitution: 17 gram(s) orally once a day (at bedtime) (25 Jan 2023 10:27)  pravastatin 20 mg oral tablet: 1 tab(s) orally once a day (at bedtime) (24 Jan 2023 17:56)  senna leaf extract oral tablet: 2 tab(s) orally once a day (at bedtime) (25 Jan 2023 10:27)  terazosin 5 mg oral capsule: 1 cap(s) orally once a day (at bedtime) (24 Jan 2023 17:56)      MEDICATIONS  (STANDING):  acetaminophen     Tablet .. 1000 milliGRAM(s) Oral every 8 hours  acetaminophen   IVPB .. 1000 milliGRAM(s) IV Intermittent once  amLODIPine   Tablet 10 milliGRAM(s) Oral daily  atorvastatin 10 milliGRAM(s) Oral at bedtime  ceFAZolin   IVPB 2000 milliGRAM(s) IV Intermittent once  chlorhexidine 2% Cloths 1 Application(s) Topical once  cinacalcet 30 milliGRAM(s) Oral two times a day  enoxaparin Injectable 40 milliGRAM(s) SubCutaneous every 24 hours  lactated ringers. 1000 milliLiter(s) (75 mL/Hr) IV Continuous <Continuous>  lactated ringers. 1000 milliLiter(s) (100 mL/Hr) IV Continuous <Continuous>  methimazole 5 milliGRAM(s) Oral daily  multivitamin 1 Tablet(s) Oral daily  pantoprazole    Tablet 40 milliGRAM(s) Oral before breakfast  PARoxetine 20 milliGRAM(s) Oral daily  PARoxetine 30 milliGRAM(s) Oral daily  polyethylene glycol 3350 17 Gram(s) Oral at bedtime  senna 2 Tablet(s) Oral at bedtime  sodium chloride 0.9%. 1000 milliLiter(s) (100 mL/Hr) IV Continuous <Continuous>  sucralfate suspension 1 Gram(s) Oral two times a day  tamsulosin 0.8 milliGRAM(s) Oral at bedtime    MEDICATIONS  (PRN):  HYDROmorphone  Injectable 0.5 milliGRAM(s) IV Push every 10 minutes PRN Severe Pain (7 - 10)  HYDROmorphone  Injectable 0.25 milliGRAM(s) IV Push every 10 minutes PRN Moderate Pain (4 - 6)  magnesium hydroxide Suspension 30 milliLiter(s) Oral daily PRN Constipation  ondansetron Injectable 4 milliGRAM(s) IV Push once PRN Nausea and/or Vomiting  ondansetron Injectable 4 milliGRAM(s) IV Push every 6 hours PRN Nausea and/or Vomiting  oxyCODONE    IR 5 milliGRAM(s) Oral every 3 hours PRN Moderate Pain (4 - 6)  oxyCODONE    IR 10 milliGRAM(s) Oral every 3 hours PRN Severe Pain (7 - 10)  oxyCODONE    IR 5 milliGRAM(s) Oral every 4 hours PRN Moderate Pain (4 - 6)      Allergies    ChlorproMAZINE Hydrochloride (Unknown)    Intolerances        REVIEW OF SYSTEMS:  CONSTITUTIONAL: No fever, weight loss, or fatigue  EYES: No eye pain, visual disturbances, or discharge  ENMT:  No difficulty hearing, tinnitus, vertigo; No sinus or throat pain  NECK: No pain or stiffness  BREASTS: No pain, masses, or nipple discharge  RESPIRATORY: No cough, wheezing, chills or hemoptysis; No shortness of breath  CARDIOVASCULAR: No chest pain, palpitations, dizziness, or leg swelling  GASTROINTESTINAL: No abdominal or epigastric pain. No nausea, vomiting, or hematemesis; No diarrhea or constipation. No melena or hematochezia.  GENITOURINARY: No dysuria, frequency, hematuria, or incontinence  NEUROLOGICAL: No headaches, memory loss, loss of strength, numbness, or tremors  SKIN: No itching, burning, rashes, or lesions   LYMPH NODES: No enlarged glands  ENDOCRINE: No heat or cold intolerance; No hair loss  MUSCULOSKELETAL: hip pain on movemnt  PSYCHIATRIC: No depression, anxiety, mood swings, or difficulty sleeping  HEME/LYMPH: No easy bruising, or bleeding gums  ALLERGY AND IMMUNOLOGIC: No hives or eczema    Vital Signs Last 24 Hrs  T(C): 36.7 (26 Jan 2023 07:55), Max: 37.1 (25 Jan 2023 23:30)  T(F): 98.1 (26 Jan 2023 07:55), Max: 98.7 (25 Jan 2023 23:30)  HR: 93 (26 Jan 2023 07:55) (89 - 99)  BP: 130/73 (26 Jan 2023 07:55) (121/68 - 139/77)  BP(mean): --  RR: 18 (26 Jan 2023 07:55) (18 - 18)  SpO2: 95% (26 Jan 2023 07:55) (95% - 97%)    Parameters below as of 26 Jan 2023 07:55  Patient On (Oxygen Delivery Method): room air        PHYSICAL EXAM:  GENERAL: NAD, well-groomed, well-developed  HEAD:  Atraumatic, Normocephalic  EYES: EOMI, PERRLA, conjunctiva and sclera clear  ENMT: Moist mucous membranes,   NECK: Supple, No JVD, Normal thyroid  NERVOUS SYSTEM:  Alert & Oriented X3,non focal  CHEST/LUNG: Clear to percussion bilaterally; No rales, rhonchi, wheezing, or rubs  HEART: Regular rate and rhythm; No murmurs, rubs, or gallops  ABDOMEN: Soft, Nontender, Nondistended; Bowel sounds present  EXTREMITIES:rom restricted  LABS:                        8.1    11.70 )-----------( 136      ( 26 Jan 2023 06:54 )             24.5     01-26    139  |  106  |  27<H>  ----------------------------<  107<H>  3.9   |  25  |  1.83<H>    Ca    8.0<L>      26 Jan 2023 06:54    TPro  5.5<L>  /  Alb  2.7<L>  /  TBili  2.3<H>  /  DBili  x   /  AST  16  /  ALT  12  /  AlkPhos  58  01-26        CAPILLARY BLOOD GLUCOSE              I&O's Summary    25 Jan 2023 07:01  -  26 Jan 2023 07:00  --------------------------------------------------------  IN: 1200 mL / OUT: 1650 mL / NET: -450 mL        RADIOLOGY & ADDITIONAL TESTS:    Imaging Personally Reviewed:  [x ] YES  [ ] NO    Consultant(s) Notes Reviewed:  [x ] YES  [ ] NO    Care Discussed with Consultants/Other Providers [ x] YES  [ ] NO

## 2023-01-26 NOTE — DISCHARGE NOTE NURSING/CASE MANAGEMENT/SOCIAL WORK - OTHER MODE OF TRANSPORTATION
You are being transported upon your discharge from the hospital via ambulance/ambulette- [facility] [phone]

## 2023-01-26 NOTE — PROGRESS NOTE ADULT - SUBJECTIVE AND OBJECTIVE BOX
Subjective: L hip stiffness.       MEDICATIONS  (STANDING):  acetaminophen     Tablet .. 1000 milliGRAM(s) Oral every 8 hours  acetaminophen   IVPB .. 1000 milliGRAM(s) IV Intermittent once  amLODIPine   Tablet 10 milliGRAM(s) Oral daily  atorvastatin 10 milliGRAM(s) Oral at bedtime  ceFAZolin   IVPB 2000 milliGRAM(s) IV Intermittent once  chlorhexidine 2% Cloths 1 Application(s) Topical once  cinacalcet 30 milliGRAM(s) Oral daily  enoxaparin Injectable 40 milliGRAM(s) SubCutaneous every 24 hours  lactated ringers. 1000 milliLiter(s) (75 mL/Hr) IV Continuous <Continuous>  lactated ringers. 1000 milliLiter(s) (100 mL/Hr) IV Continuous <Continuous>  methimazole 5 milliGRAM(s) Oral daily  multivitamin 1 Tablet(s) Oral daily  pantoprazole    Tablet 40 milliGRAM(s) Oral before breakfast  PARoxetine 20 milliGRAM(s) Oral daily  PARoxetine 30 milliGRAM(s) Oral daily  polyethylene glycol 3350 17 Gram(s) Oral at bedtime  senna 2 Tablet(s) Oral at bedtime  sodium chloride 0.9%. 1000 milliLiter(s) (100 mL/Hr) IV Continuous <Continuous>  sucralfate suspension 1 Gram(s) Oral two times a day  tamsulosin 0.8 milliGRAM(s) Oral at bedtime    MEDICATIONS  (PRN):  HYDROmorphone  Injectable 0.25 milliGRAM(s) IV Push every 10 minutes PRN Moderate Pain (4 - 6)  HYDROmorphone  Injectable 0.5 milliGRAM(s) IV Push every 10 minutes PRN Severe Pain (7 - 10)  magnesium hydroxide Suspension 30 milliLiter(s) Oral daily PRN Constipation  ondansetron Injectable 4 milliGRAM(s) IV Push every 6 hours PRN Nausea and/or Vomiting  ondansetron Injectable 4 milliGRAM(s) IV Push once PRN Nausea and/or Vomiting  oxyCODONE    IR 5 milliGRAM(s) Oral every 3 hours PRN Moderate Pain (4 - 6)  oxyCODONE    IR 10 milliGRAM(s) Oral every 3 hours PRN Severe Pain (7 - 10)  oxyCODONE    IR 5 milliGRAM(s) Oral every 4 hours PRN Moderate Pain (4 - 6)          T(C): 36.7 (01-26-23 @ 07:55), Max: 37.1 (01-25-23 @ 23:30)  HR: 93 (01-26-23 @ 07:55) (89 - 99)  BP: 130/73 (01-26-23 @ 07:55) (121/68 - 139/77)  RR: 18 (01-26-23 @ 07:55) (18 - 18)  SpO2: 95% (01-26-23 @ 07:55) (95% - 97%)  Wt(kg): --        I&O's Detail    25 Jan 2023 07:01  -  26 Jan 2023 07:00  --------------------------------------------------------  IN:    Lactated Ringers: 1200 mL  Total IN: 1200 mL    OUT:    Voided (mL): 1650 mL  Total OUT: 1650 mL    Total NET: -450 mL               PHYSICAL EXAM:    GENERAL: NAD  NECK: Supple, no inc in JVP  CHEST/LUNG: Clear  HEART: S1S2  ABDOMEN: Soft, Nontender, Nondistended; Bowel sounds present  EXTREMITIES:  no edema. L hip post op dressing.       LABS:  CBC Full  -  ( 26 Jan 2023 06:54 )  WBC Count : 11.70 K/uL  RBC Count : 2.84 M/uL  Hemoglobin : 8.1 g/dL  Hematocrit : 24.5 %  Platelet Count - Automated : 136 K/uL  Mean Cell Volume : 86.3 fl  Mean Cell Hemoglobin : 28.5 pg  Mean Cell Hemoglobin Concentration : 33.1 gm/dL  Auto Neutrophil # : x  Auto Lymphocyte # : x  Auto Monocyte # : x  Auto Eosinophil # : x  Auto Basophil # : x  Auto Neutrophil % : x  Auto Lymphocyte % : x  Auto Monocyte % : x  Auto Eosinophil % : x  Auto Basophil % : x    01-26    139  |  106  |  27<H>  ----------------------------<  107<H>  3.9   |  25  |  1.83<H>    Ca    8.0<L>      26 Jan 2023 06:54    TPro  5.5<L>  /  Alb  2.7<L>  /  TBili  2.3<H>  /  DBili  x   /  AST  16  /  ALT  12  /  AlkPhos  58  01-26      ASSESSMENT:  1.  CKD stage 3 @ baseline. Trivial rise in Cr  2.  Primary HPT. Developing hypoCa on Sensipar  3.  Hip fracture. S/p L hip ORIF on 1/24    PLAN:  May stop IVFs  Dec Sensipar to once daily  BMP tomorrow.   May need to stop calcimimetic if corrected Ca drops below 8.5

## 2023-01-26 NOTE — DISCHARGE NOTE NURSING/CASE MANAGEMENT/SOCIAL WORK - CAREGIVER ADDRESS
[address] [facility] [id number] [address] [facility]. [id number]

## 2023-01-26 NOTE — DISCHARGE NOTE NURSING/CASE MANAGEMENT/SOCIAL WORK - PATIENT PORTAL LINK FT
You can access the FollowMyHealth Patient Portal offered by [facility] by registering at the following website: [email or web address]. By joining [facility]’s FollowMyHealth portal, you will also be able to view your health information using other applications (apps) compatible with our system.

## 2023-01-26 NOTE — DISCHARGE NOTE NURSING/CASE MANAGEMENT/SOCIAL WORK - NSDCCRNAME_GEN_ALL_CORE_FT
[facility] ([facility])   [address] [place] [address]

## 2023-01-27 LAB
ALBUMIN SERPL ELPH-MCNC: 2.8 G/DL — LOW (ref 3.3–5)
ALP SERPL-CCNC: 82 U/L — SIGNIFICANT CHANGE UP (ref 30–120)
ALT FLD-CCNC: 20 U/L DA — SIGNIFICANT CHANGE UP (ref 10–60)
ANION GAP SERPL CALC-SCNC: 7 MMOL/L — SIGNIFICANT CHANGE UP (ref 5–17)
ANION GAP SERPL CALC-SCNC: 7 MMOL/L — SIGNIFICANT CHANGE UP (ref 5–17)
AST SERPL-CCNC: 30 U/L — SIGNIFICANT CHANGE UP (ref 10–40)
BASOPHILS # BLD AUTO: 0.05 K/UL — SIGNIFICANT CHANGE UP (ref 0–0.2)
BASOPHILS NFR BLD AUTO: 0.6 % — SIGNIFICANT CHANGE UP (ref 0–2)
BILIRUB SERPL-MCNC: 1.4 MG/DL — HIGH (ref 0.2–1.2)
BLD GP AB SCN SERPL QL: SIGNIFICANT CHANGE UP
BUN SERPL-MCNC: 25 MG/DL — HIGH (ref 7–23)
BUN SERPL-MCNC: 26 MG/DL — HIGH (ref 7–23)
CALCIUM SERPL-MCNC: 8.4 MG/DL — SIGNIFICANT CHANGE UP (ref 8.4–10.5)
CALCIUM SERPL-MCNC: 8.6 MG/DL — SIGNIFICANT CHANGE UP (ref 8.4–10.5)
CHLORIDE SERPL-SCNC: 104 MMOL/L — SIGNIFICANT CHANGE UP (ref 96–108)
CHLORIDE SERPL-SCNC: 106 MMOL/L — SIGNIFICANT CHANGE UP (ref 96–108)
CO2 SERPL-SCNC: 27 MMOL/L — SIGNIFICANT CHANGE UP (ref 22–31)
CO2 SERPL-SCNC: 27 MMOL/L — SIGNIFICANT CHANGE UP (ref 22–31)
CREAT SERPL-MCNC: 1.55 MG/DL — HIGH (ref 0.5–1.3)
CREAT SERPL-MCNC: 1.59 MG/DL — HIGH (ref 0.5–1.3)
EGFR: 45 ML/MIN/1.73M2 — LOW
EGFR: 46 ML/MIN/1.73M2 — LOW
EOSINOPHIL # BLD AUTO: 0.29 K/UL — SIGNIFICANT CHANGE UP (ref 0–0.5)
EOSINOPHIL NFR BLD AUTO: 3.3 % — SIGNIFICANT CHANGE UP (ref 0–6)
GLUCOSE SERPL-MCNC: 105 MG/DL — HIGH (ref 70–99)
GLUCOSE SERPL-MCNC: 92 MG/DL — SIGNIFICANT CHANGE UP (ref 70–99)
HCT VFR BLD CALC: 21.7 % — LOW (ref 39–50)
HCT VFR BLD CALC: 24.3 % — LOW (ref 39–50)
HCT VFR BLD CALC: 28 % — LOW (ref 39–50)
HGB BLD-MCNC: 7.3 G/DL — LOW (ref 13–17)
HGB BLD-MCNC: 7.9 G/DL — LOW (ref 13–17)
HGB BLD-MCNC: 9.5 G/DL — LOW (ref 13–17)
IMM GRANULOCYTES NFR BLD AUTO: 0.3 % — SIGNIFICANT CHANGE UP (ref 0–0.9)
LYMPHOCYTES # BLD AUTO: 1.43 K/UL — SIGNIFICANT CHANGE UP (ref 1–3.3)
LYMPHOCYTES # BLD AUTO: 16.2 % — SIGNIFICANT CHANGE UP (ref 13–44)
MCHC RBC-ENTMCNC: 28.2 PG — SIGNIFICANT CHANGE UP (ref 27–34)
MCHC RBC-ENTMCNC: 28.8 PG — SIGNIFICANT CHANGE UP (ref 27–34)
MCHC RBC-ENTMCNC: 28.9 PG — SIGNIFICANT CHANGE UP (ref 27–34)
MCHC RBC-ENTMCNC: 32.5 GM/DL — SIGNIFICANT CHANGE UP (ref 32–36)
MCHC RBC-ENTMCNC: 33.6 GM/DL — SIGNIFICANT CHANGE UP (ref 32–36)
MCHC RBC-ENTMCNC: 33.9 GM/DL — SIGNIFICANT CHANGE UP (ref 32–36)
MCV RBC AUTO: 84.8 FL — SIGNIFICANT CHANGE UP (ref 80–100)
MCV RBC AUTO: 85.8 FL — SIGNIFICANT CHANGE UP (ref 80–100)
MCV RBC AUTO: 86.8 FL — SIGNIFICANT CHANGE UP (ref 80–100)
MONOCYTES # BLD AUTO: 0.72 K/UL — SIGNIFICANT CHANGE UP (ref 0–0.9)
MONOCYTES NFR BLD AUTO: 8.1 % — SIGNIFICANT CHANGE UP (ref 2–14)
NEUTROPHILS # BLD AUTO: 6.32 K/UL — SIGNIFICANT CHANGE UP (ref 1.8–7.4)
NEUTROPHILS NFR BLD AUTO: 71.5 % — SIGNIFICANT CHANGE UP (ref 43–77)
NRBC # BLD: 0 /100 WBCS — SIGNIFICANT CHANGE UP (ref 0–0)
PLATELET # BLD AUTO: 138 K/UL — LOW (ref 150–400)
PLATELET # BLD AUTO: 161 K/UL — SIGNIFICANT CHANGE UP (ref 150–400)
PLATELET # BLD AUTO: 171 K/UL — SIGNIFICANT CHANGE UP (ref 150–400)
POTASSIUM SERPL-MCNC: 3.9 MMOL/L — SIGNIFICANT CHANGE UP (ref 3.5–5.3)
POTASSIUM SERPL-MCNC: 4 MMOL/L — SIGNIFICANT CHANGE UP (ref 3.5–5.3)
POTASSIUM SERPL-SCNC: 3.9 MMOL/L — SIGNIFICANT CHANGE UP (ref 3.5–5.3)
POTASSIUM SERPL-SCNC: 4 MMOL/L — SIGNIFICANT CHANGE UP (ref 3.5–5.3)
PROT SERPL-MCNC: 6 G/DL — SIGNIFICANT CHANGE UP (ref 6–8.3)
RBC # BLD: 2.53 M/UL — LOW (ref 4.2–5.8)
RBC # BLD: 2.8 M/UL — LOW (ref 4.2–5.8)
RBC # BLD: 3.3 M/UL — LOW (ref 4.2–5.8)
RBC # FLD: 15 % — HIGH (ref 10.3–14.5)
RBC # FLD: 15.1 % — HIGH (ref 10.3–14.5)
RBC # FLD: 15.1 % — HIGH (ref 10.3–14.5)
SODIUM SERPL-SCNC: 138 MMOL/L — SIGNIFICANT CHANGE UP (ref 135–145)
SODIUM SERPL-SCNC: 140 MMOL/L — SIGNIFICANT CHANGE UP (ref 135–145)
WBC # BLD: 6.43 K/UL — SIGNIFICANT CHANGE UP (ref 3.8–10.5)
WBC # BLD: 8.65 K/UL — SIGNIFICANT CHANGE UP (ref 3.8–10.5)
WBC # BLD: 8.84 K/UL — SIGNIFICANT CHANGE UP (ref 3.8–10.5)
WBC # FLD AUTO: 6.43 K/UL — SIGNIFICANT CHANGE UP (ref 3.8–10.5)
WBC # FLD AUTO: 8.65 K/UL — SIGNIFICANT CHANGE UP (ref 3.8–10.5)
WBC # FLD AUTO: 8.84 K/UL — SIGNIFICANT CHANGE UP (ref 3.8–10.5)

## 2023-01-27 PROCEDURE — 93970 EXTREMITY STUDY: CPT | Mod: 26

## 2023-01-27 RX ADMIN — TAMSULOSIN HYDROCHLORIDE 0.8 MILLIGRAM(S): 0.4 CAPSULE ORAL at 21:55

## 2023-01-27 RX ADMIN — OXYCODONE HYDROCHLORIDE 5 MILLIGRAM(S): 5 TABLET ORAL at 10:40

## 2023-01-27 RX ADMIN — Medication 1000 MILLIGRAM(S): at 21:54

## 2023-01-27 RX ADMIN — ENOXAPARIN SODIUM 40 MILLIGRAM(S): 100 INJECTION SUBCUTANEOUS at 08:13

## 2023-01-27 RX ADMIN — AMLODIPINE BESYLATE 10 MILLIGRAM(S): 2.5 TABLET ORAL at 05:50

## 2023-01-27 RX ADMIN — Medication 1000 MILLIGRAM(S): at 05:50

## 2023-01-27 RX ADMIN — Medication 1000 MILLIGRAM(S): at 13:00

## 2023-01-27 RX ADMIN — ATORVASTATIN CALCIUM 10 MILLIGRAM(S): 80 TABLET, FILM COATED ORAL at 21:54

## 2023-01-27 RX ADMIN — Medication 1000 MILLIGRAM(S): at 05:53

## 2023-01-27 RX ADMIN — Medication 1000 MILLIGRAM(S): at 13:30

## 2023-01-27 RX ADMIN — Medication 1 TABLET(S): at 12:47

## 2023-01-27 RX ADMIN — CINACALCET 30 MILLIGRAM(S): 30 TABLET, FILM COATED ORAL at 12:47

## 2023-01-27 RX ADMIN — Medication 1000 MILLIGRAM(S): at 21:58

## 2023-01-27 RX ADMIN — Medication 30 MILLIGRAM(S): at 12:47

## 2023-01-27 RX ADMIN — PANTOPRAZOLE SODIUM 40 MILLIGRAM(S): 20 TABLET, DELAYED RELEASE ORAL at 05:50

## 2023-01-27 RX ADMIN — Medication 20 MILLIGRAM(S): at 12:47

## 2023-01-27 RX ADMIN — OXYCODONE HYDROCHLORIDE 5 MILLIGRAM(S): 5 TABLET ORAL at 10:02

## 2023-01-27 NOTE — CASE MANAGEMENT PROGRESS NOTE - NSCMPROGRESSNOTE_GEN_ALL_CORE
Anticipated discharge SAT 1/28/2023 via [facility] ambulette 2 person assist ( 2P ) . PATIENT SPOUSE TO PAY $ 104  (weekends its automatically an Ambulance)  [facility] - SOC 01/29/2023 , not discharge ready FRI received 1 unit PRBC ( #3 in all). RW + Commode pending insurance approval will be delivered to bedside from [facility] [phone].

## 2023-01-27 NOTE — PROGRESS NOTE ADULT - SUBJECTIVE AND OBJECTIVE BOX
Patient is a 77y old  Male who presents with a chief complaint of s/p fall left hip pain (27 Jan 2023 08:35)      INTERVAL HPI/OVERNIGHT EVENTS:overnight events noted    Home Medications:  acetaminophen 500 mg oral tablet: 2 tab(s) orally every 8 hours (25 Jan 2023 10:27)  aspirin 81 mg oral tablet, chewable: 1 tab(s) orally once a day.last dose 1/23/23 (24 Jan 2023 17:56)  Carafate 1 g/10 mL oral suspension: 10 milliliter(s) orally 2 times a day (24 Jan 2023 17:56)  Flonase 50 mcg/inh nasal spray: 2 spray(s) in each nostril once a day (24 Jan 2023 17:56)  methIMAzole 5 mg oral tablet: 0.5 tab(s) orally once a day (24 Jan 2023 17:56)  Multiple Vitamins oral tablet: 1 tab(s) orally once a day (25 Jan 2023 10:27)  omeprazole 20 mg oral delayed release capsule: 1 cap(s) orally once a day (24 Jan 2023 17:56)  PARoxetine 20 mg oral tablet: 1 tab(s) orally once a day  ***take with 30mg for TDD = 50mg*** (24 Jan 2023 17:56)  PARoxetine 30 mg oral tablet: 1 tab(s) orally once a day  ***take with 20mg for TDD = 50mg*** (24 Jan 2023 17:56)  polyethylene glycol 3350 oral powder for reconstitution: 17 gram(s) orally once a day (at bedtime) (25 Jan 2023 10:27)  pravastatin 20 mg oral tablet: 1 tab(s) orally once a day (at bedtime) (24 Jan 2023 17:56)  senna leaf extract oral tablet: 2 tab(s) orally once a day (at bedtime) (25 Jan 2023 10:27)  terazosin 5 mg oral capsule: 1 cap(s) orally once a day (at bedtime) (24 Jan 2023 17:56)      MEDICATIONS  (STANDING):  acetaminophen     Tablet .. 1000 milliGRAM(s) Oral every 8 hours  acetaminophen   IVPB .. 1000 milliGRAM(s) IV Intermittent once  amLODIPine   Tablet 10 milliGRAM(s) Oral daily  atorvastatin 10 milliGRAM(s) Oral at bedtime  ceFAZolin   IVPB 2000 milliGRAM(s) IV Intermittent once  chlorhexidine 2% Cloths 1 Application(s) Topical once  cinacalcet 30 milliGRAM(s) Oral daily  enoxaparin Injectable 40 milliGRAM(s) SubCutaneous every 24 hours  methimazole 2.5 milliGRAM(s) Oral daily  multivitamin 1 Tablet(s) Oral daily  pantoprazole    Tablet 40 milliGRAM(s) Oral before breakfast  PARoxetine 20 milliGRAM(s) Oral daily  PARoxetine 30 milliGRAM(s) Oral daily  polyethylene glycol 3350 17 Gram(s) Oral at bedtime  senna 2 Tablet(s) Oral at bedtime  sucralfate suspension 1 Gram(s) Oral two times a day  tamsulosin 0.8 milliGRAM(s) Oral at bedtime    MEDICATIONS  (PRN):  HYDROmorphone  Injectable 0.25 milliGRAM(s) IV Push every 10 minutes PRN Moderate Pain (4 - 6)  HYDROmorphone  Injectable 0.5 milliGRAM(s) IV Push every 10 minutes PRN Severe Pain (7 - 10)  magnesium hydroxide Suspension 30 milliLiter(s) Oral daily PRN Constipation  ondansetron Injectable 4 milliGRAM(s) IV Push every 6 hours PRN Nausea and/or Vomiting  ondansetron Injectable 4 milliGRAM(s) IV Push once PRN Nausea and/or Vomiting  oxyCODONE    IR 5 milliGRAM(s) Oral every 3 hours PRN Moderate Pain (4 - 6)  oxyCODONE    IR 10 milliGRAM(s) Oral every 3 hours PRN Severe Pain (7 - 10)      Allergies    ChlorproMAZINE Hydrochloride (Unknown)    Intolerances        REVIEW OF SYSTEMS:  CONSTITUTIONAL: No fever, weight loss, or fatigue  EYES: No eye pain, visual disturbances, or discharge  ENMT:  No difficulty hearing, tinnitus, vertigo; No sinus or throat pain  NECK: No pain or stiffness  BREASTS: No pain, masses, or nipple discharge  RESPIRATORY: No cough, wheezing, chills or hemoptysis; No shortness of breath  CARDIOVASCULAR: No chest pain, palpitations, dizziness, or leg swelling  GASTROINTESTINAL: No abdominal or epigastric pain. No nausea, vomiting, or hematemesis; No diarrhea or constipation. No melena or hematochezia.  GENITOURINARY: No dysuria, frequency, hematuria, or incontinence  NEUROLOGICAL: No headaches, memory loss, loss of strength, numbness, or tremors  SKIN: No itching, burning, rashes, or lesions   LYMPH NODES: No enlarged glands  ENDOCRINE: No heat or cold intolerance; No hair loss  MUSCULOSKELETAL: No joint pain or swelling; No muscle, back, or extremity pain  Vital Signs Last 24 Hrs  T(C): 37.1 (27 Jan 2023 08:11), Max: 37.2 (26 Jan 2023 23:30)  T(F): 98.8 (27 Jan 2023 08:11), Max: 98.9 (26 Jan 2023 23:30)  HR: 94 (27 Jan 2023 08:11) (94 - 105)  BP: 147/70 (27 Jan 2023 08:11) (125/73 - 149/86)  BP(mean): --  RR: 16 (27 Jan 2023 08:11) (16 - 18)  SpO2: 95% (27 Jan 2023 08:11) (94% - 96%)    Parameters below as of 27 Jan 2023 08:11  Patient On (Oxygen Delivery Method): room air        PHYSICAL EXAM:  GENERAL: NAD, well-groomed, well-developed  HEAD:  Atraumatic, Normocephalic  EYES: EOMI, PERRLA, conjunctiva and sclera clear  ENMT: Moist mucous membranes,   NECK: Supple, No JVD, Normal thyroid  NERVOUS SYSTEM:  Alert & Oriented X3,non focal  CHEST/LUNG: Clear to percussion bilaterally; No rales, rhonchi, wheezing, or rubs  HEART: Regular rate and rhythm; No murmurs, rubs, or gallops  ABDOMEN: Soft, Nontender, Nondistended; Bowel sounds present  EXTREMITIES:  2+ Peripheral Pulses, No clubbing, cyanosis, or edema  LABS:                        7.9    8.84  )-----------( 161      ( 27 Jan 2023 09:18 )             24.3     01-27    140  |  106  |  26<H>  ----------------------------<  92  3.9   |  27  |  1.59<H>    Ca    8.4      27 Jan 2023 06:00    TPro  5.5<L>  /  Alb  2.7<L>  /  TBili  2.3<H>  /  DBili  x   /  AST  16  /  ALT  12  /  AlkPhos  58  01-26        CAPILLARY BLOOD GLUCOSE              I&O's Summary    26 Jan 2023 07:01  -  27 Jan 2023 07:00  --------------------------------------------------------  IN: 0 mL / OUT: 800 mL / NET: -800 mL        RADIOLOGY & ADDITIONAL TESTS:    Imaging Personally Reviewed:  [x ] YES  [ ] NO    Consultant(s) Notes Reviewed:  [x ] YES  [ ] NO    Care Discussed with Consultants/Other Providers [x ] YES  [ ] NO

## 2023-01-27 NOTE — PROGRESS NOTE ADULT - SUBJECTIVE AND OBJECTIVE BOX
Subjective: no complaints. Receiving a unit of blood. Ca, Cr improved.       MEDICATIONS  (STANDING):  acetaminophen     Tablet .. 1000 milliGRAM(s) Oral every 8 hours  acetaminophen   IVPB .. 1000 milliGRAM(s) IV Intermittent once  amLODIPine   Tablet 10 milliGRAM(s) Oral daily  atorvastatin 10 milliGRAM(s) Oral at bedtime  ceFAZolin   IVPB 2000 milliGRAM(s) IV Intermittent once  chlorhexidine 2% Cloths 1 Application(s) Topical once  cinacalcet 30 milliGRAM(s) Oral daily  enoxaparin Injectable 40 milliGRAM(s) SubCutaneous every 24 hours  methimazole 2.5 milliGRAM(s) Oral daily  multivitamin 1 Tablet(s) Oral daily  pantoprazole    Tablet 40 milliGRAM(s) Oral before breakfast  PARoxetine 20 milliGRAM(s) Oral daily  PARoxetine 30 milliGRAM(s) Oral daily  polyethylene glycol 3350 17 Gram(s) Oral at bedtime  senna 2 Tablet(s) Oral at bedtime  sucralfate suspension 1 Gram(s) Oral two times a day  tamsulosin 0.8 milliGRAM(s) Oral at bedtime    MEDICATIONS  (PRN):  HYDROmorphone  Injectable 0.25 milliGRAM(s) IV Push every 10 minutes PRN Moderate Pain (4 - 6)  HYDROmorphone  Injectable 0.5 milliGRAM(s) IV Push every 10 minutes PRN Severe Pain (7 - 10)  magnesium hydroxide Suspension 30 milliLiter(s) Oral daily PRN Constipation  ondansetron Injectable 4 milliGRAM(s) IV Push every 6 hours PRN Nausea and/or Vomiting  ondansetron Injectable 4 milliGRAM(s) IV Push once PRN Nausea and/or Vomiting  oxyCODONE    IR 5 milliGRAM(s) Oral every 3 hours PRN Moderate Pain (4 - 6)  oxyCODONE    IR 10 milliGRAM(s) Oral every 3 hours PRN Severe Pain (7 - 10)          T(C): 36.9 (01-27-23 @ 14:05), Max: 37.2 (01-26-23 @ 23:30)  HR: 108 (01-27-23 @ 14:05) (94 - 118)  BP: 136/79 (01-27-23 @ 14:05) (136/79 - 156/70)  RR: 18 (01-27-23 @ 14:05) (16 - 18)  SpO2: 98% (01-27-23 @ 14:05) (94% - 98%)  Wt(kg): --        I&O's Detail    26 Jan 2023 07:01  -  27 Jan 2023 07:00  --------------------------------------------------------  IN:  Total IN: 0 mL    OUT:    Voided (mL): 800 mL  Total OUT: 800 mL    Total NET: -800 mL               PHYSICAL EXAM:    GENERAL: NAD  NECK: Supple, no inc in JVP  CHEST/LUNG: Clear  HEART: S1S2  ABDOMEN: Soft, Nontender, Nondistended; Bowel sounds present  EXTREMITIES:  min edema.       LABS:  CBC Full  -  ( 27 Jan 2023 09:18 )  WBC Count : 8.84 K/uL  RBC Count : 2.80 M/uL  Hemoglobin : 7.9 g/dL  Hematocrit : 24.3 %  Platelet Count - Automated : 161 K/uL  Mean Cell Volume : 86.8 fl  Mean Cell Hemoglobin : 28.2 pg  Mean Cell Hemoglobin Concentration : 32.5 gm/dL  Auto Neutrophil # : 6.32 K/uL  Auto Lymphocyte # : 1.43 K/uL  Auto Monocyte # : 0.72 K/uL  Auto Eosinophil # : 0.29 K/uL  Auto Basophil # : 0.05 K/uL  Auto Neutrophil % : 71.5 %  Auto Lymphocyte % : 16.2 %  Auto Monocyte % : 8.1 %  Auto Eosinophil % : 3.3 %  Auto Basophil % : 0.6 %    01-27    140  |  106  |  26<H>  ----------------------------<  92  3.9   |  27  |  1.59<H>    Ca    8.4      27 Jan 2023 06:00    TPro  5.5<L>  /  Alb  2.7<L>  /  TBili  2.3<H>  /  DBili  x   /  AST  16  /  ALT  12  /  AlkPhos  58  01-26        ASSESSMENT:  1.  CKD stage 3 @ baseline. Mild pre-renal component resolved.   2.  Primary HPT. Developing hypoCa on Sensipar  3.  Hip fracture. S/p L hip ORIF on 1/24    PLAN:  Cont Sensipar once daily  May need to stop calcimimetic if corrected Ca drops below 8.5  Stable for dc from renal POV

## 2023-01-27 NOTE — PROGRESS NOTE ADULT - SUBJECTIVE AND OBJECTIVE BOX
POST OPERATIVE DAY #:  3  STATUS POST: Left Hip ORIF                       SUBJECTIVE: Patient seen and examined.  Ambulated with PT. Tolerating diet. Voiding and BM this am. No other complaints.  Reported Pain score = 1/10 at rest and 5/10 when moving. Tolerable and controlled on regimen  Denies: CP/SOB/N/V/Numbness/Tingling    OBJECTIVE:     Vital Signs Last 24 Hrs  T(C): 37.1 (27 Jan 2023 08:11), Max: 37.2 (26 Jan 2023 23:30)  T(F): 98.8 (27 Jan 2023 08:11), Max: 98.9 (26 Jan 2023 23:30)  HR: 94 (27 Jan 2023 08:11) (94 - 105)  BP: 147/70 (27 Jan 2023 08:11) (125/73 - 149/86)  RR: 16 (27 Jan 2023 08:11) (16 - 18)  SpO2: 95% (27 Jan 2023 08:11) (94% - 96%)    Parameters below as of 27 Jan 2023 08:11  Patient On (Oxygen Delivery Method): room air      Gen: AAOx3  Abd: soft, NT, ND  Left hip:          SilverlonDressing: clean/dry/intact    Bilateral LEs:         Sensation:  intact to light touch          Motor exam:  5/5 dorsiflexion/plantarflexion/EHL          2+ DP pulses          calf supple, NT, warm and well perfused, cap refill <2s         SCDs in place    LABS:                        8.1    11.70 )-----------( 136      ( 26 Jan 2023 06:54 )             24.5     01-27    140  |  106  |  26<H>  ----------------------------<  92  3.9   |  27  |  1.59<H>    Ca    8.4      27 Jan 2023 06:00    TPro  5.5<L>  /  Alb  2.7<L>  /  TBili  2.3<H>  /  DBili  x   /  AST  16  /  ALT  12  /  AlkPhos  58  01-26          MEDICATIONS:  Anticoagulation:  enoxaparin Injectable 40 milliGRAM(s) SubCutaneous every 24 hours      Pain medications:   acetaminophen     Tablet .. 1000 milliGRAM(s) Oral every 8 hours  acetaminophen   IVPB .. 1000 milliGRAM(s) IV Intermittent once  oxyCODONE    IR 5 milliGRAM(s) Oral every 3 hours PRN  oxyCODONE    IR 10 milliGRAM(s) Oral every 3 hours PRN          A/P :  76yo M  s/p Left Hip ORIF POD # 3  -    Pain control: Acetaminophen, Celebrex, Oxycodone, Dilaudid   -    DVT ppx: Lovenox 40 q daily  -    Weight bearing status: WBAT   -    Cryotherapy with barrier  -    Physical Therapy  -    Occupational Therapy  -    Discharge plan:    home vs [facility] depending discussion with family and  pending medical/PT/OT clearance, insurance authorization, bed availability.

## 2023-01-27 NOTE — CONSULT NOTE ADULT - CONSULT REASON
CKD
Pre-operative evaluation
Post ORIF Anemia and tachycardia
anemia, precipitous dropin Hct, heme clearance for Ortho surgery
femur fracture, hyperparathyroidism

## 2023-01-27 NOTE — CONSULT NOTE ADULT - ASSESSMENT
77 year old male PMHx Prostate Ca (underwent no Tx), Hyperthyroid, CKD, HTN, HLD, Recent Dx of Hypercalcemia and Parathyroid Nodule.  While at [facility], [place] in preparation for planned Parathyroidectomy patient had trip and fall with resulting Left Hip Fx.  Now POD 3 s/p ORIF L Hpi Fx. Course complicated by anemia and Sinus tachycardia -120.      Pain well controlled with Tylenol/ Celebrex, Oxy and Dilaudid   Unlabored on Room air    ST on monitor - likely multifactorial Pain / Anxiety  S/P 1 unit PRBC with good response   Hgb 7.9 --> 9.5  Trend Hgb   No signs of active bleeding   Family had concern for VTE  given ST  LE Dopplers done and were Neg   On  Chemical DVT ppx with Lovenox and SCD's  Voiding and ambulating well - tolerating diet   Watch Lytes    Cont DC planing   Patient and family reassured - answered questions and addressed concerns   No Role for ICU level of care at this time.

## 2023-01-27 NOTE — PROGRESS NOTE ADULT - SUBJECTIVE AND OBJECTIVE BOX
Chief Complaint: Fall    Interval Events: No events overnight.    Review of Systems:  General: No fevers, chills, weight gain  Skin: No rashes, color changes  Cardiovascular: No chest pain, orthopnea  Respiratory: No shortness of breath, cough  Gastrointestinal: No nausea, abdominal pain  Genitourinary: No incontinence, pain with urination  Musculoskeletal: No pain, swelling, decreased range of motion  Neurological: No headache, weakness  Psychiatric: No depression, anxiety  Endocrine: No weight gain, increased thirst  All other systems are comprehensively negative.    Physical Exam:  Vital Signs Last 24 Hrs  T(C): 37.1 (27 Jan 2023 08:11), Max: 37.2 (26 Jan 2023 23:30)  T(F): 98.8 (27 Jan 2023 08:11), Max: 98.9 (26 Jan 2023 23:30)  HR: 94 (27 Jan 2023 08:11) (94 - 105)  BP: 147/70 (27 Jan 2023 08:11) (125/73 - 149/86)  BP(mean): --  RR: 16 (27 Jan 2023 08:11) (16 - 18)  SpO2: 95% (27 Jan 2023 08:11) (94% - 96%)  Parameters below as of 27 Jan 2023 08:11  Patient On (Oxygen Delivery Method): room air  General: NAD  HEENT: MMM  Neck: No JVD, no carotid bruit  Lungs: CTAB  CV: RRR, nl S1/S2, no M/R/G  Abdomen: S/NT/ND, +BS  Extremities: No LE edema, no cyanosis  Neuro: AAOx3, non-focal  Skin: No rash    Labs:             01-27    140  |  106  |  26<H>  ----------------------------<  92  3.9   |  27  |  1.59<H>    Ca    8.4      27 Jan 2023 06:00    TPro  5.5<L>  /  Alb  2.7<L>  /  TBili  2.3<H>  /  DBili  x   /  AST  16  /  ALT  12  /  AlkPhos  58  01-26                        7.9    8.84  )-----------( 161      ( 27 Jan 2023 09:18 )             24.3

## 2023-01-27 NOTE — CONSULT NOTE ADULT - SUBJECTIVE AND OBJECTIVE BOX
Patient is a 77y old  Male who presents with a chief complaint of s/p fall left hip pain (27 Jan 2023 15:40)      BRIEF HOSPITAL COURSE: 77 year old male PMHx Prostate Ca (underwent no Tx), Hyperthyroid, CKD, HTN, HLD, Recent Dx of Hypercalcemia and Parathyroid Nodule.  While at [facility] in preparation for planned Parathyroidectomy patient had trip and fall with resulting Left Hip Fx.  Now POD 3 s/p ORIF L Hpi Fx. Course complicated by anemia and Sinus tachycardia -120.    Events last 24 hours: S/P 1 unit PRBC, Afebrile, Ambulating, tolerating diet, Voiding and +BM,  No complaints on exam.      PAST MEDICAL & SURGICAL HISTORY:  Hypertension  Hyperlipidemia  HTN (hypertension)  Hyperlipidemia  History of hyperthyroidism  OCD (obsessive compulsive disorder)  Prostate cancer  Chronic kidney disease, unspecified CKD stage  Hyperparathyroidism  H/O colonoscopy  H/O endoscopy          Review of Systems:  CONSTITUTIONAL: No fever, chills, or fatigue  EYES: No eye pain, visual disturbances, or discharge  ENMT:  No difficulty hearing, tinnitus, vertigo; No sinus or throat pain  NECK: No pain or stiffness  RESPIRATORY: No cough, wheezing, chills or hemoptysis; No shortness of breath  CARDIOVASCULAR: No chest pain, palpitations, dizziness, or leg swelling  GASTROINTESTINAL: No abdominal or epigastric pain. No nausea, vomiting, or hematemesis; No diarrhea or constipation. No melena or hematochezia.  GENITOURINARY: No dysuria, frequency, hematuria, or incontinence  NEUROLOGICAL: No headaches, memory loss, loss of strength, numbness, or tremors  SKIN: No itching, burning, rashes, or lesions   MUSCULOSKELETAL: No joint pain or swelling; No muscle, back, or extremity pain  PSYCHIATRIC: No depression, anxiety, mood swings, or difficulty sleeping      Medications:  ceFAZolin   IVPB 2000 milliGRAM(s) IV Intermittent once  amLODIPine   Tablet 10 milliGRAM(s) Oral daily  acetaminophen     Tablet .. 1000 milliGRAM(s) Oral every 8 hours  acetaminophen   IVPB .. 1000 milliGRAM(s) IV Intermittent once  HYDROmorphone  Injectable 0.25 milliGRAM(s) IV Push every 10 minutes PRN  HYDROmorphone  Injectable 0.5 milliGRAM(s) IV Push every 10 minutes PRN  ondansetron Injectable 4 milliGRAM(s) IV Push every 6 hours PRN  ondansetron Injectable 4 milliGRAM(s) IV Push once PRN  oxyCODONE    IR 5 milliGRAM(s) Oral every 3 hours PRN  oxyCODONE    IR 10 milliGRAM(s) Oral every 3 hours PRN  PARoxetine 20 milliGRAM(s) Oral daily  PARoxetine 30 milliGRAM(s) Oral daily  enoxaparin Injectable 40 milliGRAM(s) SubCutaneous every 24 hours  magnesium hydroxide Suspension 30 milliLiter(s) Oral daily PRN  pantoprazole    Tablet 40 milliGRAM(s) Oral before breakfast  polyethylene glycol 3350 17 Gram(s) Oral at bedtime  senna 2 Tablet(s) Oral at bedtime  sucralfate suspension 1 Gram(s) Oral two times a day  tamsulosin 0.8 milliGRAM(s) Oral at bedtime  atorvastatin 10 milliGRAM(s) Oral at bedtime  cinacalcet 30 milliGRAM(s) Oral daily  methimazole 2.5 milliGRAM(s) Oral daily  multivitamin 1 Tablet(s) Oral daily  chlorhexidine 2% Cloths 1 Application(s) Topical once            ICU Vital Signs Last 24 Hrs  T(C): 37.3 (27 Jan 2023 23:36), Max: 37.3 (27 Jan 2023 23:36)  T(F): 99.2 (27 Jan 2023 23:36), Max: 99.2 (27 Jan 2023 23:36)  HR: 113 (27 Jan 2023 23:36) (94 - 120)  BP: 162/85 (27 Jan 2023 23:36) (136/79 - 162/85)  RR: 17 (27 Jan 2023 23:36) (16 - 18)  SpO2: 93% (27 Jan 2023 23:36) (93% - 99%)    O2 Parameters below as of 27 Jan 2023 23:36  Patient On (Oxygen Delivery Method): room air                I&O's Detail    26 Jan 2023 07:01  -  27 Jan 2023 07:00  --------------------------------------------------------  IN:  Total IN: 0 mL    OUT:    Voided (mL): 800 mL  Total OUT: 800 mL    Total NET: -800 mL            LABS:                        9.5    8.65  )-----------( 171      ( 27 Jan 2023 20:15 )             28.0     01-27    138  |  104  |  25<H>  ----------------------------<  105<H>  4.0   |  27  |  1.55<H>    Ca    8.6      27 Jan 2023 19:35    TPro  6.0  /  Alb  2.8<L>  /  TBili  1.4<H>  /  DBili  x   /  AST  30  /  ALT  20  /  AlkPhos  82  01-27          CAPILLARY BLOOD GLUCOSE            CULTURES:      Physical Examination:    General:  Awake.  Alert, oriented, interactive, nonfocal    HEENT: Pupils equal, reactive to light.  Symmetric. No JVD.    PULM: Clear to auscultation bilaterally, no significant sputum production    CVS: Regular rate and rhythm, no murmurs, rubs, or gallops    ABD: Soft, nondistended, nontender, normoactive bowel sounds, no masses    EXT: No edema, nontender, Left Hpi site with dressing, No Erythema, No hematoma, NVI    SKIN: Warm and well perfused, no rashes noted.    RADIOLOGY:   < from: US Duplex Venous Lower Ext Complete, Bilateral (01.27.23 @ 21:25) >    ACC: [id number] EXAM:  US DPLX LWR EXT VEINS COMPL BI   ORDERED BY: LAVERNE FERNANDEZ     PROCEDURE DATE:  01/27/2023          INTERPRETATION:  CLINICAL INFORMATION: Bilateral leg pain after hip   surgery    COMPARISON: None available.    TECHNIQUE: Duplex sonography of the BILATERAL LOWER extremity veins with   color and spectral Doppler, with and without compression.    FINDINGS:    RIGHT:  Normal compressibility of the RIGHT common femoral, femoral and popliteal   veins.  Doppler examination shows normal spontaneous and phasic flow.  No RIGHT calf vein thrombosis is detected.    LEFT:  Normal compressibility of the LEFT common femoral, femoral and popliteal   veins.  Doppler examination shows normal spontaneous and phasic flow.  No LEFT calf vein thrombosis is detected.    IMPRESSION:  No evidence of deep venous thrombosis in either lower extremity.    < end of copied text >        (Assessing presenting problems of acute illness, which pose high probability of life threatening deterioration or end organ damage/dysfunction, as well as medical decision making including initiating plan of care, reviewing data, reviewing radiologic exams, discussing with multidisciplinary team,  discussing goals of care with patient/family, and writing this note.  Non-inclusive of procedures performed)  
History of Present Illness: The patient is a 77 year old male with a history of HTN, HL, CKD, hyperparathyroidism, prostate cancer who presents with a fall. He was at pre-surgical testing when he hit a scale and fell down. No chest pain, dizziness, shortness of breath. No exertional symptoms prior to the fall. He states he had an echo and stress test about 3 years ago that were normal.    Past Medical/Surgical History:  HTN, HL, CKD, hyperparathyroidism, prostate cancer     Medications:  Home Medications:  aspirin 81 mg oral tablet, chewable: 1 tab(s) orally once a day.last dose 1/23/23 (23 Jan 2023 17:52)  Carafate 1 g/10 mL oral suspension: 10 milliliter(s) orally 2 times a day (23 Jan 2023 17:52)  Flonase 50 mcg/inh nasal spray: 2 spray(s) in each nostril once a day (23 Jan 2023 17:52)  methIMAzole 5 mg oral tablet: 0.5 tab(s) orally once a day (23 Jan 2023 17:52)  omeprazole 20 mg oral delayed release capsule: 1 cap(s) orally once a day (23 Jan 2023 17:52)  PARoxetine 20 mg oral tablet: 1 tab(s) orally once a day  ***take with 30mg for TDD = 50mg*** (23 Jan 2023 17:52)  PARoxetine 30 mg oral tablet: 1 tab(s) orally once a day  ***take with 20mg for TDD = 50mg*** (23 Jan 2023 17:52)  pravastatin 20 mg oral tablet: 1 tab(s) orally once a day (at bedtime) (23 Jan 2023 17:52)  terazosin 5 mg oral capsule: 1 cap(s) orally once a day (at bedtime) (23 Jan 2023 17:52)      Family History: Non-contributory family history of premature cardiovascular atherosclerotic disease    Social History: No tobacco, alcohol or drug use    Review of Systems:  General: No fevers, chills, weight gain  Skin: No rashes, color changes  Cardiovascular: No chest pain, orthopnea  Respiratory: No shortness of breath, cough  Gastrointestinal: No nausea, abdominal pain  Genitourinary: No incontinence, pain with urination  Musculoskeletal: +pain, swelling, decreased range of motion  Neurological: No headache, weakness  Psychiatric: No depression, anxiety  Endocrine: No weight gain, increased thirst  All other systems are comprehensively negative.    Physical Exam:  Vitals:        Vital Signs Last 24 Hrs  T(C): 37.3 (24 Jan 2023 08:00), Max: 37.3 (24 Jan 2023 08:00)  T(F): 99.2 (24 Jan 2023 08:00), Max: 99.2 (24 Jan 2023 08:00)  HR: 90 (24 Jan 2023 08:00) (86 - 100)  BP: 154/75 (24 Jan 2023 08:00) (128/80 - 166/83)  BP(mean): 96 (23 Jan 2023 14:18) (96 - 96)  RR: 18 (24 Jan 2023 08:00) (15 - 18)  SpO2: 97% (24 Jan 2023 08:00) (97% - 100%)  Parameters below as of 24 Jan 2023 08:00  Patient On (Oxygen Delivery Method): room air  General: NAD  HEENT: MMM  Neck: No JVD, no carotid bruit  Lungs: CTAB  CV: RRR, nl S1/S2, no M/R/G  Abdomen: S/NT/ND, +BS  Extremities: No LE edema, no cyanosis  Neuro: AAOx3, non-focal  Skin: No rash    Labs:                        10.4   10.30 )-----------( 201      ( 23 Jan 2023 17:40 )             31.8     01-23    138  |  104  |  21  ----------------------------<  110<H>  3.4<L>   |  23  |  1.64<H>    Ca    9.3      23 Jan 2023 17:40    TPro  6.4  /  Alb  3.5  /  TBili  0.4  /  DBili  x   /  AST  19  /  ALT  30  /  AlkPhos  74  01-23        PT/INR - ( 23 Jan 2023 17:40 )   PT: 13.5 sec;   INR: 1.17 ratio         PTT - ( 23 Jan 2023 17:40 )  PTT:29.1 sec    ECG/Telemetry: NSR, LAD, LVH    
NEPHROLOGY CONSULTATION    CHIEF COMPLAINT:  CKD    HPI:  Admitted after a fall resulting in hip fracture.  Had recent admission to [facility] with severe hypercalcemia due to primary HPT.  Treated with IVF, calcitonin, pamidronate and cinacalcet with improvement.  His renal function also improved as calcium came down Cr 2.5 to 1.5 mg/dL.        ROS:  denies CP, SOB      PAST MEDICAL & SURGICAL HISTORY:  Hypertension  Hyperlipidemia  HTN (hypertension)  Hyperlipidemia  History of hyperthyroidism  OCD (obsessive compulsive disorder)  Prostate cancer  Chronic kidney disease, unspecified CKD stage 3  Hyperparathyroidism  H/O colonoscopy  H/O endoscopy      SOCIAL HISTORY:  NA    FAMILY HISTORY:  NA      MEDICATIONS  (STANDING):  amLODIPine   Tablet 10 milliGRAM(s) Oral daily  atorvastatin 10 milliGRAM(s) Oral at bedtime  cinacalcet 30 milliGRAM(s) Oral two times a day  methimazole 5 milliGRAM(s) Oral daily  pantoprazole    Tablet 40 milliGRAM(s) Oral before breakfast  PARoxetine 20 milliGRAM(s) Oral daily  sodium chloride 0.9%. 1000 milliLiter(s) (100 mL/Hr) IV Continuous <Continuous>  sucralfate suspension 1 Gram(s) Oral two times a day  tamsulosin 0.8 milliGRAM(s) Oral at bedtime      PHYSICAL EXAMINATION:  T(F): 98.6 (01-24-23 @ 13:20)  HR: 120 (01-24-23 @ 13:20)  BP: 138/74 (01-24-23 @ 13:20)  RR: 15 (01-24-23 @ 13:20)  SpO2: 98% (01-24-23 @ 13:20)  Conversant, no apparent distress  PERRLA, pink conjunctivae, no ptosis  Good dentition, no pharyngeal erythema  Neck non tender, no mass, no thyromegaly or nodules  Normal respiratory effort, lungs clear to auscultation  Heart with RRR, no murmurs or rubs, no peripheral edema  Abdomen soft, no masses, no organomegaly  Skin no rashes, ulcers or lesions, normal turgor and temperature  Appropriate affect, AO x 3    LABS:                        8.6    7.89  )-----------( 163      ( 24 Jan 2023 08:21 )             26.2     01-24    140  |  108  |  19  ----------------------------<  96  4.0   |  26  |  1.55<H>    Ca    8.8      24 Jan 2023 08:21    TPro  5.8<L>  /  Alb  3.0<L>  /  TBili  0.7  /  DBili  x   /  AST  15  /  ALT  35  /  AlkPhos  66  01-24        RADIOLOGY:  Chest X-Ray personally reviewed and shows NAPD      ASSESSMENT:  1.  CKD stage 3 @ baseline  2.  Recent severe hypercalcemia due to primary HPT, better   3.  Hip fracture    PLAN:  No nephrologic contraindications to planned surgery            
All records reviewed.    HPI:  76 yo man w hx prostate cancer (no tx), hyperthyroidism, CKD, HTN, HLD, r hypercalcemia (found to have parathyroid nodule),  hyperparathyroidism.  Pt was at [facility] for presurgical labs for the planned parathyroidectomy when tripped over object, fell, w left hip pain.  he then called ambulance to come to [place].  W/U here sig for left intertrochanteric fx of prox femur, seen by Ortho for planned surgery.    Labs-today 1/24 wbc 7.89 Hgb 8.5 ,cv 86.5 plts 163, yesterday on day of adm Hgb 10.4, old lab in computer 11/7 hgb 9.9    INR 1.17 PT 13.5 PTT 29.1    Pt reports no surgery in past but multiple teeth extraction without bleeding complications, no family hx bleeding dyscrasia, no abnormal or incr bleed.bruise hx. has chronic anemia      PAST MEDICAL & SURGICAL HISTORY:  Hypertension      Hyperlipidemia      HTN (hypertension)      Hyperlipidemia      History of hyperthyroidism      OCD (obsessive compulsive disorder)      Prostate cancer      Chronic kidney disease, unspecified CKD stage      Hyperparathyroidism      H/O colonoscopy      H/O endoscopy          Review of System:  see above    MEDICATIONS  (STANDING):  amLODIPine   Tablet 10 milliGRAM(s) Oral daily  atorvastatin 10 milliGRAM(s) Oral at bedtime  cinacalcet 30 milliGRAM(s) Oral two times a day  methimazole 5 milliGRAM(s) Oral daily  pantoprazole    Tablet 40 milliGRAM(s) Oral before breakfast  PARoxetine 20 milliGRAM(s) Oral daily  sodium chloride 0.9%. 1000 milliLiter(s) (100 mL/Hr) IV Continuous <Continuous>  sucralfate suspension 1 Gram(s) Oral two times a day  tamsulosin 0.8 milliGRAM(s) Oral at bedtime    MEDICATIONS  (PRN):  acetaminophen     Tablet .. 650 milliGRAM(s) Oral every 6 hours PRN Temp greater or equal to 38C (100.4F), Mild Pain (1 - 3)  morphine  - Injectable 2 milliGRAM(s) IV Push every 4 hours PRN Severe Pain (7 - 10)  oxyCODONE    IR 5 milliGRAM(s) Oral every 4 hours PRN Moderate Pain (4 - 6)      Allergies    No Known Allergies    Intolerances        SOCIAL HISTORY:  no active tobacco     FAMILY HISTORY:  FH: bladder cancer (Sibling)        Vital Signs Last 24 Hrs  T(C): 37.3 (24 Jan 2023 08:00), Max: 37.3 (24 Jan 2023 08:00)  T(F): 99.2 (24 Jan 2023 08:00), Max: 99.2 (24 Jan 2023 08:00)  HR: 90 (24 Jan 2023 08:00) (86 - 100)  BP: 154/75 (24 Jan 2023 08:00) (128/80 - 166/83)  BP(mean): 96 (23 Jan 2023 14:18) (96 - 96)  RR: 18 (24 Jan 2023 08:00) (15 - 18)  SpO2: 97% (24 Jan 2023 08:00) (97% - 100%)    Parameters below as of 24 Jan 2023 08:00  Patient On (Oxygen Delivery Method): room air        PHYSICAL EXAM:      General:well developed thin man in bed, in no acute distress  Eyes:sclera anicteric, pupils equal and EOMI  ENMT:buccal mucosa moist  Neck:supple, trachea midline  Lungs:clear, no wheeze/rhonchi  Cardiovascular:regular rate and rhythm, S1 S2  Abdomen:soft, nontender, no organomegaly present, bowel sounds normal  Neurological:alert and oriented x3, Cranial Nerves II-XII grossly intact  Skin:no increased ecchymosis/petechiae/purpura  Lymph Nodes:no palpable cervical/supraclavicular lymph nodes enlargements  Extremities:no cyanosis/clubbing/edema        LABS:                        8.6    7.89  )-----------( 163      ( 24 Jan 2023 08:21 )             26.2     01-24 @ 08:21  WBC7.89  RBC3.03 Hgb8.6 Hct26.2  MCV86.5  [id number]  Auto Dcjkcd89.6 Band-- Auto Lymph13.9 Atypical Lymph-- Reactive Lymph-- Auto Mono9.1 Auto Eos3.2 Auto Baso0.8        01-23 @ 17:40  WBC10.30  RBC3.73 Hgb10.4 Hct31.8  MCV85.3  [id number]  Auto Amgbqm55.8 Band-- Auto Lymph9.7 Atypical Lymph-- Reactive Lymph-- Auto Mono6.6 Auto Eos1.0 Auto Baso0.4          01-24    140  |  108  |  19  ----------------------------<  96  4.0   |  26  |  1.55<H>    Ca    8.8      24 Jan 2023 08:21    TPro  5.8<L>  /  Alb  3.0<L>  /  TBili  0.7  /  DBili  x   /  AST  15  /  ALT  35  /  AlkPhos  66  01-24 01-23 @ 17:40  PT13.5 INR1.17  PTT29.1        PERIPHERAL BLOOD SMEAR REVIEW:      RADIOLOGY & ADDITIONAL STUDIES:  < from: Xray Femur 2 Views, Left (01.23.23 @ 17:00) >    ACC: [id number] EXAM:  XR FEMUR 2 VIEWS LT   ORDERED BY: ABDULLAHI MORRIS     PROCEDURE DATE:  01/23/2023          INTERPRETATION:  Frontal and lateral views of the left femur were   obtained for history of pain following fall.    There are no prior studies for comparison.    There is a nondisplaced fracture through the intertrochanteric line of   the proximal left femur along with a nondisplaced fracture through the   lesser trochanter. There is no dislocation. This is seen on a background   of moderately severe degenerative osteoarthritis of the left hip,   including joint narrowing, sclerosis and cystic changes along with bone   spur formation. There is a 2.5 cm subcortical cyst within the left femur   head. The remainder the left femur isnotable only for degenerative   changes of the left knee with no other fracture seen and there is no   dislocation. Please note however that portions of the left knee are out   of the field-of-view.    IMPRESSION:  1. Nondisplaced intertrochanteric fracture of the proximal left femur   with avulsion of the lesser trochanter but without displacement.  2. Moderately severe degenerative osteoarthritis of the left hip.  3. No dislocation.    < end of copied text >  
  Patient is a 77y old  Male who presents with a chief complaint of s/p fall left hip pain (23 Jan 2023 19:53)      Reason For Consult: primary hyperparathyroidism    HPI:  77M with hx of prostate cancer (no tx), hyperthyroidism, CKD, HTN, HLD, r hypercalcemia (found to have parathyroid nodule),  hyperparathyroidism.planned for parathyroidectomy, and was getting preop labs tday, .c/o left thigh pain, mild head pain s/p trip and fall. As he was leaving pre surgical testing he bumped into a scale and fell. No LOC. No blood thinners.Difficulty walking afterwards due to pain.   In ED afebrile,  /82, calium levels normal, ortho consult called and is being admitted (23 Jan 2023 19:53)      PAST MEDICAL & SURGICAL HISTORY:  Hypertension      Hyperlipidemia      HTN (hypertension)      Hyperlipidemia      History of hyperthyroidism      OCD (obsessive compulsive disorder)      Prostate cancer      Chronic kidney disease, unspecified CKD stage      Hyperparathyroidism      H/O colonoscopy      H/O endoscopy          FAMILY HISTORY:  FH: bladder cancer (Sibling)          Social History:    MEDICATIONS  (STANDING):  amLODIPine   Tablet 10 milliGRAM(s) Oral daily  atorvastatin 10 milliGRAM(s) Oral at bedtime  cinacalcet 30 milliGRAM(s) Oral two times a day  methimazole 5 milliGRAM(s) Oral daily  pantoprazole    Tablet 40 milliGRAM(s) Oral before breakfast  PARoxetine 20 milliGRAM(s) Oral daily  sodium chloride 0.9%. 1000 milliLiter(s) (100 mL/Hr) IV Continuous <Continuous>  sucralfate suspension 1 Gram(s) Oral two times a day  tamsulosin 0.8 milliGRAM(s) Oral at bedtime    MEDICATIONS  (PRN):  acetaminophen     Tablet .. 650 milliGRAM(s) Oral every 6 hours PRN Temp greater or equal to 38C (100.4F), Mild Pain (1 - 3)  morphine  - Injectable 2 milliGRAM(s) IV Push every 4 hours PRN Severe Pain (7 - 10)  oxyCODONE    IR 5 milliGRAM(s) Oral every 4 hours PRN Moderate Pain (4 - 6)        T(C): 37.3 (01-24-23 @ 08:00), Max: 37.3 (01-24-23 @ 08:00)  HR: 90 (01-24-23 @ 08:00) (86 - 100)  BP: 154/75 (01-24-23 @ 08:00) (128/80 - 166/83)  RR: 18 (01-24-23 @ 08:00) (15 - 18)  SpO2: 97% (01-24-23 @ 08:00) (97% - 100%)  Wt(kg): --    PHYSICAL EXAM:  CHEST/LUNG: Clear to percussion bilaterally; No rales, rhonchi, wheezing, or rubs  HEART: Regular rate and rhythm; No murmurs, rubs, or gallops  ABDOMEN: Soft, Nontender, Nondistended; Bowel sounds present  EXTREMITIES:  2+ Peripheral Pulses, No clubbing, cyanosis, or edema  SKIN: No rashes or lesions    CAPILLARY BLOOD GLUCOSE                                8.6    7.89  )-----------( 163      ( 24 Jan 2023 08:21 )             26.2       CMP:  01-24 @ 08:21  SGPT 35  Albumin 3.0   Alk Phos 66   Anion Gap 6   SGOT 15   Total Bili 0.7   BUN 19   Calcium Total 8.8   CO2 26   Chloride 108   Creatinine 1.55   eGFR if AA --   eGFR if non AA --   Glucose 96   Potassium 4.0   Protein 5.8   Sodium 140      Thyroid Function Tests:      Diabetes Tests:       Radiology:

## 2023-01-28 LAB
ALBUMIN SERPL ELPH-MCNC: 2.7 G/DL — LOW (ref 3.3–5)
ALP SERPL-CCNC: 87 U/L — SIGNIFICANT CHANGE UP (ref 30–120)
ALT FLD-CCNC: 34 U/L DA — SIGNIFICANT CHANGE UP (ref 10–60)
ANION GAP SERPL CALC-SCNC: 8 MMOL/L — SIGNIFICANT CHANGE UP (ref 5–17)
AST SERPL-CCNC: 41 U/L — HIGH (ref 10–40)
BILIRUB SERPL-MCNC: 0.8 MG/DL — SIGNIFICANT CHANGE UP (ref 0.2–1.2)
BUN SERPL-MCNC: 23 MG/DL — SIGNIFICANT CHANGE UP (ref 7–23)
CALCIUM SERPL-MCNC: 8.8 MG/DL — SIGNIFICANT CHANGE UP (ref 8.4–10.5)
CHLORIDE SERPL-SCNC: 104 MMOL/L — SIGNIFICANT CHANGE UP (ref 96–108)
CO2 SERPL-SCNC: 26 MMOL/L — SIGNIFICANT CHANGE UP (ref 22–31)
CREAT SERPL-MCNC: 1.5 MG/DL — HIGH (ref 0.5–1.3)
EGFR: 48 ML/MIN/1.73M2 — LOW
GLUCOSE SERPL-MCNC: 93 MG/DL — SIGNIFICANT CHANGE UP (ref 70–99)
HCT VFR BLD CALC: 27.9 % — LOW (ref 39–50)
HGB BLD-MCNC: 9.3 G/DL — LOW (ref 13–17)
MCHC RBC-ENTMCNC: 28.4 PG — SIGNIFICANT CHANGE UP (ref 27–34)
MCHC RBC-ENTMCNC: 33.3 GM/DL — SIGNIFICANT CHANGE UP (ref 32–36)
MCV RBC AUTO: 85.1 FL — SIGNIFICANT CHANGE UP (ref 80–100)
NRBC # BLD: 0 /100 WBCS — SIGNIFICANT CHANGE UP (ref 0–0)
PLATELET # BLD AUTO: 176 K/UL — SIGNIFICANT CHANGE UP (ref 150–400)
POTASSIUM SERPL-MCNC: 3.7 MMOL/L — SIGNIFICANT CHANGE UP (ref 3.5–5.3)
POTASSIUM SERPL-SCNC: 3.7 MMOL/L — SIGNIFICANT CHANGE UP (ref 3.5–5.3)
PROT SERPL-MCNC: 5.3 G/DL — LOW (ref 6–8.3)
RBC # BLD: 3.28 M/UL — LOW (ref 4.2–5.8)
RBC # FLD: 15.1 % — HIGH (ref 10.3–14.5)
SODIUM SERPL-SCNC: 138 MMOL/L — SIGNIFICANT CHANGE UP (ref 135–145)
WBC # BLD: 7.12 K/UL — SIGNIFICANT CHANGE UP (ref 3.8–10.5)
WBC # FLD AUTO: 7.12 K/UL — SIGNIFICANT CHANGE UP (ref 3.8–10.5)

## 2023-01-28 RX ORDER — METOPROLOL TARTRATE 50 MG
25 TABLET ORAL DAILY
Refills: 0 | Status: DISCONTINUED | OUTPATIENT
Start: 2023-01-28 | End: 2023-01-30

## 2023-01-28 RX ORDER — ASPIRIN/CALCIUM CARB/MAGNESIUM 324 MG
1 TABLET ORAL
Qty: 0 | Refills: 0 | DISCHARGE

## 2023-01-28 RX ADMIN — Medication 20 MILLIGRAM(S): at 12:02

## 2023-01-28 RX ADMIN — Medication 1000 MILLIGRAM(S): at 05:24

## 2023-01-28 RX ADMIN — Medication 1000 MILLIGRAM(S): at 15:17

## 2023-01-28 RX ADMIN — Medication 1000 MILLIGRAM(S): at 22:07

## 2023-01-28 RX ADMIN — OXYCODONE HYDROCHLORIDE 5 MILLIGRAM(S): 5 TABLET ORAL at 11:15

## 2023-01-28 RX ADMIN — OXYCODONE HYDROCHLORIDE 5 MILLIGRAM(S): 5 TABLET ORAL at 10:15

## 2023-01-28 RX ADMIN — AMLODIPINE BESYLATE 10 MILLIGRAM(S): 2.5 TABLET ORAL at 05:24

## 2023-01-28 RX ADMIN — Medication 25 MILLIGRAM(S): at 12:02

## 2023-01-28 RX ADMIN — Medication 30 MILLIGRAM(S): at 12:02

## 2023-01-28 RX ADMIN — ATORVASTATIN CALCIUM 10 MILLIGRAM(S): 80 TABLET, FILM COATED ORAL at 22:07

## 2023-01-28 RX ADMIN — ENOXAPARIN SODIUM 40 MILLIGRAM(S): 100 INJECTION SUBCUTANEOUS at 09:07

## 2023-01-28 RX ADMIN — CINACALCET 30 MILLIGRAM(S): 30 TABLET, FILM COATED ORAL at 12:05

## 2023-01-28 RX ADMIN — Medication 1000 MILLIGRAM(S): at 22:09

## 2023-01-28 RX ADMIN — Medication 1000 MILLIGRAM(S): at 15:19

## 2023-01-28 RX ADMIN — PANTOPRAZOLE SODIUM 40 MILLIGRAM(S): 20 TABLET, DELAYED RELEASE ORAL at 05:26

## 2023-01-28 RX ADMIN — Medication 1000 MILLIGRAM(S): at 05:27

## 2023-01-28 RX ADMIN — TAMSULOSIN HYDROCHLORIDE 0.8 MILLIGRAM(S): 0.4 CAPSULE ORAL at 22:06

## 2023-01-28 NOTE — CASE MANAGEMENT PROGRESS NOTE - NSCMPROGRESSNOTE_GEN_ALL_CORE
Pt not cleared medically for home today as the pt was started on Lopressor for his increase in HR. [facility] was notified to change the SOC to Monday 1/30/23 in anticipation of a Sunday transition home. This CM dispensed the pt his RW that was approved, and got the consignment form signed. The commode and shower chair were not approved by the pt's insurance. The pt's daughter will purchase a RTS today and has already purchased a shower chair. CM team to follow tomorrow. Bedside RN is aware of the plan for discharge tomorrow.

## 2023-01-28 NOTE — PROGRESS NOTE ADULT - SUBJECTIVE AND OBJECTIVE BOX
Procedure: ORIF  Left IT Hip Fx with short Gamma Nail  POD#:     S: Pt without complaints. No SOB,CP, N/V. Tolerated Diet well.   Pain comfortable (3/10 ) on  Interval Rx. [+BM], + flatus, No abdominal pain.  .3Had 1unit PRBC transfusion yest. for H&H 7.3/21.7.  Pain Rx:  acetaminophen     Tablet .. 1000 milliGRAM(s) Oral every 8 hours  acetaminophen   IVPB .. 1000 milliGRAM(s) IV Intermittent once  HYDROmorphone  Injectable 0.25 milliGRAM(s) IV Push every 10 minutes PRN  HYDROmorphone  Injectable 0.5 milliGRAM(s) IV Push every 10 minutes PRN  ondansetron Injectable 4 milliGRAM(s) IV Push every 6 hours PRN  ondansetron Injectable 4 milliGRAM(s) IV Push once PRN  oxyCODONE    IR 5 milliGRAM(s) Oral every 3 hours PRN  oxyCODONE    IR 10 milliGRAM(s) Oral every 3 hours PRN  PARoxetine 20 milliGRAM(s) Oral daily  PARoxetine 30 milliGRAM(s) Oral daily    O: General: Pt Alert and oriented, On exam NAD,   VS: Vital Signs Last 24 Hrs  T(C): 37.3 (27 Jan 2023 23:36), Max: 37.3 (27 Jan 2023 23:36)  T(F): 99.2 (27 Jan 2023 23:36), Max: 99.2 (27 Jan 2023 23:36)  HR: 109 (28 Jan 2023 05:22) (94 - 120)  BP: 153/90 (28 Jan 2023 05:22) (136/79 - 162/85)  RR: 17 (28 Jan 2023 05:22) (16 - 18)  SpO2: 97% (28 Jan 2023 05:22) (93% - 99%)    Lungs: BS clear bilat.  [Abdomen]: Soft; no distention, benign exam  Ext(/Left  Hip/thigh): [place]  Dressing; Lateral Hip/thigh clean, dry, & intact, ; Upper [place] chsanged;  staples intact; no  dehiscence; Min.. STS; No  cellultitis;    Neurologic: Has sensation bilat. feet & toes ;  Full AROM bilat feet & toes. EHL / AT  = Bilat: 5/5   Vascular: Feet toes warm, pink. DP = 2+. Calves soft ; w/o tenderness bilat..  VTEP: On Bilat. Venodynes + enoxaparin Injectable 40 milliGRAM(s) SubCutaneous every 24 hours     Activity in PT yesterday Noted.[Walked 50 ft. with walker]. [Sat up for 1 hours].    Labs Today:  CBC:                      9.3    7.12  )-----------( 176      ( 28 Jan 2023 06:46 )             27.9     01-28  Chem:  138  |  104  |  23  ----------------------------<  93  3.7   |  26  |  1.50<H>    Ca    8.8      28 Jan 2023 06:46    TPro  5.3<L>  /  Alb  2.7<L>  /  TBili  0.8  /  DBili  x   /  AST  41<H>  /  ALT  34  /  AlkPhos  87  01-28      Hospitalist input yest. noted.    Seen by Cardiology this AM. No intervention needed for HR    Primary Orthopedic Assessment:  • Stable from Orthopedic perspective  • Neuro motor exam stable  • Labs: CBC with improved H&H from yesterday      Plan:   • Continue:  PT/OT/Weightbearing as tolerated with assistance of a walker/Ice to hip/       • Continue DVT prophylaxis as prescribed, including use of compression devices and ankle pumps; to go home with Ecotrin 81 Q12 per Surgeon  • Continue Pain Rx  • Plans per Medicine Cardiology  • Discharge planning – anticipated discharge is:  Home D/C with home care & home PT  when medically stable & cleared by PT/OT

## 2023-01-28 NOTE — PROGRESS NOTE ADULT - SUBJECTIVE AND OBJECTIVE BOX
Chief Complaint: Fall    Interval Events: No events overnight. Received transfusion in the evening.    Review of Systems:  General: No fevers, chills, weight gain  Skin: No rashes, color changes  Cardiovascular: No chest pain, orthopnea  Respiratory: No shortness of breath, cough  Gastrointestinal: No nausea, abdominal pain  Genitourinary: No incontinence, pain with urination  Musculoskeletal: No pain, swelling, decreased range of motion  Neurological: No headache, weakness  Psychiatric: No depression, anxiety  Endocrine: No weight gain, increased thirst  All other systems are comprehensively negative.    Physical Exam:  Vital Signs Last 24 Hrs  T(C): 36.9 (28 Jan 2023 08:23), Max: 37.3 (27 Jan 2023 23:36)  T(F): 98.4 (28 Jan 2023 08:23), Max: 99.2 (27 Jan 2023 23:36)  HR: 116 (28 Jan 2023 08:23) (108 - 120)  BP: 154/89 (28 Jan 2023 08:23) (136/79 - 162/85)  BP(mean): --  RR: 20 (28 Jan 2023 08:23) (17 - 20)  SpO2: 95% (28 Jan 2023 08:23) (93% - 99%)  Parameters below as of 28 Jan 2023 05:22  Patient On (Oxygen Delivery Method): room air  General: NAD  HEENT: MMM  Neck: No JVD, no carotid bruit  Lungs: CTAB  CV: RRR, nl S1/S2, no M/R/G  Abdomen: S/NT/ND, +BS  Extremities: No LE edema, no cyanosis  Neuro: AAOx3, non-focal  Skin: No rash    Labs:             01-27    140  |  106  |  26<H>  ----------------------------<  92  3.9   |  27  |  1.59<H>    Ca    8.4      27 Jan 2023 06:00    TPro  5.5<L>  /  Alb  2.7<L>  /  TBili  2.3<H>  /  DBili  x   /  AST  16  /  ALT  12  /  AlkPhos  58  01-26                        7.9    8.84  )-----------( 161      ( 27 Jan 2023 09:18 )             24.3       Chief Complaint: Fall    Interval Events: No events overnight. Received transfusion in the evening.    Review of Systems:  General: No fevers, chills, weight gain  Skin: No rashes, color changes  Cardiovascular: No chest pain, orthopnea  Respiratory: No shortness of breath, cough  Gastrointestinal: No nausea, abdominal pain  Genitourinary: No incontinence, pain with urination  Musculoskeletal: No pain, swelling, decreased range of motion  Neurological: No headache, weakness  Psychiatric: No depression, anxiety  Endocrine: No weight gain, increased thirst  All other systems are comprehensively negative.    Physical Exam:  Vital Signs Last 24 Hrs  T(C): 36.9 (28 Jan 2023 08:23), Max: 37.3 (27 Jan 2023 23:36)  T(F): 98.4 (28 Jan 2023 08:23), Max: 99.2 (27 Jan 2023 23:36)  HR: 116 (28 Jan 2023 08:23) (108 - 120)  BP: 154/89 (28 Jan 2023 08:23) (136/79 - 162/85)  BP(mean): --  RR: 20 (28 Jan 2023 08:23) (17 - 20)  SpO2: 95% (28 Jan 2023 08:23) (93% - 99%)  Parameters below as of 28 Jan 2023 05:22  Patient On (Oxygen Delivery Method): room air  General: NAD  HEENT: MMM  Neck: No JVD, no carotid bruit  Lungs: CTAB  CV: RRR, nl S1/S2, no M/R/G  Abdomen: S/NT/ND, +BS  Extremities: No LE edema, no cyanosis  Neuro: AAOx3, non-focal  Skin: No rash    Labs:             01-27    140  |  106  |  26<H>  ----------------------------<  92  3.9   |  27  |  1.59<H>    Ca    8.4      27 Jan 2023 06:00    TPro  5.5<L>  /  Alb  2.7<L>  /  TBili  2.3<H>  /  DBili  x   /  AST  16  /  ALT  12  /  AlkPhos  58  01-26                        7.9    8.84  )-----------( 161      ( 27 Jan 2023 09:18 )             24.3      Telemetry: Sinus tachycardia

## 2023-01-28 NOTE — PROGRESS NOTE ADULT - SUBJECTIVE AND OBJECTIVE BOX
Patient is a 77y old  Male who presents with a chief complaint of ORIF LEft Hip IT FX 1/24 (28 Jan 2023 08:02)      INTERVAL HPI/OVERNIGHT EVENTS:    Home Medications:  acetaminophen 500 mg oral tablet: 2 tab(s) orally every 8 hours (25 Jan 2023 10:27)  Carafate 1 g/10 mL oral suspension: 10 milliliter(s) orally 2 times a day (24 Jan 2023 17:56)  Ecotrin Adult Low Strength 81 mg oral delayed release tablet: 1 tab(s) orally every 12 hours x 4 weeks (28 Jan 2023 08:17)  Flonase 50 mcg/inh nasal spray: 2 spray(s) in each nostril once a day (24 Jan 2023 17:56)  methIMAzole 5 mg oral tablet: 0.5 tab(s) orally once a day (24 Jan 2023 17:56)  Multiple Vitamins oral tablet: 1 tab(s) orally once a day (25 Jan 2023 10:27)  omeprazole 20 mg oral delayed release capsule: 1 cap(s) orally once a day (24 Jan 2023 17:56)  PARoxetine 20 mg oral tablet: 1 tab(s) orally once a day  ***take with 30mg for TDD = 50mg*** (24 Jan 2023 17:56)  PARoxetine 30 mg oral tablet: 1 tab(s) orally once a day  ***take with 20mg for TDD = 50mg*** (24 Jan 2023 17:56)  polyethylene glycol 3350 oral powder for reconstitution: 17 gram(s) orally once a day (at bedtime) (25 Jan 2023 10:27)  pravastatin 20 mg oral tablet: 1 tab(s) orally once a day (at bedtime) (24 Jan 2023 17:56)  senna leaf extract oral tablet: 2 tab(s) orally once a day (at bedtime) (25 Jan 2023 10:27)  terazosin 5 mg oral capsule: 1 cap(s) orally once a day (at bedtime) (24 Jan 2023 17:56)      MEDICATIONS  (STANDING):  acetaminophen     Tablet .. 1000 milliGRAM(s) Oral every 8 hours  acetaminophen   IVPB .. 1000 milliGRAM(s) IV Intermittent once  amLODIPine   Tablet 10 milliGRAM(s) Oral daily  atorvastatin 10 milliGRAM(s) Oral at bedtime  ceFAZolin   IVPB 2000 milliGRAM(s) IV Intermittent once  chlorhexidine 2% Cloths 1 Application(s) Topical once  cinacalcet 30 milliGRAM(s) Oral daily  enoxaparin Injectable 40 milliGRAM(s) SubCutaneous every 24 hours  methimazole 2.5 milliGRAM(s) Oral daily  metoprolol succinate ER 25 milliGRAM(s) Oral daily  multivitamin 1 Tablet(s) Oral daily  pantoprazole    Tablet 40 milliGRAM(s) Oral before breakfast  PARoxetine 20 milliGRAM(s) Oral daily  PARoxetine 30 milliGRAM(s) Oral daily  polyethylene glycol 3350 17 Gram(s) Oral at bedtime  senna 2 Tablet(s) Oral at bedtime  sucralfate suspension 1 Gram(s) Oral two times a day  tamsulosin 0.8 milliGRAM(s) Oral at bedtime    MEDICATIONS  (PRN):  HYDROmorphone  Injectable 0.25 milliGRAM(s) IV Push every 10 minutes PRN Moderate Pain (4 - 6)  HYDROmorphone  Injectable 0.5 milliGRAM(s) IV Push every 10 minutes PRN Severe Pain (7 - 10)  magnesium hydroxide Suspension 30 milliLiter(s) Oral daily PRN Constipation  ondansetron Injectable 4 milliGRAM(s) IV Push every 6 hours PRN Nausea and/or Vomiting  ondansetron Injectable 4 milliGRAM(s) IV Push once PRN Nausea and/or Vomiting  oxyCODONE    IR 5 milliGRAM(s) Oral every 3 hours PRN Moderate Pain (4 - 6)  oxyCODONE    IR 10 milliGRAM(s) Oral every 3 hours PRN Severe Pain (7 - 10)      Allergies    ChlorproMAZINE Hydrochloride (Unknown)    Intolerances        REVIEW OF SYSTEMS:  CONSTITUTIONAL: No fever, weight loss, or fatigue  EYES: No eye pain, visual disturbances, or discharge  ENMT:  No difficulty hearing, tinnitus, vertigo; No sinus or throat pain  NECK: No pain or stiffness  BREASTS: No pain, masses, or nipple discharge  RESPIRATORY: No cough, wheezing, chills or hemoptysis; No shortness of breath  CARDIOVASCULAR: No chest pain, palpitations, dizziness, or leg swelling  GASTROINTESTINAL: No abdominal or epigastric pain. No nausea, vomiting, or hematemesis; No diarrhea or constipation. No melena or hematochezia.  GENITOURINARY: No dysuria, frequency, hematuria, or incontinence  NEUROLOGICAL: No headaches, memory loss, loss of strength, numbness, or tremors  SKIN: No itching, burning, rashes, or lesions   LYMPH NODES: No enlarged glands  ENDOCRINE: No heat or cold intolerance; No hair loss  MUSCULOSKELETAL: No joint pain or swelling; No muscle, back, or extremity pain  PSYCHIATRIC: No depression, anxiety, mood swings, or difficulty sleeping  HEME/LYMPH: No easy bruising, or bleeding gums  ALLERGY AND IMMUNOLOGIC: No hives or eczema    Vital Signs Last 24 Hrs  T(C): 36.9 (28 Jan 2023 08:23), Max: 37.3 (27 Jan 2023 23:36)  T(F): 98.4 (28 Jan 2023 08:23), Max: 99.2 (27 Jan 2023 23:36)  HR: 121 (28 Jan 2023 12:00) (108 - 121)  BP: 121/75 (28 Jan 2023 12:00) (121/75 - 162/85)  BP(mean): --  RR: 20 (28 Jan 2023 08:23) (17 - 20)  SpO2: 95% (28 Jan 2023 08:23) (93% - 99%)    Parameters below as of 28 Jan 2023 05:22  Patient On (Oxygen Delivery Method): room air        PHYSICAL EXAM:  GENERAL: NAD, well-groomed, well-developed  HEAD:  Atraumatic, Normocephalic  EYES: EOMI, PERRLA, conjunctiva and sclera clear  ENMT: Moist mucous membranes,   NECK: Supple, No JVD, Normal thyroid  NERVOUS SYSTEM:  Alert & Oriented X3, non focal  CHEST/LUNG: Clear to percussion bilaterally; No rales, rhonchi, wheezing, or rubs  HEART: sinus tachy  ABDOMEN: Soft, Nontender, Nondistended; Bowel sounds present  EXTREMITIES:  2+ Peripheral Pulses, No clubbing, cyanosis, or edema  LYMPH: No lymphadenopathy noted  SKIN: No rashes or lesions    LABS:                        9.3    7.12  )-----------( 176      ( 28 Jan 2023 06:46 )             27.9     01-28    138  |  104  |  23  ----------------------------<  93  3.7   |  26  |  1.50<H>    Ca    8.8      28 Jan 2023 06:46    TPro  5.3<L>  /  Alb  2.7<L>  /  TBili  0.8  /  DBili  x   /  AST  41<H>  /  ALT  34  /  AlkPhos  87  01-28        CAPILLARY BLOOD GLUCOSE              I&O's Summary    27 Jan 2023 07:01  -  28 Jan 2023 07:00  --------------------------------------------------------  IN: 0 mL / OUT: 300 mL / NET: -300 mL        RADIOLOGY & ADDITIONAL TESTS:    Imaging Personally Reviewed:  [ x] YES  [ ] NO    Consultant(s) Notes Reviewed:  [ x] YES  [ ] NO    Care Discussed with Consultants/Other Providers [ x] YES  [ ] NO

## 2023-01-29 LAB
ALBUMIN SERPL ELPH-MCNC: 2.6 G/DL — LOW (ref 3.3–5)
ALP SERPL-CCNC: 88 U/L — SIGNIFICANT CHANGE UP (ref 30–120)
ALT FLD-CCNC: 50 U/L DA — SIGNIFICANT CHANGE UP (ref 10–60)
ANION GAP SERPL CALC-SCNC: 6 MMOL/L — SIGNIFICANT CHANGE UP (ref 5–17)
AST SERPL-CCNC: 34 U/L — SIGNIFICANT CHANGE UP (ref 10–40)
BILIRUB SERPL-MCNC: 0.6 MG/DL — SIGNIFICANT CHANGE UP (ref 0.2–1.2)
BUN SERPL-MCNC: 25 MG/DL — HIGH (ref 7–23)
CALCIUM SERPL-MCNC: 8.7 MG/DL — SIGNIFICANT CHANGE UP (ref 8.4–10.5)
CHLORIDE SERPL-SCNC: 101 MMOL/L — SIGNIFICANT CHANGE UP (ref 96–108)
CO2 SERPL-SCNC: 26 MMOL/L — SIGNIFICANT CHANGE UP (ref 22–31)
CREAT SERPL-MCNC: 1.57 MG/DL — HIGH (ref 0.5–1.3)
EGFR: 46 ML/MIN/1.73M2 — LOW
GLUCOSE SERPL-MCNC: 105 MG/DL — HIGH (ref 70–99)
HCT VFR BLD CALC: 28.5 % — LOW (ref 39–50)
HGB BLD-MCNC: 9.7 G/DL — LOW (ref 13–17)
MCHC RBC-ENTMCNC: 28.9 PG — SIGNIFICANT CHANGE UP (ref 27–34)
MCHC RBC-ENTMCNC: 34 GM/DL — SIGNIFICANT CHANGE UP (ref 32–36)
MCV RBC AUTO: 84.8 FL — SIGNIFICANT CHANGE UP (ref 80–100)
NRBC # BLD: 0 /100 WBCS — SIGNIFICANT CHANGE UP (ref 0–0)
PLATELET # BLD AUTO: 190 K/UL — SIGNIFICANT CHANGE UP (ref 150–400)
POTASSIUM SERPL-MCNC: 3.5 MMOL/L — SIGNIFICANT CHANGE UP (ref 3.5–5.3)
POTASSIUM SERPL-SCNC: 3.5 MMOL/L — SIGNIFICANT CHANGE UP (ref 3.5–5.3)
PROT SERPL-MCNC: 5.8 G/DL — LOW (ref 6–8.3)
RBC # BLD: 3.36 M/UL — LOW (ref 4.2–5.8)
RBC # FLD: 14.7 % — HIGH (ref 10.3–14.5)
SODIUM SERPL-SCNC: 133 MMOL/L — LOW (ref 135–145)
WBC # BLD: 7.42 K/UL — SIGNIFICANT CHANGE UP (ref 3.8–10.5)
WBC # FLD AUTO: 7.42 K/UL — SIGNIFICANT CHANGE UP (ref 3.8–10.5)

## 2023-01-29 RX ORDER — METOPROLOL TARTRATE 50 MG
1 TABLET ORAL
Qty: 30 | Refills: 0
Start: 2023-01-29 | End: 2023-02-27

## 2023-01-29 RX ORDER — ASPIRIN/CALCIUM CARB/MAGNESIUM 324 MG
81 TABLET ORAL EVERY 12 HOURS
Refills: 0 | Status: DISCONTINUED | OUTPATIENT
Start: 2023-01-29 | End: 2023-01-30

## 2023-01-29 RX ADMIN — Medication 1000 MILLIGRAM(S): at 05:19

## 2023-01-29 RX ADMIN — TAMSULOSIN HYDROCHLORIDE 0.8 MILLIGRAM(S): 0.4 CAPSULE ORAL at 21:14

## 2023-01-29 RX ADMIN — Medication 30 MILLILITER(S): at 18:53

## 2023-01-29 RX ADMIN — Medication 1000 MILLIGRAM(S): at 14:25

## 2023-01-29 RX ADMIN — Medication 25 MILLIGRAM(S): at 05:18

## 2023-01-29 RX ADMIN — Medication 1000 MILLIGRAM(S): at 05:22

## 2023-01-29 RX ADMIN — Medication 1000 MILLIGRAM(S): at 14:27

## 2023-01-29 RX ADMIN — Medication 30 MILLIGRAM(S): at 12:02

## 2023-01-29 RX ADMIN — Medication 20 MILLIGRAM(S): at 12:02

## 2023-01-29 RX ADMIN — Medication 81 MILLIGRAM(S): at 10:03

## 2023-01-29 RX ADMIN — Medication 30 MILLILITER(S): at 10:05

## 2023-01-29 RX ADMIN — AMLODIPINE BESYLATE 10 MILLIGRAM(S): 2.5 TABLET ORAL at 05:19

## 2023-01-29 RX ADMIN — Medication 81 MILLIGRAM(S): at 21:14

## 2023-01-29 RX ADMIN — PANTOPRAZOLE SODIUM 40 MILLIGRAM(S): 20 TABLET, DELAYED RELEASE ORAL at 05:19

## 2023-01-29 RX ADMIN — CINACALCET 30 MILLIGRAM(S): 30 TABLET, FILM COATED ORAL at 12:02

## 2023-01-29 RX ADMIN — ATORVASTATIN CALCIUM 10 MILLIGRAM(S): 80 TABLET, FILM COATED ORAL at 21:15

## 2023-01-29 NOTE — PROVIDER CONTACT NOTE (OTHER) - BACKGROUND
Post fall x 24 hours, left Hip fracture.
Post fall x with in 24 hours, left hip fracture.
Left hip fracture, ORIF

## 2023-01-29 NOTE — PROVIDER CONTACT NOTE (OTHER) - ASSESSMENT
Denies pain or discomfort. Temperature 98.3F.  No evidences of redness or discoloration.  Yellow drainage with no evidence of blood or odor.

## 2023-01-29 NOTE — DIETITIAN INITIAL EVALUATION ADULT - PERTINENT LABORATORY DATA
01-29    133<L>  |  101  |  25<H>  ----------------------------<  105<H>  3.5   |  26  |  1.57<H>    Ca    8.7      29 Jan 2023 06:00    TPro  5.8<L>  /  Alb  2.6<L>  /  TBili  0.6  /  DBili  x   /  AST  34  /  ALT  50  /  AlkPhos  88  01-29

## 2023-01-29 NOTE — PROVIDER CONTACT NOTE (OTHER) - SITUATION
NPO orders after midnight, no orders for daily PO medications.
Patient going for Pending pre op, no orders for fluids
Wound drainage on upper silverlon dressing

## 2023-01-29 NOTE — PROGRESS NOTE ADULT - SUBJECTIVE AND OBJECTIVE BOX
ORTHOPEDIC PA PROGRESS NOTE  GABE GODFREY      77y Male                                 SY [id number] [id number]                                                                                                                           POD #    6d    STATUS POST:       Procedure: ORIF, fracture, femur, intertrochanteric, using Gamma nail  Left               Patient seen and examined at bedside.      Current Pain Management:    acetaminophen     Tablet .. 1000 milliGRAM(s) Oral every 8 hours  acetaminophen   IVPB .. 1000 milliGRAM(s) IV Intermittent once  HYDROmorphone  Injectable 0.25 milliGRAM(s) IV Push every 10 minutes PRN  HYDROmorphone  Injectable 0.5 milliGRAM(s) IV Push every 10 minutes PRN  ondansetron Injectable 4 milliGRAM(s) IV Push once PRN  ondansetron Injectable 4 milliGRAM(s) IV Push every 6 hours PRN  oxyCODONE    IR 5 milliGRAM(s) Oral every 3 hours PRN  oxyCODONE    IR 10 milliGRAM(s) Oral every 3 hours PRN  PARoxetine 20 milliGRAM(s) Oral daily  PARoxetine 30 milliGRAM(s) Oral daily      T(F): 98.7  HR: 100  BP: 144/84  RR: 16  SpO2: 98%                         9.7    7.42  )-----------( 190      ( 29 Jan 2023 06:00 )             28.5         01-29    133<L>  |  101  |  25<H>  ----------------------------<  105<H>  3.5   |  26  |  1.57<H>    Ca    8.7      29 Jan 2023 06:00    TPro  5.8<L>  /  Alb  2.6<L>  /  TBili  0.6  /  DBili  x   /  AST  34  /  ALT  50  /  AlkPhos  88  01-29 01-28-23 @ 07:01  -  01-29-23 @ 07:00  --------------------------------------------------------  IN:  Total IN: 0 mL    OUT:    Voided (mL): 900 mL  Total OUT: 900 mL    Total NET: -900 mL        Physical Exam :    -   Dressing changed sterile. 1/4 abd w serous drainage  -   Distal Neurvascular status intact grossly.   -   Warm well perfused; capillary refill <3 seconds   -   (+)EHL/FHL   -   (+) Sensation to light touch  -   (-) Calf tenderness Bilaterally      A/P: 77y Male s/p ORIF, fracture, femur, intertrochanteric, using Gamma nail  Left       -   Ortho Stable  -   Pain control:  acetaminophen     Tablet .. 1000 milliGRAM(s) Oral every 8 hours  acetaminophen   IVPB .. 1000 milliGRAM(s) IV Intermittent once  HYDROmorphone  Injectable 0.25 milliGRAM(s) IV Push every 10 minutes PRN  HYDROmorphone  Injectable 0.5 milliGRAM(s) IV Push every 10 minutes PRN  ondansetron Injectable 4 milliGRAM(s) IV Push every 6 hours PRN  ondansetron Injectable 4 milliGRAM(s) IV Push once PRN  oxyCODONE    IR 5 milliGRAM(s) Oral every 3 hours PRN  oxyCODONE    IR 10 milliGRAM(s) Oral every 3 hours PRN  PARoxetine 20 milliGRAM(s) Oral daily  PARoxetine 30 milliGRAM(s) Oral daily    -   Medicine to follow  -   Discussed with Dr. Mead will change Lovenox to Ecotrin 2/2 Drainage DVT ppx:    PAS +  aspirin enteric coated: 81 milliGRAM(s) Oral    -   PT/OT OOB,  Weight bearing status: WBAT   -  Dispo: Home monday   -   Prescribed Medications:  amLODIPine 10 mg oral tablet: 1 tab(s) orally once a day  cinacalcet 30 mg oral tablet: 1 tab(s) orally 2 times a day  metoprolol succinate 25 mg oral tablet, extended release: 1 tab(s) orally once a day  oxyCODONE 5 mg oral tablet: 1 tab(s) orally every 4 hours, As Needed  -for severe pain MDD:6    Reference #: [id number]

## 2023-01-29 NOTE — DIETITIAN INITIAL EVALUATION ADULT - ORAL INTAKE PTA/DIET HISTORY
Reported not following any therapeutic diet at home, appetite/po intake was good. No vitamin/mineral or other nutrition supplements reported.

## 2023-01-29 NOTE — PROGRESS NOTE ADULT - SUBJECTIVE AND OBJECTIVE BOX
Chief Complaint: Fall    Interval Events: No events overnight.    Review of Systems:  General: No fevers, chills, weight gain  Skin: No rashes, color changes  Cardiovascular: No chest pain, orthopnea  Respiratory: No shortness of breath, cough  Gastrointestinal: No nausea, abdominal pain  Genitourinary: No incontinence, pain with urination  Musculoskeletal: No pain, swelling, decreased range of motion  Neurological: No headache, weakness  Psychiatric: No depression, anxiety  Endocrine: No weight gain, increased thirst  All other systems are comprehensively negative.    Physical Exam:  Vital Signs Last 24 Hrs  T(C): 37.1 (29 Jan 2023 07:40), Max: 37.1 (29 Jan 2023 07:40)  T(F): 98.7 (29 Jan 2023 07:40), Max: 98.7 (29 Jan 2023 07:40)  HR: 100 (29 Jan 2023 07:40) (100 - 121)  BP: 144/84 (29 Jan 2023 07:40) (121/75 - 149/83)  BP(mean): --  RR: 16 (29 Jan 2023 07:40) (16 - 18)  SpO2: 98% (29 Jan 2023 07:40) (96% - 98%)  Parameters below as of 29 Jan 2023 07:40  Patient On (Oxygen Delivery Method): room air  General: NAD  HEENT: MMM  Neck: No JVD, no carotid bruit  Lungs: CTAB  CV: RRR, nl S1/S2, no M/R/G  Abdomen: S/NT/ND, +BS  Extremities: No LE edema, no cyanosis  Neuro: AAOx3, non-focal  Skin: No rash    Labs:             01-29    133<L>  |  101  |  25<H>  ----------------------------<  105<H>  3.5   |  26  |  1.57<H>    Ca    8.7      29 Jan 2023 06:00    TPro  5.8<L>  /  Alb  2.6<L>  /  TBili  0.6  /  DBili  x   /  AST  34  /  ALT  50  /  AlkPhos  88  01-29                        9.7    7.42  )-----------( 190      ( 29 Jan 2023 06:00 )             28.5     Telemetry: Sinus rhythm

## 2023-01-29 NOTE — PROGRESS NOTE ADULT - SUBJECTIVE AND OBJECTIVE BOX
Patient is a 77y old  Male who presents with a chief complaint of s/p fall left hip pain (28 Jan 2023 13:40)      INTERVAL HPI/OVERNIGHT EVENTS:overnight events noted    Home Medications:  acetaminophen 500 mg oral tablet: 2 tab(s) orally every 8 hours (25 Jan 2023 10:27)  Carafate 1 g/10 mL oral suspension: 10 milliliter(s) orally 2 times a day (24 Jan 2023 17:56)  Ecotrin Adult Low Strength 81 mg oral delayed release tablet: 1 tab(s) orally every 12 hours x 4 weeks (28 Jan 2023 08:17)  Flonase 50 mcg/inh nasal spray: 2 spray(s) in each nostril once a day (24 Jan 2023 17:56)  methIMAzole 5 mg oral tablet: 0.5 tab(s) orally once a day (24 Jan 2023 17:56)  Multiple Vitamins oral tablet: 1 tab(s) orally once a day (25 Jan 2023 10:27)  omeprazole 20 mg oral delayed release capsule: 1 cap(s) orally once a day (24 Jan 2023 17:56)  PARoxetine 20 mg oral tablet: 1 tab(s) orally once a day  ***take with 30mg for TDD = 50mg*** (24 Jan 2023 17:56)  PARoxetine 30 mg oral tablet: 1 tab(s) orally once a day  ***take with 20mg for TDD = 50mg*** (24 Jan 2023 17:56)  polyethylene glycol 3350 oral powder for reconstitution: 17 gram(s) orally once a day (at bedtime) (25 Jan 2023 10:27)  pravastatin 20 mg oral tablet: 1 tab(s) orally once a day (at bedtime) (24 Jan 2023 17:56)  senna leaf extract oral tablet: 2 tab(s) orally once a day (at bedtime) (25 Jan 2023 10:27)  terazosin 5 mg oral capsule: 1 cap(s) orally once a day (at bedtime) (24 Jan 2023 17:56)      MEDICATIONS  (STANDING):  acetaminophen     Tablet .. 1000 milliGRAM(s) Oral every 8 hours  acetaminophen   IVPB .. 1000 milliGRAM(s) IV Intermittent once  amLODIPine   Tablet 10 milliGRAM(s) Oral daily  atorvastatin 10 milliGRAM(s) Oral at bedtime  ceFAZolin   IVPB 2000 milliGRAM(s) IV Intermittent once  chlorhexidine 2% Cloths 1 Application(s) Topical once  cinacalcet 30 milliGRAM(s) Oral daily  enoxaparin Injectable 40 milliGRAM(s) SubCutaneous every 24 hours  methimazole 2.5 milliGRAM(s) Oral daily  metoprolol succinate ER 25 milliGRAM(s) Oral daily  multivitamin 1 Tablet(s) Oral daily  pantoprazole    Tablet 40 milliGRAM(s) Oral before breakfast  PARoxetine 20 milliGRAM(s) Oral daily  PARoxetine 30 milliGRAM(s) Oral daily  polyethylene glycol 3350 17 Gram(s) Oral at bedtime  senna 2 Tablet(s) Oral at bedtime  sucralfate suspension 1 Gram(s) Oral two times a day  tamsulosin 0.8 milliGRAM(s) Oral at bedtime    MEDICATIONS  (PRN):  HYDROmorphone  Injectable 0.25 milliGRAM(s) IV Push every 10 minutes PRN Moderate Pain (4 - 6)  HYDROmorphone  Injectable 0.5 milliGRAM(s) IV Push every 10 minutes PRN Severe Pain (7 - 10)  magnesium hydroxide Suspension 30 milliLiter(s) Oral daily PRN Constipation  ondansetron Injectable 4 milliGRAM(s) IV Push once PRN Nausea and/or Vomiting  ondansetron Injectable 4 milliGRAM(s) IV Push every 6 hours PRN Nausea and/or Vomiting  oxyCODONE    IR 5 milliGRAM(s) Oral every 3 hours PRN Moderate Pain (4 - 6)  oxyCODONE    IR 10 milliGRAM(s) Oral every 3 hours PRN Severe Pain (7 - 10)      Allergies    ChlorproMAZINE Hydrochloride (Unknown)    Intolerances        REVIEW OF SYSTEMS:  CONSTITUTIONAL: No fever, weight loss, or fatigue  EYES: No eye pain, visual disturbances, or discharge  ENMT:  No difficulty hearing, tinnitus, vertigo; No sinus or throat pain  NECK: No pain or stiffness  BREASTS: No pain, masses, or nipple discharge  RESPIRATORY: No cough, wheezing, chills or hemoptysis; No shortness of breath  CARDIOVASCULAR: No chest pain, palpitations, dizziness, or leg swelling  GASTROINTESTINAL: No abdominal or epigastric pain. No nausea, vomiting, GENITOURINARY: No dysuria, frequency, hematuria, or incontinence  NEUROLOGICAL: No headaches, memory loss, loss of strength, numbness, or tremors  SKIN: No itching, burning, rashes, or lesions   LYMPH NODES: No enlarged glands  ENDOCRINE: No heat or cold intolerance; No hair loss  MUSCULOSKELETAL: No joint pain or swelling; No muscle, back, or extremity pain, discharge from wound  PSYCHIATRIC: No depression, anxiety, mood swings, or difficulty sleeping  HEME/LYMPH: No easy bruising, or bleeding gums  ALLERGY AND IMMUNOLOGIC: No hives or eczema    Vital Signs Last 24 Hrs  T(C): 37.1 (29 Jan 2023 07:40), Max: 37.1 (29 Jan 2023 07:40)  T(F): 98.7 (29 Jan 2023 07:40), Max: 98.7 (29 Jan 2023 07:40)  HR: 100 (29 Jan 2023 07:40) (100 - 121)  BP: 144/84 (29 Jan 2023 07:40) (121/75 - 149/83)  BP(mean): --  RR: 16 (29 Jan 2023 07:40) (16 - 18)  SpO2: 98% (29 Jan 2023 07:40) (96% - 98%)    Parameters below as of 29 Jan 2023 07:40  Patient On (Oxygen Delivery Method): room air        PHYSICAL EXAM:  GENERAL: NAD, well-groomed, well-developed  HEAD:  Atraumatic, Normocephalic  EYES: EOMI, PERRLA, conjunctiva and sclera clear  ENMT: Moist mucous membranes,   NECK: Supple, No JVD, Normal thyroid  NERVOUS SYSTEM:  Alert & Oriented X3, Good concentration; Motor Strength 5/5 B/L upper and lower extremities; DTRs 2+ intact and symmetric  CHEST/LUNG: Clear to percussion bilaterally; No rales, rhonchi, wheezing, or rubs  HEART: Regular rate and rhythm; No murmurs, rubs, or gallops  ABDOMEN: Soft, Nontender, Nondistended; Bowel sounds present  EXTREMITIES:  2+ Peripheral Pulses, No clubbing, cyanosis, or edema  LYMPH: No lymphadenopathy noted  SKIN: No rashes or lesions    LABS:                        9.7    7.42  )-----------( 190      ( 29 Jan 2023 06:00 )             28.5     01-29    133<L>  |  101  |  25<H>  ----------------------------<  105<H>  3.5   |  26  |  1.57<H>    Ca    8.7      29 Jan 2023 06:00    TPro  5.8<L>  /  Alb  2.6<L>  /  TBili  0.6  /  DBili  x   /  AST  34  /  ALT  50  /  AlkPhos  88  01-29        CAPILLARY BLOOD GLUCOSE              I&O's Summary    28 Jan 2023 07:01  -  29 Jan 2023 07:00  --------------------------------------------------------  IN: 0 mL / OUT: 900 mL / NET: -900 mL        RADIOLOGY & ADDITIONAL TESTS:    Imaging Personally Reviewed:  [x ] YES  [ ] NO    Consultant(s) Notes Reviewed:  [x ] YES  [ ] NO    Care Discussed with Consultants/Other Providers [x ] YES  [ ] NO

## 2023-01-29 NOTE — PROVIDER CONTACT NOTE (OTHER) - ACTION/TREATMENT ORDERED:
Per Provider order, Hold all PO medications until surgery.
Pending fluid order 100ml/h
Consult surgical team.

## 2023-01-29 NOTE — DIETITIAN INITIAL EVALUATION ADULT - OTHER INFO
Visited patient in room, family at bedside, s/p ORIF day 5, presents with good appetite/po intake, consuming >75% of meals. Denies n/v/d/c, last BM today 1/29, bowel regimen in place. No reported difficulty chewing or swallowing. NKFA. No reported recent weight loss, current adm weight 168#, weight appears to be stable, will continue to monitor weight trends as able.     Pertinent medications/nutrition labs reviewed; noted hyponatremia, receiving antibiotic in house; elevated BUN, Cr; ordered for multivitamin.     Educated on adequate protein/energy intake to promote wound healing, prioritize protein at each meal. Provided education on Primary hyperparathyroidism, calcium and Vitamin D intake. Left written material at bedside. Pt receptive, no nutrition related questions at this time. RD to continue to monitor nutrition status per protocol.

## 2023-01-29 NOTE — DIETITIAN INITIAL EVALUATION ADULT - REASON FOR ADMISSION
Per H&P "77M with hx of prostate cancer (no tx), hyperthyroidism, CKD, HTN, HLD, r hypercalcemia (found to have parathyroid nodule),  hyperparathyroidism.planned for parathyroidectomy, and was getting preop labs tday, .c/o left thigh pain, mild head pain s/p trip and fall. As he was leaving pre surgical testing he bumped into a scale and fell. No LOC. No blood thinners.Difficulty walking afterwards due to pain."

## 2023-01-29 NOTE — CASE MANAGEMENT PROGRESS NOTE - NSCMPROGRESSNOTE_GEN_ALL_CORE
RN/CM notified by staff and MD that pt is NOT cleared for transition home today 01/29/2023 due to noted increased drainage to L hip surgical incision site. Pt and family has been made aware. Ortho team to re-eval pt in am. CM reached out to both [facility] [phone] and [facility] [phone] and notified them of above. CM remains available and continues to follow case.

## 2023-01-29 NOTE — DIETITIAN INITIAL EVALUATION ADULT - PERTINENT MEDS FT
MEDICATIONS  (STANDING):  acetaminophen     Tablet .. 1000 milliGRAM(s) Oral every 8 hours  acetaminophen   IVPB .. 1000 milliGRAM(s) IV Intermittent once  amLODIPine   Tablet 10 milliGRAM(s) Oral daily  aspirin enteric coated 81 milliGRAM(s) Oral every 12 hours  atorvastatin 10 milliGRAM(s) Oral at bedtime  ceFAZolin   IVPB 2000 milliGRAM(s) IV Intermittent once  chlorhexidine 2% Cloths 1 Application(s) Topical once  cinacalcet 30 milliGRAM(s) Oral daily  methimazole 2.5 milliGRAM(s) Oral daily  metoprolol succinate ER 25 milliGRAM(s) Oral daily  multivitamin 1 Tablet(s) Oral daily  pantoprazole    Tablet 40 milliGRAM(s) Oral before breakfast  PARoxetine 20 milliGRAM(s) Oral daily  PARoxetine 30 milliGRAM(s) Oral daily  polyethylene glycol 3350 17 Gram(s) Oral at bedtime  senna 2 Tablet(s) Oral at bedtime  sucralfate suspension 1 Gram(s) Oral two times a day  tamsulosin 0.8 milliGRAM(s) Oral at bedtime    MEDICATIONS  (PRN):  aluminum hydroxide/magnesium hydroxide/simethicone Suspension 30 milliLiter(s) Oral every 4 hours PRN Dyspepsia  HYDROmorphone  Injectable 0.5 milliGRAM(s) IV Push every 10 minutes PRN Severe Pain (7 - 10)  HYDROmorphone  Injectable 0.25 milliGRAM(s) IV Push every 10 minutes PRN Moderate Pain (4 - 6)  magnesium hydroxide Suspension 30 milliLiter(s) Oral daily PRN Constipation  ondansetron Injectable 4 milliGRAM(s) IV Push once PRN Nausea and/or Vomiting  ondansetron Injectable 4 milliGRAM(s) IV Push every 6 hours PRN Nausea and/or Vomiting  oxyCODONE    IR 10 milliGRAM(s) Oral every 3 hours PRN Severe Pain (7 - 10)  oxyCODONE    IR 5 milliGRAM(s) Oral every 3 hours PRN Moderate Pain (4 - 6)

## 2023-01-30 ENCOUNTER — APPOINTMENT (OUTPATIENT)
Dept: SURGERY | Facility: HOSPITAL | Age: 78
End: 2023-01-30

## 2023-01-30 VITALS
RESPIRATION RATE: 16 BRPM | TEMPERATURE: 98 F | OXYGEN SATURATION: 97 % | SYSTOLIC BLOOD PRESSURE: 142 MMHG | DIASTOLIC BLOOD PRESSURE: 86 MMHG

## 2023-01-30 PROBLEM — E21.3 HYPERPARATHYROIDISM, UNSPECIFIED: Chronic | Status: ACTIVE | Noted: 2023-01-23

## 2023-01-30 LAB
ANION GAP SERPL CALC-SCNC: 9 MMOL/L — SIGNIFICANT CHANGE UP (ref 5–17)
BUN SERPL-MCNC: 24 MG/DL — HIGH (ref 7–23)
CALCIUM SERPL-MCNC: 9.1 MG/DL — SIGNIFICANT CHANGE UP (ref 8.4–10.5)
CHLORIDE SERPL-SCNC: 107 MMOL/L — SIGNIFICANT CHANGE UP (ref 96–108)
CO2 SERPL-SCNC: 24 MMOL/L — SIGNIFICANT CHANGE UP (ref 22–31)
CREAT SERPL-MCNC: 1.37 MG/DL — HIGH (ref 0.5–1.3)
EGFR: 54 ML/MIN/1.73M2 — LOW
GLUCOSE SERPL-MCNC: 94 MG/DL — SIGNIFICANT CHANGE UP (ref 70–99)
POTASSIUM SERPL-MCNC: 4.1 MMOL/L — SIGNIFICANT CHANGE UP (ref 3.5–5.3)
POTASSIUM SERPL-SCNC: 4.1 MMOL/L — SIGNIFICANT CHANGE UP (ref 3.5–5.3)
SODIUM SERPL-SCNC: 140 MMOL/L — SIGNIFICANT CHANGE UP (ref 135–145)

## 2023-01-30 PROCEDURE — 84436 ASSAY OF TOTAL THYROXINE: CPT

## 2023-01-30 PROCEDURE — 36430 TRANSFUSION BLD/BLD COMPNT: CPT

## 2023-01-30 PROCEDURE — 70450 CT HEAD/BRAIN W/O DYE: CPT | Mod: MA

## 2023-01-30 PROCEDURE — 86923 COMPATIBILITY TEST ELECTRIC: CPT

## 2023-01-30 PROCEDURE — 73552 X-RAY EXAM OF FEMUR 2/>: CPT

## 2023-01-30 PROCEDURE — 80053 COMPREHEN METABOLIC PANEL: CPT

## 2023-01-30 PROCEDURE — 93970 EXTREMITY STUDY: CPT

## 2023-01-30 PROCEDURE — C1713: CPT

## 2023-01-30 PROCEDURE — 96374 THER/PROPH/DIAG INJ IV PUSH: CPT

## 2023-01-30 PROCEDURE — 97165 OT EVAL LOW COMPLEX 30 MIN: CPT

## 2023-01-30 PROCEDURE — 97116 GAIT TRAINING THERAPY: CPT

## 2023-01-30 PROCEDURE — 85730 THROMBOPLASTIN TIME PARTIAL: CPT

## 2023-01-30 PROCEDURE — 85610 PROTHROMBIN TIME: CPT

## 2023-01-30 PROCEDURE — 85027 COMPLETE CBC AUTOMATED: CPT

## 2023-01-30 PROCEDURE — 97110 THERAPEUTIC EXERCISES: CPT

## 2023-01-30 PROCEDURE — 86901 BLOOD TYPING SEROLOGIC RH(D): CPT

## 2023-01-30 PROCEDURE — 84480 ASSAY TRIIODOTHYRONINE (T3): CPT

## 2023-01-30 PROCEDURE — 85025 COMPLETE CBC W/AUTO DIFF WBC: CPT

## 2023-01-30 PROCEDURE — 96361 HYDRATE IV INFUSION ADD-ON: CPT

## 2023-01-30 PROCEDURE — C1776: CPT

## 2023-01-30 PROCEDURE — 99285 EMERGENCY DEPT VISIT HI MDM: CPT | Mod: 25

## 2023-01-30 PROCEDURE — 71045 X-RAY EXAM CHEST 1 VIEW: CPT

## 2023-01-30 PROCEDURE — 84443 ASSAY THYROID STIM HORMONE: CPT

## 2023-01-30 PROCEDURE — 87635 SARS-COV-2 COVID-19 AMP PRB: CPT

## 2023-01-30 PROCEDURE — 97161 PT EVAL LOW COMPLEX 20 MIN: CPT

## 2023-01-30 PROCEDURE — 97535 SELF CARE MNGMENT TRAINING: CPT

## 2023-01-30 PROCEDURE — P9016: CPT

## 2023-01-30 PROCEDURE — 80048 BASIC METABOLIC PNL TOTAL CA: CPT

## 2023-01-30 PROCEDURE — C9399: CPT

## 2023-01-30 PROCEDURE — 73502 X-RAY EXAM HIP UNI 2-3 VIEWS: CPT

## 2023-01-30 PROCEDURE — 86850 RBC ANTIBODY SCREEN: CPT

## 2023-01-30 PROCEDURE — 80061 LIPID PANEL: CPT

## 2023-01-30 PROCEDURE — 86900 BLOOD TYPING SEROLOGIC ABO: CPT

## 2023-01-30 PROCEDURE — 76000 FLUOROSCOPY <1 HR PHYS/QHP: CPT

## 2023-01-30 PROCEDURE — 36415 COLL VENOUS BLD VENIPUNCTURE: CPT

## 2023-01-30 PROCEDURE — 97530 THERAPEUTIC ACTIVITIES: CPT

## 2023-01-30 PROCEDURE — 93005 ELECTROCARDIOGRAM TRACING: CPT

## 2023-01-30 RX ADMIN — Medication 81 MILLIGRAM(S): at 08:44

## 2023-01-30 RX ADMIN — Medication 1000 MILLIGRAM(S): at 06:06

## 2023-01-30 RX ADMIN — PANTOPRAZOLE SODIUM 40 MILLIGRAM(S): 20 TABLET, DELAYED RELEASE ORAL at 06:06

## 2023-01-30 RX ADMIN — Medication 1000 MILLIGRAM(S): at 07:00

## 2023-01-30 RX ADMIN — Medication 25 MILLIGRAM(S): at 06:06

## 2023-01-30 RX ADMIN — AMLODIPINE BESYLATE 10 MILLIGRAM(S): 2.5 TABLET ORAL at 06:06

## 2023-01-30 RX ADMIN — CINACALCET 30 MILLIGRAM(S): 30 TABLET, FILM COATED ORAL at 11:27

## 2023-01-30 RX ADMIN — Medication 20 MILLIGRAM(S): at 11:28

## 2023-01-30 RX ADMIN — Medication 30 MILLIGRAM(S): at 11:28

## 2023-01-30 NOTE — PROGRESS NOTE ADULT - TIME BILLING
I have discussed care plan with patient and HCP,expressed understanding of problems treatment and their effect and side effects, alternatives in detail,I have asked if they have any questions and concerns and appropriately addressed them to best of my ability  Reviewed all diagonostic tests, lab results and drug drug interactions, and medications

## 2023-01-30 NOTE — PROGRESS NOTE ADULT - PROBLEM SELECTOR PROBLEM 1
Fracture neck of femur

## 2023-01-30 NOTE — PROGRESS NOTE ADULT - REASON FOR ADMISSION
s/p fall left hip pain
s/p fall left hip pain
ORIF LEft Hip IT FX 1/24
s/p fall left hip pain

## 2023-01-30 NOTE — CASE MANAGEMENT PROGRESS NOTE - NSCMPROGRESSNOTE_GEN_ALL_CORE
Patient is cleared by the treatment team to transition to home today.  Pt has all needed DME.  As requested by the patient, transportation arranged via [employer]  arranged for 2PM  today by [employer] [phone]. Patient stated his family will be home to receive him and assist him as needed.  [facility] made aware to schedule SOC for tomorrow Tuesday 1/31/23.  CM department contact info provided to patient.

## 2023-01-30 NOTE — PROGRESS NOTE ADULT - PROBLEM SELECTOR PLAN 1
pain mangement, ortho consult, optimization for surg
s/p ORIF PT,

## 2023-01-30 NOTE — PROGRESS NOTE ADULT - PROBLEM SELECTOR PROBLEM 3
Hyperparathyroidism

## 2023-01-30 NOTE — PROGRESS NOTE ADULT - ASSESSMENT
77M with hx of prostate cancer (no tx), hyperthyroidism, CKD, HTN, HLD, r hypercalcemia (found to have parathyroid nodule),  hyperparathyroidism.planned for parathyroidectomy, and was getting preop labs tday, .c/o left thigh pain, mild head pain s/p trip and fall. As he was leaving pre surgical testing he bumped into a scale and fell. No LOC. No blood thinners.Difficulty walking afterwards due to pain.   In ED afebrile,  /82, calium levels normal, ortho consult called and is being admitted  left INtertrochanteric femur fracture with OP  CKD  HTN  HLD   hyperparathyroidism  Thyroid nodule   lacunar infarcts age indeterminate  h/o prostate ca   anemi aof ch disease with acute decrease in HB due to likely blood loss  pain mangement, ortho consult, cardio and nephro, and endo consult noted    patient is hemodyanamically stable is intermediate risk, optimized for surg   PRBC to optimize   hematology consult called   w up ordered   d/w family by the bed side
The patient is a 77 year old male with a history of HTN, HL, CKD, hyperparathyroidism, prostate cancer who presents with a fall, found to have a hip fracture.    Plan:  - ECG with LVH related abnormalities  - Continue amlodipine 10 mg daily  - Ortho follow-up  - Sinus tachycardia noted on telemetry. Predominantly in the 100-110 range. This was presumably due to anemia and anxiety. Hemoglobin improved with transfusion. Thyroid studies unremarkable. LE venous duplex negative for DVT.  - BP mildly elevated - can add metoprolol  - Discharge planning
The patient is a 77 year old male with a history of HTN, HL, CKD, hyperparathyroidism, prostate cancer who presents with a fall, found to have a hip fracture.    Plan:  - ECG with LVH related abnormalities  - Continue amlodipine 10 mg daily  - Sinus tachycardia - improved. Predominantly 90s to low 100s. Discontinue telemetry.  - Continue metoprolol succinate 25 mg daily  - Hold lisinopril until outpatient follow-up  - Ortho follow-up - increased drainage at surgical site noted
77M with hx of prostate cancer (no tx), hyperthyroidism, CKD, HTN, HLD, r hypercalcemia (found to have parathyroid nodule),  hyperparathyroidism.planned for parathyroidectomy, and was getting preop labs tday, .c/o left thigh pain, mild head pain s/p trip and fall. As he was leaving pre surgical testing he bumped into a scale and fell. No LOC. No blood thinners.Difficulty walking afterwards due to pain.   In ED afebrile,  /82, calium levels normal, ortho consult called and is being admitted  left INtertrochanteric femur fracture with OP  CKD  HTN  HLD   hyperparathyroidism  Thyroid nodule   lacunar infarcts age indeterminate  h/o prostate ca   anemi aof ch disease with acute decrease in HB due to likely blood loss  s/p ORIF left on 1/24/23   PT    HB 7.9 advised prbc    
The patient is a 77 year old male with a history of HTN, HL, CKD, hyperparathyroidism, prostate cancer who presents with a fall, found to have a hip fracture.    Plan:  - ECG with LVH related abnormalities  - CXR with clear lungs  - Continue amlodipine 10 mg daily  - Ortho follow-up  - PT
The patient is a 77 year old male with a history of HTN, HL, CKD, hyperparathyroidism, prostate cancer who presents with a fall, found to have a hip fracture.    Plan:  - ECG with LVH related abnormalities  - CXR with clear lungs  - Continue amlodipine 10 mg daily  - Ortho follow-up  - PT
The patient is a 77 year old male with a history of HTN, HL, CKD, hyperparathyroidism, prostate cancer who presents with a fall, found to have a hip fracture.    Plan:  - ECG with LVH related abnormalities  - CXR with clear lungs  - Continue amlodipine 10 mg daily  - Ortho follow-up  - PT  - Discharge planning
77M with hx of prostate cancer (no tx), hyperthyroidism, CKD, HTN, HLD, r hypercalcemia (found to have parathyroid nodule),  hyperparathyroidism.planned for parathyroidectomy, and was getting preop labs tday, .c/o left thigh pain, mild head pain s/p trip and fall. As he was leaving pre surgical testing he bumped into a scale and fell. No LOC. No blood thinners.Difficulty walking afterwards due to pain.   In ED afebrile,  /82, calium levels normal, ortho consult called and is being admitted  left INtertrochanteric femur fracture with OP  CKD  HTN  HLD   hyperparathyroidism  Thyroid nodule   lacunar infarcts age indeterminate  h/o prostate ca   anemi aof ch disease with acute decrease in HB due to likely blood loss  s/p ORIF left on 1/24/23   PT    Dc plan for am    
78 yo man w hx prostate cancer (no tx), hyperthyroidism, CKD, HTN, HLD, r hypercalcemia (found to have parathyroid nodule),  hyperparathyroidism.  Pt was at [facility] for presurgical labs for the planned parathyroidectomy when tripped over object, fell, w left hip pain.  he then called ambulance to come to [place].  W/U here sig for left intertrochanteric fx of prox femur, seen by Ortho for planned surgery.    Labs-today 1/24 wbc 7.89 Hgb 8.5 ,cv 86.5 plts 163, yesterday on day of adm Hgb 10.4, old lab in computer 11/7 hgb 9.9    INR 1.17 PT 13.5 PTT 29.1    Pt reports no surgery in past but multiple teeth extraction without bleeding complications, no family hx bleeding dyscrasia, no abnormal or incr bleed.bruise hx. 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        No other sites or hx of abnromal bleed. Coags adequate.   s/p 1U PRBC already ordered by Medicine  stable from Heme standpoint for planned Ortho surgery for fx left femur  s/p L femur gamma nail.     Pt is high risk for VTE, Caprini score 11, [+prostate cancer hx, BMI <25, no COPD/CVA/MI/family hx/personal hx]. estimated risk 10.7%    RECOMMENDATIONS  Recommend 30d of Lovenox 40mg SQ q24hr.   May need to stop before planed parathyroid surgery in 2wks.   ASA and DOAC not ideal as duration to come on and off ASA or DOAC would likely put pt at increased risk for VTE due to longer washout periord.   Pt agreeable to self injection.   Advised to stay mobile, if clear by ortho and PT/OT.     Pt likely to need 30d of DVT PPX post parathyroid surgery as well. Timing to restart depending on hemostasis post  parathyroid surgery.   Consideration to reschedule surgery to later date if clinically warranted.     If decline in  Hgb post surgery, then transfuse PRBC  Transfuse PRBC if Hgb <7, if symptomatic of acute bleeding.  If bleeding or suspected bleeding, image  hip/leg, as some hematoma post fracture common.      Discussed w Medicine    Can follow in office non-urgent post DC eg in 4-6wks to follow up on anemia. .   Contact information given    Follow with urology or Onc post DC for prostate caner  Follow with PMD in [place] post DC  Follow with OP cardiology as per routine.     Thank you for consulting us.   No additional recommendations at current time.   Will sign off on case for now.   Please call, or re-consult if needed. 
The patient is a 77 year old male with a history of HTN, HL, CKD, hyperparathyroidism, prostate cancer who presents with a fall, found to have a hip fracture.    Plan:  - ECG with LVH related abnormalities  - Continue amlodipine 10 mg daily  - Sinus tachycardia - largely resolved  - Continue metoprolol succinate 25 mg daily  - Hold lisinopril until outpatient follow-up  - Ortho follow-up - increased drainage at surgical site noted - now appears improved  - Discharge planning
77M with hx of prostate cancer (no tx), hyperthyroidism, CKD, HTN, HLD, r hypercalcemia (found to have parathyroid nodule),  hyperparathyroidism.planned for parathyroidectomy, and was getting preop labs tday, .c/o left thigh pain, mild head pain s/p trip and fall. As he was leaving pre surgical testing he bumped into a scale and fell. No LOC. No blood thinners.Difficulty walking afterwards due to pain.   In ED afebrile,  /82, calium levels normal, ortho consult called and is being admitted  left INtertrochanteric femur fracture with OP  CKD  HTN  HLD   hyperparathyroidism  Thyroid nodule   lacunar infarcts age indeterminate  h/o prostate ca   anemi aof ch disease with acute decrease in HB due to likely blood loss  s/p ORIF left on 1/24/23    
77M with hx of prostate cancer (no tx), hyperthyroidism, CKD, HTN, HLD, r hypercalcemia (found to have parathyroid nodule),  hyperparathyroidism.planned for parathyroidectomy, and was getting preop labs tday, .c/o left thigh pain, mild head pain s/p trip and fall. As he was leaving pre surgical testing he bumped into a scale and fell. No LOC. No blood thinners.Difficulty walking afterwards due to pain.   In ED afebrile,  /82, calium levels normal, ortho consult called and is being admitted  left INtertrochanteric femur fracture with OP s/p ORIF left on 1/24/23  CASPER with CKD base line creat 1.5  HTN  HLD   hyperparathyroidism  Thyroid nodule   lacunar infarcts age indeterminate  h/o prostate ca   anemia of ch disease with acute decrease in HB due to  blood loss post op received one unit PRBC, HB stable >9  Sinus tachycardia, no fever, no evidence of infection , DVT ruled out , pt on DVt pro, TSH normal , pt on methimazole, started on metoprolol as cardiology , improved   cardiology cleared for discharge   DC home if OK with ortho  
77M with hx of prostate cancer (no tx), hyperthyroidism, CKD, HTN, HLD, r hypercalcemia (found to have parathyroid nodule),  hyperparathyroidism.planned for parathyroidectomy, and was getting preop labs tday, .c/o left thigh pain, mild head pain s/p trip and fall. As he was leaving pre surgical testing he bumped into a scale and fell. No LOC. No blood thinners.Difficulty walking afterwards due to pain.   In ED afebrile,  /82, calium levels normal, ortho consult called and is being admitted  left INtertrochanteric femur fracture with OP s/p ORIF left on 1/24/23  CASPER with CKD base line creat 1.5  HTN  HLD   hyperparathyroidism  Thyroid nodule   lacunar infarcts age indeterminate  h/o prostate ca   anemia of ch disease with acute decrease in HB due to  blood loss post op received one unit PRBC, HB stable >9  Sinus tachycardia, no fever, no evidence of infection , DVT ruled out , pt on DVt pro, TSH normal , pt on methimazole, started on metoprolol as cardiology , improved   cardiology cleared for discharge   DC home if OK with ortho  
77M with hx of prostate cancer (no tx), hyperthyroidism, CKD, HTN, HLD, r hypercalcemia (found to have parathyroid nodule),  hyperparathyroidism.planned for parathyroidectomy, and was getting preop labs tday, .c/o left thigh pain, mild head pain s/p trip and fall. As he was leaving pre surgical testing he bumped into a scale and fell. No LOC. No blood thinners.Difficulty walking afterwards due to pain.   In ED afebrile,  /82, calium levels normal, ortho consult called and is being admitted  left INtertrochanteric femur fracture with OP  CKD  HTN  HLD   hyperparathyroidism  Thyroid nodule   lacunar infarcts age indeterminate  h/o prostate ca   anemi aof ch disease with acute decrease in HB due to likely blood loss  s/p ORIF left on 1/24/23   PT    HB 7.9 advised prbc gived hb9.3  Sinus tachycardia, no fever, no evidence of infection , DVT ruled out , pt on DVt pro, TSH normal , pt on methimazole, started on metoprolol as cardiology , will observe   DC plan for AM

## 2023-01-30 NOTE — PROGRESS NOTE ADULT - PROBLEM SELECTOR PLAN 3
cont home meds nephro f up
cont hhome meds, nephro endo consult
cont home meds nephro f up

## 2023-01-30 NOTE — PROGRESS NOTE ADULT - PROBLEM SELECTOR PLAN 2
cont home meds
cont cardiac meds

## 2023-01-30 NOTE — PROGRESS NOTE ADULT - PROVIDER SPECIALTY LIST ADULT
Cardiology
Cardiology
Heme/Onc
Nephrology
Orthopedics
Cardiology
Cardiology
Orthopedics
Cardiology
Cardiology
Nephrology
Nephrology
Orthopedics
Internal Medicine

## 2023-01-30 NOTE — PROGRESS NOTE ADULT - SUBJECTIVE AND OBJECTIVE BOX
Patient is a 77y old  Male who presents with a chief complaint of Per H&P "77M with hx of prostate cancer (no tx), hyperthyroidism, CKD, HTN, HLD, r hypercalcemia (found to have parathyroid nodule),  hyperparathyroidism.planned for parathyroidectomy, and was getting preop labs tday, .c/o left thigh pain, mild head pain s/p trip and fall. As he was leaving pre surgical testing he bumped into a scale and fell. No LOC. No blood thinners.Difficulty walking afterwards due to pain."      (29 Jan 2023 16:04)      INTERVAL HPI/OVERNIGHT EVENTS:overnight events noted    Home Medications:  acetaminophen 500 mg oral tablet: 2 tab(s) orally every 8 hours (25 Jan 2023 10:27)  Carafate 1 g/10 mL oral suspension: 10 milliliter(s) orally 2 times a day (24 Jan 2023 17:56)  Ecotrin Adult Low Strength 81 mg oral delayed release tablet: 1 tab(s) orally every 12 hours x 4 weeks (28 Jan 2023 08:17)  Flonase 50 mcg/inh nasal spray: 2 spray(s) in each nostril once a day (24 Jan 2023 17:56)  methIMAzole 5 mg oral tablet: 0.5 tab(s) orally once a day (24 Jan 2023 17:56)  Multiple Vitamins oral tablet: 1 tab(s) orally once a day (25 Jan 2023 10:27)  omeprazole 20 mg oral delayed release capsule: 1 cap(s) orally once a day (24 Jan 2023 17:56)  PARoxetine 20 mg oral tablet: 1 tab(s) orally once a day  ***take with 30mg for TDD = 50mg*** (24 Jan 2023 17:56)  PARoxetine 30 mg oral tablet: 1 tab(s) orally once a day  ***take with 20mg for TDD = 50mg*** (24 Jan 2023 17:56)  polyethylene glycol 3350 oral powder for reconstitution: 17 gram(s) orally once a day (at bedtime) (25 Jan 2023 10:27)  pravastatin 20 mg oral tablet: 1 tab(s) orally once a day (at bedtime) (24 Jan 2023 17:56)  senna leaf extract oral tablet: 2 tab(s) orally once a day (at bedtime) (25 Jan 2023 10:27)  terazosin 5 mg oral capsule: 1 cap(s) orally once a day (at bedtime) (24 Jan 2023 17:56)      MEDICATIONS  (STANDING):  acetaminophen     Tablet .. 1000 milliGRAM(s) Oral every 8 hours  acetaminophen   IVPB .. 1000 milliGRAM(s) IV Intermittent once  amLODIPine   Tablet 10 milliGRAM(s) Oral daily  aspirin enteric coated 81 milliGRAM(s) Oral every 12 hours  atorvastatin 10 milliGRAM(s) Oral at bedtime  ceFAZolin   IVPB 2000 milliGRAM(s) IV Intermittent once  chlorhexidine 2% Cloths 1 Application(s) Topical once  cinacalcet 30 milliGRAM(s) Oral daily  methimazole 2.5 milliGRAM(s) Oral daily  metoprolol succinate ER 25 milliGRAM(s) Oral daily  multivitamin 1 Tablet(s) Oral daily  pantoprazole    Tablet 40 milliGRAM(s) Oral before breakfast  PARoxetine 20 milliGRAM(s) Oral daily  PARoxetine 30 milliGRAM(s) Oral daily  polyethylene glycol 3350 17 Gram(s) Oral at bedtime  senna 2 Tablet(s) Oral at bedtime  sucralfate suspension 1 Gram(s) Oral two times a day  tamsulosin 0.8 milliGRAM(s) Oral at bedtime    MEDICATIONS  (PRN):  aluminum hydroxide/magnesium hydroxide/simethicone Suspension 30 milliLiter(s) Oral every 4 hours PRN Dyspepsia  HYDROmorphone  Injectable 0.25 milliGRAM(s) IV Push every 10 minutes PRN Moderate Pain (4 - 6)  HYDROmorphone  Injectable 0.5 milliGRAM(s) IV Push every 10 minutes PRN Severe Pain (7 - 10)  magnesium hydroxide Suspension 30 milliLiter(s) Oral daily PRN Constipation  ondansetron Injectable 4 milliGRAM(s) IV Push once PRN Nausea and/or Vomiting  ondansetron Injectable 4 milliGRAM(s) IV Push every 6 hours PRN Nausea and/or Vomiting  oxyCODONE    IR 5 milliGRAM(s) Oral every 3 hours PRN Moderate Pain (4 - 6)  oxyCODONE    IR 10 milliGRAM(s) Oral every 3 hours PRN Severe Pain (7 - 10)      Allergies    ChlorproMAZINE Hydrochloride (Unknown)    Intolerances        REVIEW OF SYSTEMS:  CONSTITUTIONAL: No fever, weight loss, or fatigue  EYES: No eye pain, visual disturbances, or discharge  ENMT:  No difficulty hearing, tinnitus, vertigo; No sinus or throat pain  NECK: No pain or stiffness  BREASTS: No pain, masses, or nipple discharge  RESPIRATORY: No cough, wheezing, chills or hemoptysis; No shortness of breath  CARDIOVASCULAR: No chest pain, palpitations, dizziness, or leg swelling  GASTROINTESTINAL: No abdominal or epigastric pain. No nausea, vomiting, or hematemesis; No diarrhea or constipation. No melena or hematochezia.  GENITOURINARY: No dysuria, frequency, hematuria, or incontinence  NEUROLOGICAL: No headaches, memory loss, loss of strength, numbness, or tremors  SKIN: No itching, burning, rashes, or lesions   LYMPH NODES: No enlarged glands  ENDOCRINE: No heat or cold intolerance; No hair loss  MUSCULOSKELETAL: No joint pain or swelling; No muscle, back, or extremity pain  PSYCHIATRIC: No depression, anxiety, mood swings, or difficulty sleeping  HEME/LYMPH: No easy bruising, or bleeding gums  ALLERGY AND IMMUNOLOGIC: No hives or eczema    Vital Signs Last 24 Hrs  T(C): 36.8 (30 Jan 2023 07:48), Max: 37 (29 Jan 2023 21:17)  T(F): 98.3 (30 Jan 2023 07:48), Max: 98.6 (29 Jan 2023 21:17)  HR: 104 (30 Jan 2023 07:48) (76 - 104)  BP: 150/89 (30 Jan 2023 07:48) (134/81 - 157/83)  BP(mean): --  RR: 18 (30 Jan 2023 07:48) (17 - 18)  SpO2: 97% (30 Jan 2023 07:48) (95% - 97%)    Parameters below as of 30 Jan 2023 07:48  Patient On (Oxygen Delivery Method): room air        PHYSICAL EXAM:  GENERAL: NAD, well-groomed, well-developed  HEAD:  Atraumatic, Normocephalic  EYES: EOMI, PERRLA, conjunctiva and sclera clear  ENMT: Moist mucous membranes,   NECK: Supple, No JVD, Normal thyroid  NERVOUS SYSTEM:  Alert & Oriented X3, Good concentration; Motor Strength 5/5 B/L upper and lower extremities; DTRs 2+ intact and symmetric  CHEST/LUNG: Clear to percussion bilaterally; No rales, rhonchi, wheezing, or rubs  HEART: Regular rate and rhythm; No murmurs, rubs, or gallops  ABDOMEN: Soft, Nontender, Nondistended; Bowel sounds present  EXTREMITIES:  2+ Peripheral Pulses, No clubbing, cyanosis, or edema  LYMPH: No lymphadenopathy noted  SKIN: No rashes or lesions    LABS:                        9.7    7.42  )-----------( 190      ( 29 Jan 2023 06:00 )             28.5     01-30    140  |  107  |  24<H>  ----------------------------<  94  4.1   |  24  |  1.37<H>    Ca    9.1      30 Jan 2023 06:19    TPro  5.8<L>  /  Alb  2.6<L>  /  TBili  0.6  /  DBili  x   /  AST  34  /  ALT  50  /  AlkPhos  88  01-29        CAPILLARY BLOOD GLUCOSE              I&O's Summary    29 Jan 2023 07:01  -  30 Jan 2023 07:00  --------------------------------------------------------  IN: 0 mL / OUT: 1000 mL / NET: -1000 mL        RADIOLOGY & ADDITIONAL TESTS:    Imaging Personally Reviewed:  [x ] YES  [ ] NO    Consultant(s) Notes Reviewed:  [x ] YES  [ ] NO    Care Discussed with Consultants/Other Providers [x ] YES  [ ] NO

## 2023-01-30 NOTE — PROGRESS NOTE ADULT - SUBJECTIVE AND OBJECTIVE BOX
Chief Complaint: Fall    Interval Events: No events overnight.    Review of Systems:  General: No fevers, chills, weight gain  Skin: No rashes, color changes  Cardiovascular: No chest pain, orthopnea  Respiratory: No shortness of breath, cough  Gastrointestinal: No nausea, abdominal pain  Genitourinary: No incontinence, pain with urination  Musculoskeletal: No pain, swelling, decreased range of motion  Neurological: No headache, weakness  Psychiatric: No depression, anxiety  Endocrine: No weight gain, increased thirst  All other systems are comprehensively negative.    Physical Exam:  Vital Signs Last 24 Hrs  T(C): 36.8 (30 Jan 2023 07:48), Max: 37 (29 Jan 2023 21:17)  T(F): 98.3 (30 Jan 2023 07:48), Max: 98.6 (29 Jan 2023 21:17)  HR: 104 (30 Jan 2023 07:48) (76 - 104)  BP: 150/89 (30 Jan 2023 07:48) (134/81 - 157/83)  BP(mean): --  RR: 18 (30 Jan 2023 07:48) (17 - 18)  SpO2: 97% (30 Jan 2023 07:48) (95% - 97%)  Parameters below as of 30 Jan 2023 07:48  Patient On (Oxygen Delivery Method): room air  General: NAD  HEENT: MMM  Neck: No JVD, no carotid bruit  Lungs: CTAB  CV: RRR, nl S1/S2, no M/R/G  Abdomen: S/NT/ND, +BS  Extremities: No LE edema, no cyanosis  Neuro: AAOx3, non-focal  Skin: No rash    Labs:             01-30    140  |  107  |  24<H>  ----------------------------<  94  4.1   |  24  |  1.37<H>    Ca    9.1      30 Jan 2023 06:19    TPro  5.8<L>  /  Alb  2.6<L>  /  TBili  0.6  /  DBili  x   /  AST  34  /  ALT  50  /  AlkPhos  88  01-29                        9.7    7.42  )-----------( 190      ( 29 Jan 2023 06:00 )             28.5       Telemetry: Sinus rhythm

## 2023-02-07 ENCOUNTER — TRANSCRIPTION ENCOUNTER (OUTPATIENT)
Age: 78
End: 2023-02-07

## 2023-02-07 ENCOUNTER — APPOINTMENT (OUTPATIENT)
Dept: ORTHOPEDIC SURGERY | Facility: CLINIC | Age: 78
End: 2023-02-07
Payer: COMMERCIAL

## 2023-02-07 VITALS — HEIGHT: 69 IN | BODY MASS INDEX: 25.48 KG/M2 | WEIGHT: 172 LBS

## 2023-02-07 PROCEDURE — 73502 X-RAY EXAM HIP UNI 2-3 VIEWS: CPT

## 2023-02-07 PROCEDURE — 99024 POSTOP FOLLOW-UP VISIT: CPT

## 2023-02-07 NOTE — ASU PATIENT PROFILE, ADULT - NSSUBSTANCEUSE_GEN_ALL_CORE_SD
caffeine A-T Advancement Flap Text: The defect edges were debeveled with a #15 scalpel blade.  Given the location of the defect, shape of the defect and the proximity to free margins an A-T advancement flap was deemed most appropriate.  Using a sterile surgical marker, an appropriate advancement flap was drawn incorporating the defect and placing the expected incisions within the relaxed skin tension lines where possible.    The area thus outlined was incised deep to adipose tissue with a #15 scalpel blade.  The skin margins were undermined to an appropriate distance in all directions utilizing iris scissors.

## 2023-02-07 NOTE — ASU PATIENT PROFILE, ADULT - NSALCOHOLPROBLEMSRELYN_GEN_A_CORE_SD
Received a refill request for metformin.     According to Dr. Munoz's office visit note on 11/15/17:  Metformin XR 500mg TID with good compliance    Patient was given a 30 day supply on 12.4.17 with instructions to complete lab work for additional refills. Has not yet completed lab work. (Already ordered in EPIC).     Please advise    FUA 5.15.18   no

## 2023-02-08 ENCOUNTER — APPOINTMENT (OUTPATIENT)
Dept: SURGERY | Facility: HOSPITAL | Age: 78
End: 2023-02-08
Payer: COMMERCIAL

## 2023-02-08 ENCOUNTER — RESULT REVIEW (OUTPATIENT)
Age: 78
End: 2023-02-08

## 2023-02-08 ENCOUNTER — TRANSCRIPTION ENCOUNTER (OUTPATIENT)
Age: 78
End: 2023-02-08

## 2023-02-08 ENCOUNTER — OUTPATIENT (OUTPATIENT)
Dept: OUTPATIENT SERVICES | Facility: HOSPITAL | Age: 78
LOS: 1 days | Discharge: HOME | End: 2023-02-08
Payer: COMMERCIAL

## 2023-02-08 VITALS
TEMPERATURE: 98 F | HEART RATE: 85 BPM | OXYGEN SATURATION: 98 % | WEIGHT: 166.89 LBS | DIASTOLIC BLOOD PRESSURE: 69 MMHG | SYSTOLIC BLOOD PRESSURE: 128 MMHG | RESPIRATION RATE: 16 BRPM | HEIGHT: 68 IN

## 2023-02-08 VITALS
SYSTOLIC BLOOD PRESSURE: 107 MMHG | OXYGEN SATURATION: 97 % | RESPIRATION RATE: 16 BRPM | DIASTOLIC BLOOD PRESSURE: 62 MMHG | HEART RATE: 78 BPM

## 2023-02-08 DIAGNOSIS — Z98.890 OTHER SPECIFIED POSTPROCEDURAL STATES: Chronic | ICD-10-CM

## 2023-02-08 DIAGNOSIS — E21.3 HYPERPARATHYROIDISM, UNSPECIFIED: ICD-10-CM

## 2023-02-08 LAB
PTH INTACT, INTRAOP SPECIMEN 2: SIGNIFICANT CHANGE UP
PTH INTACT, INTRAOP SPECIMEN 3: SIGNIFICANT CHANGE UP
PTH INTACT, INTRAOP SPECIMEN 4: SIGNIFICANT CHANGE UP
PTH INTACT, INTRAOP SPECIMEN 5: SIGNIFICANT CHANGE UP
PTH INTACT, INTRAOP TIMING 2: SIGNIFICANT CHANGE UP
PTH INTACT, INTRAOP TIMING 3: SIGNIFICANT CHANGE UP
PTH INTACT, INTRAOP TIMING 4: SIGNIFICANT CHANGE UP
PTH INTACT, INTRAOP TIMING 5: SIGNIFICANT CHANGE UP
PTH INTACT, INTRAOPERATIVE 2: 171 PG/ML — HIGH (ref 15–65)
PTH INTACT, INTRAOPERATIVE 3: 65 PG/ML — SIGNIFICANT CHANGE UP (ref 15–65)
PTH INTACT, INTRAOPERATIVE 4: 56 PG/ML — SIGNIFICANT CHANGE UP (ref 15–65)
PTH INTACT, INTRAOPERATIVE 5: 53 PG/ML — SIGNIFICANT CHANGE UP (ref 15–65)
PTH-INTACT IO % DIF SERPL: 149 PG/ML — HIGH (ref 15–65)

## 2023-02-08 PROCEDURE — 88305 TISSUE EXAM BY PATHOLOGIST: CPT | Mod: 26

## 2023-02-08 PROCEDURE — 60271 REMOVAL OF THYROID: CPT

## 2023-02-08 PROCEDURE — 88332 PATH CONSLTJ SURG EA ADD BLK: CPT | Mod: 26

## 2023-02-08 PROCEDURE — 88307 TISSUE EXAM BY PATHOLOGIST: CPT | Mod: 26

## 2023-02-08 PROCEDURE — 13132 CMPLX RPR F/C/C/M/N/AX/G/H/F: CPT

## 2023-02-08 PROCEDURE — 60500 EXPLORE PARATHYROID GLANDS: CPT | Mod: 59

## 2023-02-08 PROCEDURE — 88331 PATH CONSLTJ SURG 1 BLK 1SPC: CPT | Mod: 26

## 2023-02-08 DEVICE — LIGATING CLIPS WECK HORIZON MEDIUM (BLUE) 24: Type: IMPLANTABLE DEVICE | Status: FUNCTIONAL

## 2023-02-08 DEVICE — TUBE EMG NIM TRIVANTAGE 7MM: Type: IMPLANTABLE DEVICE | Status: FUNCTIONAL

## 2023-02-08 DEVICE — LIGATING CLIPS WECK HORIZON SMALL-WIDE (RED) 24: Type: IMPLANTABLE DEVICE | Status: FUNCTIONAL

## 2023-02-08 RX ORDER — SODIUM CHLORIDE 9 MG/ML
1000 INJECTION, SOLUTION INTRAVENOUS
Refills: 0 | Status: DISCONTINUED | OUTPATIENT
Start: 2023-02-08 | End: 2023-02-22

## 2023-02-08 RX ORDER — OXYCODONE AND ACETAMINOPHEN 5; 325 MG/1; MG/1
1 TABLET ORAL EVERY 6 HOURS
Refills: 0 | Status: DISCONTINUED | OUTPATIENT
Start: 2023-02-08 | End: 2023-02-08

## 2023-02-08 RX ORDER — ASPIRIN/CALCIUM CARB/MAGNESIUM 324 MG
1 TABLET ORAL
Qty: 0 | Refills: 0 | DISCHARGE

## 2023-02-08 RX ORDER — BENZOCAINE AND MENTHOL 5; 1 G/100ML; G/100ML
1 LIQUID ORAL
Refills: 0 | Status: DISCONTINUED | OUTPATIENT
Start: 2023-02-08 | End: 2023-02-22

## 2023-02-08 RX ORDER — OXYCODONE HYDROCHLORIDE 5 MG/1
5 TABLET ORAL ONCE
Refills: 0 | Status: DISCONTINUED | OUTPATIENT
Start: 2023-02-08 | End: 2023-02-08

## 2023-02-08 RX ORDER — ACETAMINOPHEN 500 MG
650 TABLET ORAL EVERY 6 HOURS
Refills: 0 | Status: DISCONTINUED | OUTPATIENT
Start: 2023-02-08 | End: 2023-02-22

## 2023-02-08 RX ORDER — FLUTICASONE PROPIONATE 50 MCG
2 SPRAY, SUSPENSION NASAL
Qty: 0 | Refills: 0 | DISCHARGE

## 2023-02-08 RX ORDER — ONDANSETRON 8 MG/1
4 TABLET, FILM COATED ORAL ONCE
Refills: 0 | Status: DISCONTINUED | OUTPATIENT
Start: 2023-02-08 | End: 2023-02-22

## 2023-02-08 RX ORDER — OMEPRAZOLE 10 MG/1
1 CAPSULE, DELAYED RELEASE ORAL
Qty: 0 | Refills: 0 | DISCHARGE

## 2023-02-08 RX ORDER — CALCIUM CARBONATE 500(1250)
1 TABLET ORAL EVERY 6 HOURS
Refills: 0 | Status: DISCONTINUED | OUTPATIENT
Start: 2023-02-08 | End: 2023-02-22

## 2023-02-08 RX ORDER — CALCIUM CARBONATE 500(1250)
1 TABLET ORAL
Qty: 0 | Refills: 0 | DISCHARGE
Start: 2023-02-08

## 2023-02-08 RX ORDER — TERAZOSIN HYDROCHLORIDE 10 MG/1
1 CAPSULE ORAL
Qty: 0 | Refills: 0 | DISCHARGE

## 2023-02-08 RX ORDER — FENTANYL CITRATE 50 UG/ML
50 INJECTION INTRAVENOUS
Refills: 0 | Status: DISCONTINUED | OUTPATIENT
Start: 2023-02-08 | End: 2023-02-08

## 2023-02-08 RX ORDER — METHIMAZOLE 10 MG/1
0.5 TABLET ORAL
Qty: 0 | Refills: 0 | DISCHARGE

## 2023-02-08 RX ADMIN — Medication 1 TABLET(S): at 18:30

## 2023-02-08 NOTE — ASU DISCHARGE PLAN (ADULT/PEDIATRIC) - FOLLOW UP APPOINTMENTS
May also call Recovery Room (PACU) 24/7 @ [phone]/[facility] May also call Recovery Room (PACU) 24/7 @ [phone]

## 2023-02-08 NOTE — ASU PREOP CHECKLIST - WEIGHT IN KG
75.7 Mercedes Flap Text: The defect edges were debeveled with a #15 scalpel blade.  Given the location of the defect, shape of the defect and the proximity to free margins a Mercedes flap was deemed most appropriate.  Using a sterile surgical marker, an appropriate advancement flap was drawn incorporating the defect and placing the expected incisions within the relaxed skin tension lines where possible. The area thus outlined was incised deep to adipose tissue with a #15 scalpel blade.  The skin margins were undermined to an appropriate distance in all directions utilizing iris scissors.

## 2023-02-08 NOTE — BRIEF OPERATIVE NOTE - NSICDXBRIEFPROCEDURE_GEN_ALL_CORE_FT
PROCEDURES:  Parathyroidectomy 08-Feb-2023 13:05:11  Tiffany Bernal  Left lobe thyroidectomy 08-Feb-2023 13:05:23  Tiffany Bernal

## 2023-02-08 NOTE — BRIEF OPERATIVE NOTE - OPERATION/FINDINGS
Left substernal thyroidectomy, isthmusectomy, left superior parathyroidectomy with nerve monitoring. Appropriate decrease in PTH intraoperatively. Drain left in pretracheal space.

## 2023-02-08 NOTE — BRIEF OPERATIVE NOTE - NSICDXBRIEFPOSTOP_GEN_ALL_CORE_FT
POST-OP DIAGNOSIS:  Primary hyperparathyroidism 08-Feb-2023 13:04:56  Tiffany Bernal  Thyroid nodule 08-Feb-2023 13:04:58  Tiffany Bernal

## 2023-02-08 NOTE — H&P ADULT - HISTORY OF PRESENT ILLNESS
77M presents for parathyroidectomy with parathyroid hormone assay, possible thyroid lobectomy, possible paratracheal node dissection. Pt presented to his PCP reporting fatigue, bone pain, constipation, decreased appetite and visual disturbances. Lab results revealed a calcium of 14.2 and PTH of 585. Pt was hospitalized and started on Sensipar. On Jan 23rd, pt. presented to [facility], however tripped and fell, found to have L hip intertrochanteric fx, now s/p ORIF 1/24. Patient now re-presenting for scheduled case.

## 2023-02-08 NOTE — H&P ADULT - ASSESSMENT
77M with fatigue, bone, pain, constipation, decreased appetite presents for parathyroidectomy with parathyroid hormone assay, possible thyroid lobectomy, possible paratracheal node dissection. S/p ORIF of L hip intertrochanteric fx 1/24.    PLAN:  - OR today with Dr. Lundy   - Pt. to take amlodipine, methimazole, paroxetine as prescribed prior to surgery     [facility] Surgery  #[id number]

## 2023-02-08 NOTE — ASU DISCHARGE PLAN (ADULT/PEDIATRIC) - CARE PROVIDER_API CALL
Low Lundy)  Plastic Surgery  [address] [place] [address] [address]  [place] [address]  Phone: [phone]  Fax: [phone]  Follow Up Time:

## 2023-02-08 NOTE — BRIEF OPERATIVE NOTE - NSICDXBRIEFPREOP_GEN_ALL_CORE_FT
PRE-OP DIAGNOSIS:  Primary hyperparathyroidism 08-Feb-2023 13:04:42  Tiffany Bernal  Thyroid nodule 08-Feb-2023 13:04:52  Tiffany Bernal

## 2023-02-08 NOTE — H&P ADULT - ATTENDING COMMENTS
parathyroid localized to left side, for parathyroidectomy, possible thyroidectomy, recurrent nerve monitoring

## 2023-02-09 ENCOUNTER — APPOINTMENT (OUTPATIENT)
Dept: SURGERY | Facility: CLINIC | Age: 78
End: 2023-02-09
Payer: COMMERCIAL

## 2023-02-09 PROCEDURE — 99024 POSTOP FOLLOW-UP VISIT: CPT

## 2023-02-09 NOTE — HISTORY OF PRESENT ILLNESS
[de-identified] : 1 day s/p thyroid lobectomy and parathyroidectomy.  denies dysphagia, hoarseness.  continues calcium. discontinued methimazole. I have reviewed all old and new data and available images. Additional information was obtained from others present at the time of visit to ensure the completeness of the history

## 2023-02-09 NOTE — PHYSICAL EXAM
[de-identified] : scar intact. drain in place [de-identified] : no palpable thyroid nodules [Nasal Endoscopy Performed] : nasal endoscopy was performed, see procedure section for findings [Midline] : located in midline position [Normal] : orientation to person, place, and time: normal

## 2023-02-09 NOTE — ASSESSMENT
[FreeTextEntry1] : drain removed. instructed in maneuvers to minimize scarring. to continue calcium. bloods next visit. to return earlier if any change.  patient has been given the opportunity to ask questions, and all of the patient's questions have been answered to their satisfaction

## 2023-02-14 ENCOUNTER — APPOINTMENT (OUTPATIENT)
Dept: SURGERY | Facility: CLINIC | Age: 78
End: 2023-02-14

## 2023-02-14 ENCOUNTER — APPOINTMENT (OUTPATIENT)
Dept: ULTRASOUND IMAGING | Facility: CLINIC | Age: 78
End: 2023-02-14
Payer: COMMERCIAL

## 2023-02-14 ENCOUNTER — OUTPATIENT (OUTPATIENT)
Dept: OUTPATIENT SERVICES | Facility: HOSPITAL | Age: 78
LOS: 1 days | End: 2023-02-14
Payer: COMMERCIAL

## 2023-02-14 ENCOUNTER — NON-APPOINTMENT (OUTPATIENT)
Age: 78
End: 2023-02-14

## 2023-02-14 DIAGNOSIS — Z98.890 OTHER SPECIFIED POSTPROCEDURAL STATES: Chronic | ICD-10-CM

## 2023-02-14 DIAGNOSIS — S72.002D FRACTURE OF UNSPECIFIED PART OF NECK OF LEFT FEMUR, SUBSEQUENT ENCOUNTER FOR CLOSED FRACTURE WITH ROUTINE HEALING: ICD-10-CM

## 2023-02-14 LAB — SURGICAL PATHOLOGY STUDY: SIGNIFICANT CHANGE UP

## 2023-02-14 PROCEDURE — 93971 EXTREMITY STUDY: CPT

## 2023-02-14 PROCEDURE — 93971 EXTREMITY STUDY: CPT | Mod: 26,LT

## 2023-02-16 ENCOUNTER — APPOINTMENT (OUTPATIENT)
Dept: SURGERY | Facility: CLINIC | Age: 78
End: 2023-02-16

## 2023-02-17 ENCOUNTER — APPOINTMENT (OUTPATIENT)
Dept: ULTRASOUND IMAGING | Facility: CLINIC | Age: 78
End: 2023-02-17
Payer: COMMERCIAL

## 2023-02-17 PROCEDURE — 93971 EXTREMITY STUDY: CPT | Mod: LT

## 2023-02-21 ENCOUNTER — NON-APPOINTMENT (OUTPATIENT)
Age: 78
End: 2023-02-21

## 2023-02-22 ENCOUNTER — RX RENEWAL (OUTPATIENT)
Age: 78
End: 2023-02-22

## 2023-02-27 ENCOUNTER — FORM ENCOUNTER (OUTPATIENT)
Age: 78
End: 2023-02-27

## 2023-02-27 ENCOUNTER — NON-APPOINTMENT (OUTPATIENT)
Age: 78
End: 2023-02-27

## 2023-02-28 ENCOUNTER — RESULT REVIEW (OUTPATIENT)
Age: 78
End: 2023-02-28

## 2023-02-28 ENCOUNTER — APPOINTMENT (OUTPATIENT)
Dept: ULTRASOUND IMAGING | Facility: CLINIC | Age: 78
End: 2023-02-28
Payer: COMMERCIAL

## 2023-02-28 ENCOUNTER — OUTPATIENT (OUTPATIENT)
Dept: OUTPATIENT SERVICES | Facility: HOSPITAL | Age: 78
LOS: 1 days | End: 2023-02-28
Payer: COMMERCIAL

## 2023-02-28 VITALS
SYSTOLIC BLOOD PRESSURE: 138 MMHG | TEMPERATURE: 97.3 F | WEIGHT: 167 LBS | BODY MASS INDEX: 24.73 KG/M2 | DIASTOLIC BLOOD PRESSURE: 68 MMHG | HEIGHT: 69 IN

## 2023-02-28 DIAGNOSIS — Z98.890 OTHER SPECIFIED POSTPROCEDURAL STATES: Chronic | ICD-10-CM

## 2023-02-28 DIAGNOSIS — N18.30 CHRONIC KIDNEY DISEASE, STAGE 3 UNSPECIFIED: ICD-10-CM

## 2023-02-28 PROCEDURE — 93971 EXTREMITY STUDY: CPT | Mod: 26,LT

## 2023-02-28 PROCEDURE — 93971 EXTREMITY STUDY: CPT

## 2023-03-07 ENCOUNTER — APPOINTMENT (OUTPATIENT)
Dept: ORTHOPEDIC SURGERY | Facility: CLINIC | Age: 78
End: 2023-03-07
Payer: COMMERCIAL

## 2023-03-07 DIAGNOSIS — S93.492A SPRAIN OF OTHER LIGAMENT OF LEFT ANKLE, INITIAL ENCOUNTER: ICD-10-CM

## 2023-03-07 PROCEDURE — 73502 X-RAY EXAM HIP UNI 2-3 VIEWS: CPT

## 2023-03-07 PROCEDURE — 99024 POSTOP FOLLOW-UP VISIT: CPT

## 2023-03-07 NOTE — HISTORY OF PRESENT ILLNESS
[de-identified] : 42 days s/p Cephalomedullary nailing, left hip fracture on 01/24/2023. [de-identified] : 77 year old male presents for a post operative evaluation s/p Cephalomedullary nailing, left hip fracture on 01/24/2023. He reports edema and mild to moderate postoperative pain. He additionally reports pain laterally to the left ankle. He presents to the office ambulating with the assistance of a cane. [de-identified] : Left Lower Extremity\par o Hip :\par ¦ Inspection/Palpation : no tenderness, no swelling, no deformity\par ¦ Range of Motion : markedly limited IR. moderately limited ER.\par ¦ Stability : joint stability intact\par ¦ Strength : not accessed\par o Sensation : sensation to light touch intact\par \par Left Lower Extremity\par o Ankle :\par ¦ Inspection/Palpation : ATFL tenderness.\par deltoid ligament tenderness\par ¦ Range of Motion : arc of motion full and painless in all planes\par ¦ Stability : no joint instability on provocative testing\par ¦ Strength : not accessed\par o Muscle Bulk : no atrophy\par o Sensation : sensation intact to light touch\par o Skin : no skin lesions, no discoloration\par o Vascular Exam : o Vascular Exam : 1 to 2+ pitting edema of the lower leg. 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  [de-identified] : We discussed possible conversion of hardware to a total hip replacement if he continues to have pain in the long term.\par \par A prescription for Physical Therapy was provided.\par \par Follow up in 6 weeks.

## 2023-03-07 NOTE — ADDENDUM
[FreeTextEntry1] : All medical record entries made by the Sabinoibclarissa were at my, Dr. Moustapha Anthony, direction and personally dictated by me on 03/07/2023. I have reviewed the chart and agree that the record accurately reflects my personal performance of the history, physical exam, assessment and plan. I have also personally directed, reviewed, and agreed with the chart.

## 2023-03-09 ENCOUNTER — APPOINTMENT (OUTPATIENT)
Dept: SURGERY | Facility: CLINIC | Age: 78
End: 2023-03-09
Payer: COMMERCIAL

## 2023-03-09 DIAGNOSIS — E05.90 THYROTOXICOSIS, UNSPECIFIED W/OUT THYROTOXIC CRISIS OR STORM: ICD-10-CM

## 2023-03-09 PROCEDURE — 99024 POSTOP FOLLOW-UP VISIT: CPT

## 2023-03-09 RX ORDER — METHIMAZOLE 5 MG/1
5 TABLET ORAL
Refills: 0 | Status: COMPLETED | COMMUNITY
End: 2023-03-09

## 2023-03-09 NOTE — ASSESSMENT
[FreeTextEntry1] : s/p thyroid lobectomy and parathyroidectomy\par path discussed\par labs per Endo\par f/u 3 months

## 2023-03-09 NOTE — PHYSICAL THERAPY INITIAL EVALUATION ADULT - LEVEL OF INDEPENDENCE: SIT/SUPINE, REHAB EVAL
supervision Orbicularis Oris Muscle Flap Text: The defect edges were debeveled with a #15 scalpel blade.  Given that the defect affected the competency of the oral sphincter an obicularis oris muscle flap was deemed most appropriate to restore this competency and normal muscle function.  Using a sterile surgical marker, an appropriate flap was drawn incorporating the defect. The area thus outlined was incised with a #15 scalpel blade.

## 2023-03-09 NOTE — HISTORY OF PRESENT ILLNESS
[de-identified] : Pt 3 weeks s/p Thyroid lobectomy and parathyroidectomy doing well had labs by Endo all reports reviewed

## 2023-03-14 ENCOUNTER — NON-APPOINTMENT (OUTPATIENT)
Age: 78
End: 2023-03-14

## 2023-03-14 ENCOUNTER — APPOINTMENT (OUTPATIENT)
Dept: ENDOCRINOLOGY | Facility: CLINIC | Age: 78
End: 2023-03-14

## 2023-03-14 DIAGNOSIS — U07.1 COVID-19: ICD-10-CM

## 2023-03-14 DIAGNOSIS — F41.9 ANXIETY DISORDER, UNSPECIFIED: ICD-10-CM

## 2023-03-14 DIAGNOSIS — E78.5 HYPERLIPIDEMIA, UNSPECIFIED: ICD-10-CM

## 2023-03-14 DIAGNOSIS — E03.9 HYPOTHYROIDISM, UNSPECIFIED: ICD-10-CM

## 2023-03-14 DIAGNOSIS — F42.9 OBSESSIVE-COMPULSIVE DISORDER, UNSPECIFIED: ICD-10-CM

## 2023-03-14 DIAGNOSIS — Z82.3 FAMILY HISTORY OF STROKE: ICD-10-CM

## 2023-03-14 DIAGNOSIS — R79.89 OTHER SPECIFIED ABNORMAL FINDINGS OF BLOOD CHEMISTRY: ICD-10-CM

## 2023-03-14 DIAGNOSIS — C61 MALIGNANT NEOPLASM OF PROSTATE: ICD-10-CM

## 2023-03-14 DIAGNOSIS — N28.1 CYST OF KIDNEY, ACQUIRED: ICD-10-CM

## 2023-03-14 DIAGNOSIS — Z82.49 FAMILY HISTORY OF ISCHEMIC HEART DISEASE AND OTHER DISEASES OF THE CIRCULATORY SYSTEM: ICD-10-CM

## 2023-03-14 RX ORDER — FLUTICASONE PROPIONATE 50 UG/1
50 SPRAY, METERED NASAL
Refills: 0 | Status: ACTIVE | COMMUNITY

## 2023-03-14 RX ORDER — PAROXETINE HYDROCHLORIDE 20 MG/1
20 TABLET, FILM COATED ORAL DAILY
Refills: 0 | Status: ACTIVE | COMMUNITY

## 2023-03-30 ENCOUNTER — APPOINTMENT (OUTPATIENT)
Dept: ORTHOPEDIC SURGERY | Facility: CLINIC | Age: 78
End: 2023-03-30

## 2023-04-18 ENCOUNTER — APPOINTMENT (OUTPATIENT)
Dept: ORTHOPEDIC SURGERY | Facility: CLINIC | Age: 78
End: 2023-04-18
Payer: COMMERCIAL

## 2023-04-18 PROCEDURE — 73502 X-RAY EXAM HIP UNI 2-3 VIEWS: CPT

## 2023-04-18 PROCEDURE — 99024 POSTOP FOLLOW-UP VISIT: CPT

## 2023-05-22 NOTE — DISCHARGE NOTE NURSING/CASE MANAGEMENT/SOCIAL WORK - NSTRANSFERBELONGINGSDISPO_GEN_A_NUR
with patient Source of information: TOM HERRERA, Chart review  Patient language: English  : n/a    HPI:  Patient is a 87 y/o M from [facility] who walks with a walker and has PMH of HTN, HLD, venous insufficiency, CAD, BPH, CHF who presented s/p mechanical fall. Patient reports that he was by his bed when he felt lightheaded and felt like he might pass out and had a fall. Patient denies hitting his head or any other bodily injury. Patient denies any loss of consciousness and reports he remembers the entire episode. Patient denies any pain at this time. Patient also reported that since being in the ED he is in a lot of discomfort as he has a very strong urge to urinate but he is not able to. Patient denies any history of fevers, chills, headache, prior lightheadedness, chest pain, palpitations, shortness of breath and cough (contradictory to ED note), nausea, vomiting, abdominal pain, diarrhea, constipation, melena, hematochezia, or other urinary symptoms.  (11 May 2023 09:20)    CTAP demonstrates- several thoracic and lumbar vertebral compression fractures but with no discrete fracture line or adjacent edema. Mild depression of the superior endplate of T1. Chronic moderate to severe compression T4 vertebral body. Mild compression T5, T6, T7, T8, T9, T10, T11 and T12 vertebral bodies. Chronic moderate compression of T3 vertebral body with focal right scoliosis centered at this level. Status post posterior fusion T2-sacroiliac. The hardware is intact. Bony demineralization.    CT head: No evidence of intracranial injury or skull fracture.    CT cervical spine: No fracture or dislocation of the cervical spine.    CT Chest abdomen pelvis:No evidence of acute traumatic injury. Multilevel thoracic and lumbar vertebral compression fractures are   difficult to definitively age though there are no visible fracture lines or adjacent edema to suggest acute injury. Comparison with priors would be helpful.  Dependent densities in the lower lungs most likely atelectasis given the low lung volumes. Superimposed pneumonia less likely.    Pt is admitted for hypoxemia, s/p fall.  CXR demonstrates- Continued elevation of right hemidiaphragm. Right lower lung linear atelectasis and possible trace right pleural effusion. Pain consulted for back pain, chronic on 5/15. Pt is opioid dependent. Per [facility]top, Oxycodone 20mg PO 4x/day, last filled 5/4. Pt  Pt seen and examined at bedside this morning. Pt found laying in bed, asleep, observed resting comfortably, no distress noted. Once awake, alert and oriented x 3. Pt reports lumbar back pain score 10/10 and uncomfortable. SCALE USED: (1-10 VNRS). Pt also c/o penile pain, currently with Moreno and CBI in progress. Alson, c/o bladder spasms worts with moreno catheter. Pt describes pain as dull, constant, non- radiating, exacerbated by movement. Pt medicated with Oxycodone 20 mg po this morning. Denies numbness/ tingling. Reports generalized weakness. Pt tolerating PO diet. Denies lethargy, chest pain, SOB, nausea, vomiting, constipation. Unable to recall last BM. Patient stated goal for pain control: to be able to take deep breaths, get out of bed to chair and ambulate with tolerable pain control.     PAST MEDICAL & SURGICAL HISTORY:  Hypertension    Hyperlipidemia    Venous insufficiency    FAMILY HISTORY:    Social History:  Patient reports he is a former smoker but he quit smoking 40 years ago.   Patient denies alcohol or illicit drug use. (11 May 2023 09:20)   [x]Denies ETOH use, illicit drug use, and smoking     Allergies    No Known Allergies    Intolerances    MEDICATIONS  (STANDING):  albuterol    90 MICROgram(s) HFA Inhaler 2 Puff(s) Inhalation every 6 hours  amLODIPine   Tablet 5 milliGRAM(s) Oral daily  aspirin  chewable 81 milliGRAM(s) Oral daily  atorvastatin 80 milliGRAM(s) Oral at bedtime  baclofen 10 milliGRAM(s) Oral every 12 hours  DULoxetine 60 milliGRAM(s) Oral daily  furosemide    Tablet 20 milliGRAM(s) Oral daily  lidocaine   4% Patch 2 Patch Transdermal daily  metoprolol tartrate 25 milliGRAM(s) Oral two times a day  oxybutynin 5 milliGRAM(s) Oral every 8 hours  polyethylene glycol 3350 17 Gram(s) Oral daily  senna 2 Tablet(s) Oral at bedtime  sodium chloride 0.9% lock flush 3 milliLiter(s) IV Push every 8 hours  tamsulosin 0.4 milliGRAM(s) Oral at bedtime    MEDICATIONS  (PRN):  acetaminophen     Tablet .. 1000 milliGRAM(s) Oral every 8 hours PRN Moderate Pain (4 - 6)  melatonin 3 milliGRAM(s) Oral at bedtime PRN Insomnia  ondansetron Injectable 4 milliGRAM(s) IV Push every 6 hours PRN Nausea and/or Vomiting  oxyCODONE    IR 20 milliGRAM(s) Oral every 6 hours PRN Severe Pain (7 - 10)    Vital Signs Last 24 Hrs  T(C): 36.7 (22 May 2023 08:49), Max: 36.7 (21 May 2023 21:00)  T(F): 98 (22 May 2023 08:49), Max: 98 (21 May 2023 21:00)  HR: 65 (22 May 2023 08:49) (65 - 90)  BP: 143/77 (22 May 2023 08:49) (85/45 - 156/83)  BP(mean): --  RR: 18 (22 May 2023 08:49) (18 - 20)  SpO2: 98% (22 May 2023 08:49) (90% - 98%)    Parameters below as of 22 May 2023 08:49  Patient On (Oxygen Delivery Method): nasal cannula  O2 Flow (L/min): 2    COVID-19 PCR: NotDetec (16 May 2023 09:14)    LABS: Reviewed                        12.6   9.54  )-----------( 207      ( 22 May 2023 06:37 )             39.7     05-22    139  |  109<H>  |  38<H>  ----------------------------<  113<H>  4.0   |  21<L>  |  2.06<H>    Ca    8.7      22 May 2023 06:37    CAPILLARY BLOOD GLUCOSE    COVID-19 PCR: NotDetec (16 May 2023 09:14)    Radiology: Reviewed  ACC: [id number] EXAM:  XR CHEST PORTABLE ROUTINE 1V   ORDERED BY: TAVON MCDERMOTT     PROCEDURE DATE:  05/15/2023      INTERPRETATION:  TIME OF EXAM: May 15, 2023 at 9:05 AM.    CLINICAL INFORMATION: Hypertension. Atelectasis.    COMPARISON:  APchest x-ray and CT scan of the chest from May 11, 2023.    TECHNIQUE:   AP Portable chest x-ray. Limited by rotation. Jewelry   projects over the left chest.    INTERPRETATION:    Heart size and the mediastinum cannot be accurately evaluated on this   projection.  Elevated right hemidiaphragm again seen.  There is right lower lung linear atelectasis with a possible trace right   pleural effusion.  The left lung is clear.  No left pleural effusion seen.  No pneumothorax.  Incompletely imaged orthopedic hardware projects over the thoracolumbar   spine.    IMPRESSION:  Limited by rotation.    Continued elevation of right hemidiaphragm.    Right lower lung linear atelectasis and possible trace right pleural   effusion.    --- End of Report ---    SKY GARZA MD; Attending Radiologist  This document has been electronically signed. May 16 2023  4:46PM  ACC: [id number] EXAM:  CT ABDOMEN AND PELVIS IC   ORDERED BY: FADUMO SANTOS     ACC: [id number] EXAM:  CT CHEST IC   ORDERED BY: FADUMO SANTOS     ACC: [id number] EXAM:  CT BRAIN   ORDERED BY: FADUMO SANTOS     ACC: [id number] EXAM:  CT CERVICALSPINE   ORDERED BY: FADUMO SANTOS     PROCEDURE DATE:  05/11/2023      INTERPRETATION:  CLINICAL INFORMATION: Back pain after fall. CHF,   hypertension, shortness of breath.    COMPARISON: Chest radiograph same date.    CONTRAST/COMPLICATIONS:  90 cc Omnipaque 350 administered intravenously for the chest abdomen   pelvis.    PROCEDURE:  CT head and cervical spine without intravenous contrast.  CT of the Chest, Abdomen and Pelvis with intravenous contrast.  Sagittal and coronal reformats were performed.    FINDINGS:    HEAD:  BRAIN: No apparent acute infarct, hemmorhage or mass. No hydrocephalus.   Chronic appearing white matter hypodensities and volume loss.  CALVARIUM: No skull fracture or agreesive osseous legions.  EXTRACRANIAL SOFTTISSUES: No acute findings.  VISUALIZED PORTIONS OF THE PARANASAL SINUSES AND MASTOID AIR CELLS: No   air fluid levels.    CERVICAL SPINE:  VERTEBRAE: No fracture or dislocation. Exaggeration of cervical lordosis.   Mild degenerative retrolisthesis of C3 on C4 and mild degenerative   anterolisthesis of C6 on C7 and C7 on T1.  SPINAL CANAL AND FORAMINA: Multilevel degenerative changes with no   apparent high grade spinal canal narrowing.  PARASPINAL SOFT TISSUES: No paravertebral hematoma or acutefinding.    CHEST:  LUNGS AND LARGE AIRWAYS: Low lung volumes with right greater than left   basilar opacities and mild elevation of the right hemidiaphragm. No   pulmonary nodules.  PLEURA: No pleural effusion or pneumothorax.  VESSELS: No evidence of injury to the heart and great vessels. No   thoracic aortic dissection or aneurysm.  HEART: Mild four-chamber cardiomegaly. No pericardial effusion.  MEDIASTINUM AND LUIS ALBERTO: No lymphadenopathy.  CHEST WALL AND LOWER NECK: Within normal limits.    ABDOMEN AND PELVIS:  LIVER: Within normal limits.  BILE DUCTS: Normal caliber.  GALLBLADDER: Within normal limits.  SPLEEN: No evidence of splenic injury or concerning finding. Small cystic   lesion upper spleen.  PANCREAS: Within normal limits.  ADRENALS: Within normal limits.  KIDNEYS/URETERS: No evidence of renal injury. No hydronephrosis or renal   stone or concerning mass. Small cysts bilaterally.    BLADDER: Within normal limits.  REPRODUCTIVE ORGANS: Radiation seeds in the prostate.    BOWEL: No bowel obstruction. Appendix is not visualized. No evidence of   inflammation in the pericecal region.  PERITONEUM: No ascites.  VESSELS: No abdominal aortic aneurysm. Calcific atherosclerosis   particularly of the distal abdominal aorta and iliac arteries.  RETROPERITONEUM/LYMPH NODES: No lymphadenopathy.  ABDOMINAL WALL: Large right lower quadrant anterior abdominal wall hernia   extending into the right inguinal region, not fully visible, contains the   cecum, much of the ascending colon, anddistal ileum. No evidence of   obstruction or other acute complication regarding this hernia.  BONES: Several thoracic and lumbar vertebral compression fractures but   with no discrete fracture line or adjacent edema. Mild depression of the   superior endplate of T1. Chronic moderate to severe compression T4   vertebral body. Mild compression T5, T6, T7, T8, T9, T10, T11 and T12   vertebral bodies. Chronic moderate compression of T3 vertebral body with   focal right scoliosis centered at this level. Status post posterior   fusion T2-sacroiliac. The hardware is intact. Bony demineralization.    IMPRESSION:  CT head: No evidence of intracranial injury or skull fracture.    CT cervical spine: No fracture or dislocation of the cervical spine.    CT Chest abdomen pelvis:  No evidence of acute traumatic injury.  Multilevel thoracic and lumbar vertebral compression fractures are   difficult to definitively age though there are no visible fracture lines   or adjacent edema to suggest acute injury. Comparison with priors would   be helpful.  Dependent densities in the lower lungs most likely atelectasis given the   low lung volumes. Superimposed pneumonia less likely.    --- End of Report ---     ALEX MACIEL MD; Attending Radiologist  This document has been electronically signed. May 11 2023  5:55AM  ACC: [id number] EXAM:  CT CHEST IC   ORDERED BY: FADUMO SANTOS     ACC: [id number] EXAM:  CT BRAIN   ORDERED BY: FADUMO SANTOS     ACC: [id number] EXAM:  CT CERVICALSPINE   ORDERED BY: FADUMO SANTOS     PROCEDURE DATE:  05/11/2023          INTERPRETATION:  CLINICAL INFORMATION: Back pain after fall. CHF,   hypertension, shortness of breath.    COMPARISON: Chest radiograph same date.    CONTRAST/COMPLICATIONS:  90 cc Omnipaque 350 administered intravenously for the chest abdomen   pelvis.    PROCEDURE:  CT head and cervical spine without intravenous contrast.  CT of the Chest, Abdomen and Pelvis with intravenous contrast.  Sagittal and coronal reformats were performed.    FINDINGS:    HEAD:  BRAIN: No apparent acute infarct, hemmorhage or mass. No hydrocephalus.   Chronic appearing white matter hypodensities and volume loss.  CALVARIUM: No skull fracture or agreesive osseous legions.  EXTRACRANIAL SOFTTISSUES: No acute findings.  VISUALIZED PORTIONS OF THE PARANASAL SINUSES AND MASTOID AIR CELLS: No   air fluid levels.    CERVICAL SPINE:  VERTEBRAE: No fracture or dislocation. Exaggeration of cervical lordosis.   Mild degenerative retrolisthesis of C3 on C4 and mild degenerative   anterolisthesis of C6 on C7 and C7 on T1.  SPINAL CANAL AND FORAMINA: Multilevel degenerative changes with no   apparent high grade spinal canal narrowing.  PARASPINAL SOFT TISSUES: No paravertebral hematoma or acutefinding.    CHEST:  LUNGS AND LARGE AIRWAYS: Low lung volumes with right greater than left   basilar opacities and mild elevation of the right hemidiaphragm. No   pulmonary nodules.  PLEURA: No pleural effusion or pneumothorax.  VESSELS: No evidence of injury to the heart and great vessels. No   thoracic aortic dissection or aneurysm.  HEART: Mild four-chamber cardiomegaly. No pericardial effusion.  MEDIASTINUM AND LUIS ALBERTO: No lymphadenopathy.  CHEST WALL AND LOWER NECK: Within normal limits.    ABDOMEN AND PELVIS:  LIVER: Within normal limits.  BILE DUCTS: Normal caliber.  GALLBLADDER: Within normal limits.  SPLEEN: No evidence of splenic injury or concerning finding. Small cystic   lesion upper spleen.  PANCREAS: Within normal limits.  ADRENALS: Within normal limits.  KIDNEYS/URETERS: No evidence of renal injury. No hydronephrosis or renal   stone or concerning mass. Small cysts bilaterally.    BLADDER: Within normal limits.  REPRODUCTIVE ORGANS: Radiation seeds in the prostate.    BOWEL: No bowel obstruction. Appendix is not visualized. No evidence of   inflammation in the pericecal region.  PERITONEUM: No ascites.  VESSELS: No abdominal aortic aneurysm. Calcific atherosclerosis   particularly of the distal abdominal aorta and iliac arteries.  RETROPERITONEUM/LYMPH NODES: No lymphadenopathy.  ABDOMINAL WALL: Large right lower quadrant anterior abdominal wall hernia   extending into the right inguinal region, not fully visible, contains the   cecum, much of the ascending colon, anddistal ileum. No evidence of   obstruction or other acute complication regarding this hernia.  BONES: Several thoracic and lumbar vertebral compression fractures but   with no discrete fracture line or adjacent edema. Mild depression of the   superior endplate of T1. Chronic moderate to severe compression T4   vertebral body. Mild compression T5, T6, T7, T8, T9, T10, T11 and T12   vertebral bodies. Chronic moderate compression of T3 vertebral body with   focal right scoliosis centered at this level. Status post posterior   fusion T2-sacroiliac. The hardware is intact. Bony demineralization.    IMPRESSION:  CT head: No evidence of intracranial injury or skull fracture.    CT cervical spine: No fracture or dislocation of the cervical spine.    CT Chest abdomen pelvis:  No evidence of acute traumatic injury.  Multilevel thoracic and lumbar vertebral compression fractures are   difficult to definitively age though there are no visible fracture lines   or adjacent edema to suggest acute injury. Comparison with priors would   be helpful.  Dependent densities in the lower lungs most likely atelectasis given the   low lung volumes. Superimposed pneumonia less likely.    --- End of Report ---    ALEX MACIEL MD; Attending Radiologist  This document has been electronically signed. May 11 2023  5:55AM    ORT Score -   Family Hx of substance abuse	Female	      Male  Alcohol 	                                           1                     3  Illegal drugs	                                   2                     3  Rx drugs                                           4 	                  4  Personal Hx of substance abuse		  Alcohol 	                                          3	                  3  Illegal drugs                                     4	                  4  Rx drugs                                            5 	                  5  Age between 16- 45 years	           1                     1  hx preadolescent sexual abuse	   3 	                  0  Psychological disease		  ADD, OCD, bipolar, schizophrenia   2	          2  Depression                                           1 	          1  Total: 0    a score of 3 or lower indicates low risk for opioid abuse		  a score of 4-7 indicates moderate risk for opioid abuse		  a score of 8 or higher indicates high risk for opioid abuse    REVIEW OF SYSTEMS:  CONSTITUTIONAL: No fever + asleep + [place]  RESPIRATORY: No cough, wheezing, chills or hemoptysis; No shortness of breath  CARDIOVASCULAR: No chest pain, palpitations, dizziness, or leg swelling  GASTROINTESTINAL: No loss of appetite, decreased PO intake. No nausea, vomiting; No diarrhea or constipation.   GENITOURINARY: No dysuria, frequency, hematuria, or incontinence +urinary retention, +penile pain  MUSCULOSKELETAL: + chronic back pain. No joint swelling; + generalized motor strength weakness, no saddle anesthesia, bowel/bladder incontinence, +falls   NEURO: No headaches, No numbness/tingling b/l LE    PHYSICAL EXAM:  GENERAL: + sleeping, but easy to wake up, Alert & Oriented x3, cooperative, NAD, Speech is clear. + [place]   RESPIRATORY: Respirations even and unlabored. Diminished to auscultation bilaterally; No rales, rhonchi, wheezing, or rubs + O2 2L NC.   CARDIOVASCULAR: Normal S1/S2, regular rate and rhythm; No murmurs, rubs, or gallops. No JVD.   GENITOURINARY: Urinary catheter with CBI in progress, draining appropriately.   GASTROINTESTINAL:  Soft, Nontender, Nondistended; Bowel sounds present  PERIPHERAL VASCULAR:  Extremities warm without edema. 2+ Peripheral Pulses, No cyanosis, No calf tenderness  MUSCULOSKELETAL: Motor Strength 4/5 B/L upper and 4/5 lower extremities; moves all extremities equally against gravity; ROM intact; negative SLR; + thoracic and lumbar back tenderness on palpation   SKIN: Warm, dry, intact. No rashes, lesions, scars or wounds. + lumbar and thoracic back lidoderm patches in place + hyperpigmentation b/l lower extremities + multiple scabs over back.    Risk factors associated with adverse outcomes related to opioid treatment  [ ]  Concurrent benzodiazepine use  [ ]  History/ Active substance use or alcohol use disorder  [ ] Psychiatric co-morbidity  [ ] Sleep apnea  [ ] COPD  [ ] BMI> 35  [ ] Liver dysfunction  [ ] Renal dysfunction  [ ] CHF  [x] Former Smoker  [x]  Age > 60 years    [x]  [place]  Reviewed and Copied to Chart. See below.    Plan of care and goal oriented pain management treatment options were discussed with patient and /or primary care giver; all questions and concerns were addressed and care was aligned with patient's wishes.    Educated patient on goal oriented pain management treatment options     05-22-23 @ 13:31     no

## 2023-05-31 ENCOUNTER — OUTPATIENT (OUTPATIENT)
Dept: OUTPATIENT SERVICES | Facility: HOSPITAL | Age: 78
LOS: 1 days | End: 2023-05-31
Payer: COMMERCIAL

## 2023-05-31 ENCOUNTER — APPOINTMENT (OUTPATIENT)
Dept: MRI IMAGING | Facility: CLINIC | Age: 78
End: 2023-05-31
Payer: COMMERCIAL

## 2023-05-31 DIAGNOSIS — Z98.890 OTHER SPECIFIED POSTPROCEDURAL STATES: Chronic | ICD-10-CM

## 2023-05-31 DIAGNOSIS — S72.002D FRACTURE OF UNSPECIFIED PART OF NECK OF LEFT FEMUR, SUBSEQUENT ENCOUNTER FOR CLOSED FRACTURE WITH ROUTINE HEALING: ICD-10-CM

## 2023-05-31 PROCEDURE — 73721 MRI JNT OF LWR EXTRE W/O DYE: CPT | Mod: 26,LT

## 2023-05-31 PROCEDURE — 73721 MRI JNT OF LWR EXTRE W/O DYE: CPT

## 2023-06-05 ENCOUNTER — NON-APPOINTMENT (OUTPATIENT)
Age: 78
End: 2023-06-05

## 2023-06-07 ENCOUNTER — RX RENEWAL (OUTPATIENT)
Age: 78
End: 2023-06-07

## 2023-06-13 ENCOUNTER — APPOINTMENT (OUTPATIENT)
Dept: SURGERY | Facility: CLINIC | Age: 78
End: 2023-06-13
Payer: COMMERCIAL

## 2023-06-13 PROCEDURE — 99213 OFFICE O/P EST LOW 20 MIN: CPT | Mod: 25

## 2023-06-13 PROCEDURE — 36415 COLL VENOUS BLD VENIPUNCTURE: CPT

## 2023-06-13 NOTE — PHYSICAL EXAM
[de-identified] : well healed scar [Midline] : located in midline position [Normal] : orientation to person, place, and time: normal [de-identified] : Neg Chvostek's sign

## 2023-06-13 NOTE — HISTORY OF PRESENT ILLNESS
[de-identified] : Pt 3 months s/p parathyroidectomy doing well without complaints all reports reviewed recent Ca 9

## 2023-06-14 ENCOUNTER — NON-APPOINTMENT (OUTPATIENT)
Age: 78
End: 2023-06-14

## 2023-06-14 ENCOUNTER — APPOINTMENT (OUTPATIENT)
Dept: ENDOCRINOLOGY | Facility: CLINIC | Age: 78
End: 2023-06-14

## 2023-06-14 LAB
25(OH)D3 SERPL-MCNC: 46.8 NG/ML
CALCIUM SERPL-MCNC: 9.4 MG/DL
CALCIUM SERPL-MCNC: 9.4 MG/DL
PARATHYROID HORMONE INTACT: 110 PG/ML

## 2023-06-16 ENCOUNTER — NON-APPOINTMENT (OUTPATIENT)
Age: 78
End: 2023-06-16

## 2023-06-20 ENCOUNTER — APPOINTMENT (OUTPATIENT)
Dept: NEPHROLOGY | Facility: CLINIC | Age: 78
End: 2023-06-20
Payer: COMMERCIAL

## 2023-06-20 VITALS
HEIGHT: 69 IN | WEIGHT: 165 LBS | BODY MASS INDEX: 24.44 KG/M2 | SYSTOLIC BLOOD PRESSURE: 112 MMHG | DIASTOLIC BLOOD PRESSURE: 78 MMHG | TEMPERATURE: 97.5 F

## 2023-06-20 PROCEDURE — 99214 OFFICE O/P EST MOD 30 MIN: CPT

## 2023-06-20 RX ORDER — SODIUM PICOSULFATE, MAGNESIUM OXIDE, AND ANHYDROUS CITRIC ACID 10; 3.5; 12 MG/160ML; G/160ML; G/160ML
10-3.5-12 MG-GM LIQUID ORAL
Qty: 1 | Refills: 0 | Status: DISCONTINUED | COMMUNITY
Start: 2022-03-24 | End: 2023-06-20

## 2023-06-20 RX ORDER — NEBIVOLOL 5 MG/1
5 TABLET ORAL
Refills: 0 | Status: ACTIVE | COMMUNITY

## 2023-06-20 RX ORDER — CALCITRIOL 0.25 UG/1
0.25 CAPSULE, LIQUID FILLED ORAL DAILY
Refills: 0 | Status: DISCONTINUED | COMMUNITY
End: 2023-06-20

## 2023-06-20 RX ORDER — ONDANSETRON 4 MG/1
4 TABLET, ORALLY DISINTEGRATING ORAL
Qty: 90 | Refills: 2 | Status: DISCONTINUED | COMMUNITY
Start: 2023-01-12 | End: 2023-06-20

## 2023-06-20 RX ORDER — TERAZOSIN 2 MG/1
2 CAPSULE ORAL
Refills: 0 | Status: DISCONTINUED | COMMUNITY
End: 2023-06-20

## 2023-06-20 RX ORDER — UBIDECARENONE 30 MG
CAPSULE ORAL
Refills: 0 | Status: ACTIVE | COMMUNITY

## 2023-06-20 RX ORDER — OMEPRAZOLE 20 MG/1
20 CAPSULE, DELAYED RELEASE ORAL DAILY
Refills: 0 | Status: DISCONTINUED | COMMUNITY
End: 2023-06-20

## 2023-06-20 RX ORDER — ASPIRIN 81 MG
81 TABLET, DELAYED RELEASE (ENTERIC COATED) ORAL
Refills: 0 | Status: DISCONTINUED | COMMUNITY
End: 2023-06-20

## 2023-06-20 RX ORDER — CINACALCET HYDROCHLORIDE 90 MG/1
TABLET, COATED ORAL
Refills: 0 | Status: DISCONTINUED | COMMUNITY
End: 2023-06-20

## 2023-06-20 RX ORDER — AMLODIPINE BESYLATE 10 MG/1
10 TABLET ORAL DAILY
Refills: 0 | Status: DISCONTINUED | COMMUNITY
End: 2023-06-20

## 2023-06-20 NOTE — ASSESSMENT
[FreeTextEntry1] : # CKD Stage III with variability in the past.\par # Recent diagnosis of primary hyperparathyroidism status post parathyroidectomy by Dr. Lundy.\par # Hypertension.\par # Hyperlipidemia.\par # GERD on PPI followed by Dr. Mendoza. No Reynolds's esophagitis.  Had a recent EGD with CT with IV contrast with indeterminate gastric lesions\par # Prostate CA with one biopsy, only monitoring.  Followed by Dr. Arellano and Dr. Givens.\par # OCD.\par # Hypothyroidism.\par # Renal cyst.\par # BPH with mild PVR.\par # Recent left femur fracture after a fall with rods placed..\par # Left lower extremity calf pain and swelling.\par \par REC\par - cw current htn regimen \par - pt off diuretics, \par - cw PPI\par - on calcitriol. fu trend of pth \par - 6 month fu

## 2023-06-20 NOTE — HISTORY OF PRESENT ILLNESS
[___ Month(s) Ago] : [unfilled] month(s) ago [Stage 3] : stage 3 [Hypertensive Nephropathy] : hypertensive nephropathy [None] : ~he/she~ is currently asymptomatic [ACE Inhibitor] : ACE inhibitor [Dyslipidemia Medications] : dyslipidemia medications [Good Compliance] : good compliance  [Good Tolerance] : good tolerance  [Good Symptom Control] : good symptom control [FreeTextEntry1] : still with left hip pain, pt on going \par no nsaid use reluctant to use tylenol due to ckd \par + po fluid intake

## 2023-06-20 NOTE — PHYSICAL EXAM
[General Appearance - Alert] : alert [General Appearance - In No Acute Distress] : in no acute distress [Sclera] : the sclera and conjunctiva were normal [Neck Appearance] : the appearance of the neck was normal [] : no respiratory distress [Respiration, Rhythm And Depth] : normal respiratory rhythm and effort [Auscultation Breath Sounds / Voice Sounds] : lungs were clear to auscultation bilaterally [Heart Rate And Rhythm] : heart rate was normal and rhythm regular [Heart Sounds] : normal S1 and S2 [Murmurs] : no murmurs [No CVA Tenderness] : no ~M costovertebral angle tenderness

## 2023-08-08 ENCOUNTER — APPOINTMENT (OUTPATIENT)
Dept: ORTHOPEDIC SURGERY | Facility: CLINIC | Age: 78
End: 2023-08-08
Payer: COMMERCIAL

## 2023-08-08 VITALS — BODY MASS INDEX: 25.33 KG/M2 | WEIGHT: 171 LBS | HEIGHT: 69 IN

## 2023-08-08 PROCEDURE — 99213 OFFICE O/P EST LOW 20 MIN: CPT

## 2023-08-08 PROCEDURE — 73502 X-RAY EXAM HIP UNI 2-3 VIEWS: CPT

## 2023-08-14 NOTE — OCCUPATIONAL THERAPY INITIAL EVALUATION ADULT - BATHING, PREVIOUS LEVEL OF FUNCTION, OT EVAL
Chief Complaint  Hypertension    Subjective        Guillermina Churchill presents to Regency independent

## 2023-09-14 NOTE — DIETITIAN INITIAL EVALUATION ADULT - ETIOLOGY
related to increased demands in setting of wound healing needs Tube feed regimen was ordered yesterday (Peptamen AF goal rate 20 mL/hr), however currently not receiving this tube feed regimen at this time d/t plan for possible extubation today.

## 2023-10-03 ENCOUNTER — APPOINTMENT (OUTPATIENT)
Dept: ORTHOPEDIC SURGERY | Facility: CLINIC | Age: 78
End: 2023-10-03
Payer: COMMERCIAL

## 2023-10-03 VITALS — BODY MASS INDEX: 25.33 KG/M2 | WEIGHT: 171 LBS | HEIGHT: 69 IN

## 2023-10-03 PROCEDURE — 99214 OFFICE O/P EST MOD 30 MIN: CPT

## 2023-10-03 PROCEDURE — 73502 X-RAY EXAM HIP UNI 2-3 VIEWS: CPT

## 2023-10-18 ENCOUNTER — RX RENEWAL (OUTPATIENT)
Age: 78
End: 2023-10-18

## 2023-10-23 ENCOUNTER — RX CHANGE (OUTPATIENT)
Age: 78
End: 2023-10-23

## 2023-10-23 RX ORDER — SUCRALFATE 1 G/1
1 TABLET ORAL
Qty: 120 | Refills: 0 | Status: DISCONTINUED | COMMUNITY
Start: 2023-01-10 | End: 2023-10-23

## 2023-10-23 NOTE — ASU PREOP CHECKLIST - COMMENTS
pt took norvasc, paroxetine, methimazole and omperzole at 6:15 am with a sip Hydroxyzine Pregnancy And Lactation Text: This medication is not safe during pregnancy and should not be taken. It is also excreted in breast milk and breast feeding isn't recommended.

## 2023-12-12 ENCOUNTER — APPOINTMENT (OUTPATIENT)
Dept: SURGERY | Facility: CLINIC | Age: 78
End: 2023-12-12
Payer: COMMERCIAL

## 2023-12-12 PROCEDURE — 99213 OFFICE O/P EST LOW 20 MIN: CPT

## 2023-12-15 NOTE — ASU PATIENT PROFILE, ADULT - FALL HARM RISK - HARM RISK INTERVENTIONS
[Normal Appearance] : normal appearance [Well Groomed] : well groomed [General Appearance - In No Acute Distress] : no acute distress [Normal Station and Gait] : the gait and station were normal for the patient's age [] : no rash [No Focal Deficits] : no focal deficits [Oriented To Time, Place, And Person] : oriented to person, place, and time [Affect] : the affect was normal [Mood] : the mood was normal Assistance OOB with selected safe patient handling equipment/Communicate Risk of Fall with Harm to all staff/Discuss with provider need for PT consult/Monitor gait and stability/Provide patient with walking aids - walker, cane, crutches/Reinforce activity limits and safety measures with patient and family/Tailored Fall Risk Interventions/Visual Cue: Yellow wristband and red socks/Bed in lowest position, wheels locked, appropriate side rails in place/Call bell, personal items and telephone in reach/Instruct patient to call for assistance before getting out of bed or chair/Non-slip footwear when patient is out of bed/[place] to call system/Physically safe environment - no spills, clutter or unnecessary equipment/Purposeful Proactive Rounding/Room/bathroom lighting operational, light cord in reach

## 2023-12-20 ENCOUNTER — APPOINTMENT (OUTPATIENT)
Dept: NEPHROLOGY | Facility: CLINIC | Age: 78
End: 2023-12-20
Payer: COMMERCIAL

## 2023-12-20 VITALS
DIASTOLIC BLOOD PRESSURE: 60 MMHG | TEMPERATURE: 97.8 F | SYSTOLIC BLOOD PRESSURE: 118 MMHG | BODY MASS INDEX: 26.96 KG/M2 | OXYGEN SATURATION: 98 % | HEIGHT: 69 IN | WEIGHT: 182 LBS | HEART RATE: 80 BPM

## 2023-12-20 LAB
ANION GAP SERPL CALC-SCNC: 13 MMOL/L
APPEARANCE: CLEAR
BACTERIA: NEGATIVE /HPF
BILIRUBIN URINE: NEGATIVE
BLOOD URINE: NEGATIVE
BUN SERPL-MCNC: 32 MG/DL
CALCIUM SERPL-MCNC: 9.3 MG/DL
CALCIUM SERPL-MCNC: 9.3 MG/DL
CAST: 1 /LPF
CHLORIDE SERPL-SCNC: 107 MMOL/L
CO2 SERPL-SCNC: 22 MMOL/L
COLOR: YELLOW
CREAT SERPL-MCNC: 1.62 MG/DL
CREAT SPEC-SCNC: 129 MG/DL
CREAT/PROT UR: 0.1 RATIO
EGFR: 44 ML/MIN/1.73M2
EPITHELIAL CELLS: 1 /HPF
GLUCOSE QUALITATIVE U: NEGATIVE MG/DL
GLUCOSE SERPL-MCNC: 115 MG/DL
HCT VFR BLD CALC: 37.5 %
HGB BLD-MCNC: 12.1 G/DL
KETONES URINE: NEGATIVE MG/DL
LEUKOCYTE ESTERASE URINE: NEGATIVE
MICROSCOPIC-UA: NORMAL
NITRITE URINE: NEGATIVE
PARATHYROID HORMONE INTACT: 98 PG/ML
PH URINE: 5.5
POTASSIUM SERPL-SCNC: 3.7 MMOL/L
PROT UR-MCNC: 10 MG/DL
PROTEIN URINE: NEGATIVE MG/DL
RED BLOOD CELLS URINE: 1 /HPF
SODIUM SERPL-SCNC: 142 MMOL/L
SPECIFIC GRAVITY URINE: 1.02
UROBILINOGEN URINE: 0.2 MG/DL
WHITE BLOOD CELLS URINE: 1 /HPF

## 2023-12-20 PROCEDURE — 99214 OFFICE O/P EST MOD 30 MIN: CPT

## 2023-12-20 RX ORDER — SUCRALFATE 1 G/1
1 TABLET ORAL
Qty: 120 | Refills: 0 | Status: DISCONTINUED | COMMUNITY
Start: 2023-10-18 | End: 2023-12-20

## 2023-12-20 RX ORDER — SUCRALFATE 1 G/1
1 TABLET ORAL
Qty: 360 | Refills: 1 | Status: DISCONTINUED | COMMUNITY
End: 2023-12-20

## 2023-12-20 RX ORDER — CALCITRIOL 0.5 UG/1
0.5 CAPSULE, LIQUID FILLED ORAL
Refills: 0 | Status: DISCONTINUED | COMMUNITY
End: 2023-12-20

## 2023-12-20 RX ORDER — HYDRALAZINE HYDROCHLORIDE 25 MG/1
25 TABLET ORAL TWICE DAILY
Qty: 60 | Refills: 3 | Status: ACTIVE | COMMUNITY
Start: 2023-12-20

## 2023-12-20 RX ORDER — HYDROCHLOROTHIAZIDE 50 MG/1
50 TABLET ORAL DAILY
Qty: 90 | Refills: 0 | Status: ACTIVE | COMMUNITY
Start: 2023-12-20

## 2023-12-20 NOTE — ASSESSMENT
[FreeTextEntry1] : -------------------------------------------- # CKD Stage III with variability in the past. # HTN nephrosclerosis  - stable  - hctz 50 qd - hydralazine 25 mg bid  - nebicolol 5 qd - pravastatin 20 qd  # Recent diagnosis of primary hyperparathyroidism status post parathyroidectomy by Dr. Lundy. - hx of SHPT in past  - off calictrol  - fu trend of ca and pth  # GERD  - on PPI followed by Dr. Mahajan. No Reynolds's esophagitis. -  Had a recent EGD with CT with IV contrast with indeterminate gastric lesions  # Prostate CA with one biopsy, only monitoring. Followed by Dr. Arellano and Dr. Givens. # OCD. # Hypothyroidism. # Renal cyst. # BPH with mild PVR. # left femur fracture after a fall with rods placed.. # Left lower extremity calf pain and swelling.

## 2023-12-20 NOTE — HISTORY OF PRESENT ILLNESS
[___ Month(s) Ago] : [unfilled] month(s) ago [Stage 3] : stage 3 [Hypertensive Nephropathy] : hypertensive nephropathy [None] : ~he/she~ is currently asymptomatic [ACE Inhibitor] : ACE inhibitor [Dyslipidemia Medications] : dyslipidemia medications [Good Compliance] : good compliance  [Good Tolerance] : good tolerance  [Good Symptom Control] : good symptom control [FreeTextEntry1] : rt hip pain tylenol prn used started hydralazine states taking diuretics

## 2024-03-21 ENCOUNTER — APPOINTMENT (OUTPATIENT)
Dept: GASTROENTEROLOGY | Facility: CLINIC | Age: 79
End: 2024-03-21
Payer: COMMERCIAL

## 2024-03-21 VITALS
SYSTOLIC BLOOD PRESSURE: 120 MMHG | BODY MASS INDEX: 25.48 KG/M2 | DIASTOLIC BLOOD PRESSURE: 74 MMHG | WEIGHT: 172 LBS | HEIGHT: 69 IN

## 2024-03-21 DIAGNOSIS — R14.2 ERUCTATION: ICD-10-CM

## 2024-03-21 PROCEDURE — 99214 OFFICE O/P EST MOD 30 MIN: CPT

## 2024-03-21 NOTE — PHYSICAL EXAM
[Alert] : alert [Normal Voice/Communication] : normal voice/communication [Healthy Appearing] : healthy appearing [No Acute Distress] : no acute distress [Sclera] : the sclera and conjunctiva were normal [Hearing Threshold Finger Rub Not [place]] : hearing was normal [Normal Lips/Gums] : the lips and gums were normal [Oropharynx] : the oropharynx was normal [Normal Appearance] : the appearance of the neck was normal [No Neck Mass] : no neck mass was observed [No Respiratory Distress] : no respiratory distress [No Acc Muscle Use] : no accessory muscle use [Respiration, Rhythm And Depth] : normal respiratory rhythm and effort [Auscultation Breath Sounds / Voice Sounds] : lungs were clear to auscultation bilaterally [Heart Rate And Rhythm] : heart rate was normal and rhythm regular [Normal S1, S2] : normal S1 and S2 [Murmurs] : no murmurs [Bowel Sounds] : normal bowel sounds [No Masses] : no abdominal mass palpated [Abdomen Tenderness] : non-tender [] : no hepatosplenomegaly [Abdomen Soft] : soft [Oriented To Time, Place, And Person] : oriented to person, place, and time

## 2024-03-21 NOTE — ASSESSMENT
[FreeTextEntry1] : 77yo male with GERD, dyspepsia  will change to pantropazole add pepcid complete when symptomatic consider repeat egd/imaging pending results above

## 2024-03-21 NOTE — HISTORY OF PRESENT ILLNESS
[FreeTextEntry1] : 77yo male with intermittent LUQ pain  He also has some increased belching, dyspepsia and rare regurgitation Last egd 2 years ago he has hx gastritis and maintained on omeprazole

## 2024-04-01 ENCOUNTER — RX RENEWAL (OUTPATIENT)
Age: 79
End: 2024-04-01

## 2024-04-01 RX ORDER — OMEPRAZOLE 20 MG/1
20 CAPSULE, DELAYED RELEASE ORAL
Qty: 180 | Refills: 2 | Status: ACTIVE | COMMUNITY
Start: 2019-09-16 | End: 1900-01-01

## 2024-04-10 ENCOUNTER — APPOINTMENT (OUTPATIENT)
Dept: ORTHOPEDIC SURGERY | Facility: CLINIC | Age: 79
End: 2024-04-10
Payer: COMMERCIAL

## 2024-04-10 VITALS — WEIGHT: 170 LBS | BODY MASS INDEX: 25.18 KG/M2 | HEIGHT: 69 IN

## 2024-04-10 DIAGNOSIS — M16.12 UNILATERAL PRIMARY OSTEOARTHRITIS, LEFT HIP: ICD-10-CM

## 2024-04-10 DIAGNOSIS — S72.002D FRACTURE OF UNSPECIFIED PART OF NECK OF LEFT FEMUR, SUBSEQUENT ENCOUNTER FOR CLOSED FRACTURE WITH ROUTINE HEALING: ICD-10-CM

## 2024-04-10 DIAGNOSIS — M16.11 UNILATERAL PRIMARY OSTEOARTHRITIS, RIGHT HIP: ICD-10-CM

## 2024-04-10 DIAGNOSIS — M70.62 TROCHANTERIC BURSITIS, LEFT HIP: ICD-10-CM

## 2024-04-10 PROCEDURE — 99214 OFFICE O/P EST MOD 30 MIN: CPT | Mod: 25

## 2024-04-10 PROCEDURE — 20610 DRAIN/INJ JOINT/BURSA W/O US: CPT | Mod: LT

## 2024-04-10 PROCEDURE — 73502 X-RAY EXAM HIP UNI 2-3 VIEWS: CPT

## 2024-04-12 ENCOUNTER — RX CHANGE (OUTPATIENT)
Age: 79
End: 2024-04-12

## 2024-04-12 RX ORDER — PANTOPRAZOLE 40 MG/1
40 TABLET, DELAYED RELEASE ORAL
Qty: 30 | Refills: 2 | Status: DISCONTINUED | COMMUNITY
Start: 2024-03-21 | End: 2024-04-12

## 2024-05-06 NOTE — ED ADULT NURSE NOTE - SUICIDE SCREENING QUESTION 2
Patient ran out of xanax last night , but md had a note on the script that states don't refill till 5/20. Last dose approx noon yesterday.pt endorses general body aches since nov that is mildly relieved with xanax. Pt concerned she is going through withdrawal    No

## 2024-05-16 ENCOUNTER — APPOINTMENT (OUTPATIENT)
Dept: GASTROENTEROLOGY | Facility: CLINIC | Age: 79
End: 2024-05-16
Payer: COMMERCIAL

## 2024-05-16 ENCOUNTER — RESULT REVIEW (OUTPATIENT)
Age: 79
End: 2024-05-16

## 2024-05-16 VITALS
BODY MASS INDEX: 26.07 KG/M2 | DIASTOLIC BLOOD PRESSURE: 74 MMHG | WEIGHT: 176 LBS | SYSTOLIC BLOOD PRESSURE: 140 MMHG | HEIGHT: 69 IN

## 2024-05-16 DIAGNOSIS — R10.12 LEFT UPPER QUADRANT PAIN: ICD-10-CM

## 2024-05-16 PROCEDURE — 99213 OFFICE O/P EST LOW 20 MIN: CPT

## 2024-05-16 NOTE — ASSESSMENT
[FreeTextEntry1] : 79yo male with LUQ discomfort  Will check labs and ct scan if negative, would consider repeat EGD r/o PUD

## 2024-05-16 NOTE — PHYSICAL EXAM

## 2024-05-16 NOTE — HISTORY OF PRESENT ILLNESS
[FreeTextEntry1] : 77yo male with intermittent LUQ pain  He also has some increased belching, dyspepsia and rare regurgitation Last egd 2 years ago  He is still having pain despite PPI and added pepcid Eating may make it worse at times Belching and GERD improved but LUQ discomfort persists

## 2024-05-17 ENCOUNTER — APPOINTMENT (OUTPATIENT)
Dept: CT IMAGING | Facility: HOSPITAL | Age: 79
End: 2024-05-17
Payer: COMMERCIAL

## 2024-05-17 ENCOUNTER — OUTPATIENT (OUTPATIENT)
Dept: OUTPATIENT SERVICES | Facility: HOSPITAL | Age: 79
LOS: 1 days | End: 2024-05-17
Payer: COMMERCIAL

## 2024-05-17 DIAGNOSIS — R10.12 LEFT UPPER QUADRANT PAIN: ICD-10-CM

## 2024-05-17 DIAGNOSIS — Z98.890 OTHER SPECIFIED POSTPROCEDURAL STATES: Chronic | ICD-10-CM

## 2024-05-17 PROCEDURE — 74177 CT ABD & PELVIS W/CONTRAST: CPT

## 2024-05-17 PROCEDURE — 74177 CT ABD & PELVIS W/CONTRAST: CPT | Mod: 26

## 2024-05-21 LAB
ALBUMIN SERPL ELPH-MCNC: 4.1 G/DL
ALP BLD-CCNC: 74 U/L
ALT SERPL-CCNC: 20 U/L
AMYLASE/CREAT SERPL: 62 U/L
ANION GAP SERPL CALC-SCNC: 15 MMOL/L
AST SERPL-CCNC: 23 U/L
BILIRUB SERPL-MCNC: 0.3 MG/DL
BUN SERPL-MCNC: 26 MG/DL
CALCIUM SERPL-MCNC: 9.1 MG/DL
CHLORIDE SERPL-SCNC: 104 MMOL/L
CO2 SERPL-SCNC: 22 MMOL/L
CREAT SERPL-MCNC: 1.63 MG/DL
CRP SERPL-MCNC: 4 MG/L
EGFR: 43 ML/MIN/1.73M2
ERYTHROCYTE [SEDIMENTATION RATE] IN BLOOD BY WESTERGREN METHOD: 24 MM/HR
GLUCOSE SERPL-MCNC: 153 MG/DL
HCT VFR BLD CALC: 38.8 %
HGB BLD-MCNC: 12.4 G/DL
LPL SERPL-CCNC: 25 U/L
MCHC RBC-ENTMCNC: 27.3 PG
MCHC RBC-ENTMCNC: 32 GM/DL
MCV RBC AUTO: 85.5 FL
PLATELET # BLD AUTO: 207 K/UL
POTASSIUM SERPL-SCNC: 3.6 MMOL/L
PROT SERPL-MCNC: 6.5 G/DL
RBC # BLD: 4.54 M/UL
RBC # FLD: 15.3 %
SODIUM SERPL-SCNC: 140 MMOL/L
WBC # FLD AUTO: 7.97 K/UL

## 2024-06-13 ENCOUNTER — APPOINTMENT (OUTPATIENT)
Dept: SURGERY | Facility: CLINIC | Age: 79
End: 2024-06-13
Payer: COMMERCIAL

## 2024-06-13 PROCEDURE — 99213 OFFICE O/P EST LOW 20 MIN: CPT

## 2024-06-13 NOTE — PHYSICAL EXAM
[de-identified] : well healed scar [Midline] : located in midline position [Normal] : orientation to person, place, and time: normal [de-identified] : Neg Chvostek's sign

## 2024-06-14 ENCOUNTER — NON-APPOINTMENT (OUTPATIENT)
Age: 79
End: 2024-06-14

## 2024-06-14 LAB
25(OH)D3 SERPL-MCNC: 46.7 NG/ML
CALCIUM SERPL-MCNC: 9 MG/DL
CALCIUM SERPL-MCNC: 9 MG/DL
PARATHYROID HORMONE INTACT: 142 PG/ML

## 2024-06-19 ENCOUNTER — APPOINTMENT (OUTPATIENT)
Dept: NEPHROLOGY | Facility: CLINIC | Age: 79
End: 2024-06-19
Payer: COMMERCIAL

## 2024-06-19 VITALS
DIASTOLIC BLOOD PRESSURE: 62 MMHG | HEIGHT: 69 IN | OXYGEN SATURATION: 98 % | SYSTOLIC BLOOD PRESSURE: 118 MMHG | TEMPERATURE: 95.4 F | HEART RATE: 76 BPM | WEIGHT: 178 LBS | BODY MASS INDEX: 26.36 KG/M2

## 2024-06-19 DIAGNOSIS — K21.9 GASTRO-ESOPHAGEAL REFLUX DISEASE W/OUT ESOPHAGITIS: ICD-10-CM

## 2024-06-19 DIAGNOSIS — E21.3 HYPERPARATHYROIDISM, UNSPECIFIED: ICD-10-CM

## 2024-06-19 DIAGNOSIS — I10 ESSENTIAL (PRIMARY) HYPERTENSION: ICD-10-CM

## 2024-06-19 DIAGNOSIS — N18.30 CHRONIC KIDNEY DISEASE, STAGE 3 UNSPECIFIED: ICD-10-CM

## 2024-06-19 LAB
ALBUMIN SERPL ELPH-MCNC: 4.2 G/DL
ANION GAP SERPL CALC-SCNC: 14 MMOL/L
APPEARANCE: CLEAR
BACTERIA: NEGATIVE /HPF
BILIRUBIN URINE: NEGATIVE
BLOOD URINE: NEGATIVE
BUN SERPL-MCNC: 28 MG/DL
CALCIUM SERPL-MCNC: 9.2 MG/DL
CALCIUM SERPL-MCNC: 9.2 MG/DL
CAST: 1 /LPF
CHLORIDE SERPL-SCNC: 105 MMOL/L
CO2 SERPL-SCNC: 22 MMOL/L
COLOR: YELLOW
CREAT SERPL-MCNC: 1.67 MG/DL
CREAT SPEC-SCNC: 114 MG/DL
CREAT/PROT UR: 0.1 RATIO
EGFR: 42 ML/MIN/1.73M2
EPITHELIAL CELLS: 1 /HPF
GLUCOSE QUALITATIVE U: NEGATIVE MG/DL
GLUCOSE SERPL-MCNC: 100 MG/DL
HCT VFR BLD CALC: 38.8 %
HGB BLD-MCNC: 12.2 G/DL
KETONES URINE: NEGATIVE MG/DL
LEUKOCYTE ESTERASE URINE: NEGATIVE
MICROSCOPIC-UA: NORMAL
NITRITE URINE: NEGATIVE
PARATHYROID HORMONE INTACT: 134 PG/ML
PH URINE: 6
PHOSPHATE SERPL-MCNC: 2.5 MG/DL
POTASSIUM SERPL-SCNC: 3.7 MMOL/L
PROT UR-MCNC: 10 MG/DL
PROTEIN URINE: NEGATIVE MG/DL
RED BLOOD CELLS URINE: 0 /HPF
SODIUM SERPL-SCNC: 141 MMOL/L
SPECIFIC GRAVITY URINE: 1.01
UROBILINOGEN URINE: 0.2 MG/DL
WHITE BLOOD CELLS URINE: 1 /HPF

## 2024-06-19 PROCEDURE — 99214 OFFICE O/P EST MOD 30 MIN: CPT

## 2024-06-19 RX ORDER — PANTOPRAZOLE 40 MG/1
40 TABLET, DELAYED RELEASE ORAL
Qty: 90 | Refills: 1 | Status: DISCONTINUED | COMMUNITY
Start: 1900-01-01 | End: 2024-06-19

## 2024-06-19 RX ORDER — LYCOPENE 10 MG
10 CAPSULE ORAL DAILY
Refills: 0 | Status: DISCONTINUED | COMMUNITY
End: 2024-06-19

## 2024-06-19 NOTE — PHYSICAL EXAM
[General Appearance - Alert] : alert [General Appearance - In No Acute Distress] : in no acute distress [Sclera] : the sclera and conjunctiva were normal [Neck Appearance] : the appearance of the neck was normal [] : no respiratory distress [Respiration, Rhythm And Depth] : normal respiratory rhythm and effort [Auscultation Breath Sounds / Voice Sounds] : lungs were clear to auscultation bilaterally [Heart Rate And Rhythm] : heart rate was normal and rhythm regular [Heart Sounds] : normal S1 and S2 [Murmurs] : no murmurs [Abdomen Soft] : soft [Abdomen Tenderness] : non-tender [No CVA Tenderness] : no ~M costovertebral angle tenderness [Involuntary Movements] : no involuntary movements were seen [No Focal Deficits] : no focal deficits [Oriented To Time, Place, And Person] : oriented to person, place, and time

## 2024-06-19 NOTE — HISTORY OF PRESENT ILLNESS
[___ Month(s) Ago] : [unfilled] month(s) ago [Stage 3] : stage 3 [Hypertensive Nephropathy] : hypertensive nephropathy [None] : ~he/she~ is currently asymptomatic [ACE Inhibitor] : ACE inhibitor [Dyslipidemia Medications] : dyslipidemia medications [Good Compliance] : good compliance  [Good Tolerance] : good tolerance  [Good Symptom Control] : good symptom control [FreeTextEntry1] : rt hip pain tylenol prn used, has thr replacement in sept planned  bp stable on current regimen  started on couple of medication and will call in with names  has non incarcerating hernias on ct with ivc  no new changes with ENT

## 2024-06-19 NOTE — ASSESSMENT
[FreeTextEntry1] : -------------------------------------------- # CKD Stage III with variability in the past. # HTN nephrosclerosis  - stable  - hctz 50 qd - hydralazine 25 mg tid  - nebicolol 5 qd - pravastatin 20 qd - no nsaids  # Recent diagnosis of primary hyperparathyroidism status post parathyroidectomy by Dr. Lundy. - hx of SHPT in past  - off calcitriol  - fu trend of ca and pth  # GERD  - on PPI followed by Dr. Mahajan. No Reynolds's esophagitis. -  Had a recent EGD with CT with IV contrast with indeterminate gastric lesions  # Prostate CA with one biopsy, only monitoring. Followed by Dr. Arellano and Dr. Givens. # OCD. # Hypothyroidism. # Renal cyst. # BPH with mild PVR. # left femur fracture after a fall with rods placed.. # Left lower extremity calf pain and swelling.

## 2024-07-26 ENCOUNTER — APPOINTMENT (OUTPATIENT)
Dept: GASTROENTEROLOGY | Facility: CLINIC | Age: 79
End: 2024-07-26
Payer: COMMERCIAL

## 2024-07-26 VITALS
WEIGHT: 178 LBS | DIASTOLIC BLOOD PRESSURE: 74 MMHG | BODY MASS INDEX: 26.36 KG/M2 | SYSTOLIC BLOOD PRESSURE: 128 MMHG | HEIGHT: 69 IN

## 2024-07-26 DIAGNOSIS — Z09 ENCOUNTER FOR FOLLOW-UP EXAMINATION AFTER COMPLETED TREATMENT FOR CONDITIONS OTHER THAN MALIGNANT NEOPLASM: ICD-10-CM

## 2024-07-26 DIAGNOSIS — R10.9 UNSPECIFIED ABDOMINAL PAIN: ICD-10-CM

## 2024-07-26 PROCEDURE — 99213 OFFICE O/P EST LOW 20 MIN: CPT

## 2024-07-27 PROBLEM — R10.9 ABDOMINAL PAIN, LEFT LATERAL: Status: ACTIVE | Noted: 2024-07-27

## 2024-07-27 NOTE — PHYSICAL EXAM
[Alert] : alert [Normal Voice/Communication] : normal voice/communication [Healthy Appearing] : healthy appearing [No Acute Distress] : no acute distress [Sclera] : the sclera and conjunctiva were normal [Hearing Threshold Finger Rub Not [place]] : hearing was normal [Normal Lips/Gums] : the lips and gums were normal [Normal Appearance] : the appearance of the neck was normal [Oropharynx] : the oropharynx was normal [No Neck Mass] : no neck mass was observed [No Respiratory Distress] : no respiratory distress [No Acc Muscle Use] : no accessory muscle use [Respiration, Rhythm And Depth] : normal respiratory rhythm and effort [Auscultation Breath Sounds / Voice Sounds] : lungs were clear to auscultation bilaterally [Heart Rate And Rhythm] : heart rate was normal and rhythm regular [Normal S1, S2] : normal S1 and S2 [Murmurs] : no murmurs [Bowel Sounds] : normal bowel sounds [Abdomen Tenderness] : non-tender [No Masses] : no abdominal mass palpated [Abdomen Soft] : soft [Oriented To Time, Place, And Person] : oriented to person, place, and time [] : no hepatosplenomegaly

## 2024-07-27 NOTE — ASSESSMENT
[FreeTextEntry1] : 77yo male with left-sided abdominal pain  likely spasm pt to keep symptom diary with description of episodes  we discussed antispasmodic but episodes infrequent and relatively short-lasting

## 2024-07-27 NOTE — HISTORY OF PRESENT ILLNESS
[FreeTextEntry1] : 77yo male with left-sided abdominal pain  He has persistent intermittent left-sided pain Just left to umbilicus, where has has known small hernia which does not cause pain No clear exacerbating or mitigating factors.  We reviewed recent ct scan and negative recent egd

## 2024-07-27 NOTE — PHYSICAL EXAM
[Alert] : alert [Normal Voice/Communication] : normal voice/communication [No Acute Distress] : no acute distress [Healthy Appearing] : healthy appearing [Sclera] : the sclera and conjunctiva were normal [Hearing Threshold Finger Rub Not [place]] : hearing was normal [Normal Lips/Gums] : the lips and gums were normal [Oropharynx] : the oropharynx was normal [Normal Appearance] : the appearance of the neck was normal [No Neck Mass] : no neck mass was observed [No Respiratory Distress] : no respiratory distress [No Acc Muscle Use] : no accessory muscle use [Respiration, Rhythm And Depth] : normal respiratory rhythm and effort [Auscultation Breath Sounds / Voice Sounds] : lungs were clear to auscultation bilaterally [Heart Rate And Rhythm] : heart rate was normal and rhythm regular [Normal S1, S2] : normal S1 and S2 [Murmurs] : no murmurs [Bowel Sounds] : normal bowel sounds [No Masses] : no abdominal mass palpated [Abdomen Tenderness] : non-tender [Abdomen Soft] : soft [] : no hepatosplenomegaly [Oriented To Time, Place, And Person] : oriented to person, place, and time

## 2024-07-27 NOTE — HISTORY OF PRESENT ILLNESS
[FreeTextEntry1] : 79yo male with left-sided abdominal pain  He has persistent intermittent left-sided pain Just left to umbilicus, where has has known small hernia which does not cause pain No clear exacerbating or mitigating factors.  We reviewed recent ct scan and negative recent egd

## 2024-09-21 ENCOUNTER — APPOINTMENT (OUTPATIENT)
Dept: ORTHOPEDIC SURGERY | Facility: CLINIC | Age: 79
End: 2024-09-21

## 2024-10-10 ENCOUNTER — RX RENEWAL (OUTPATIENT)
Age: 79
End: 2024-10-10

## 2024-12-18 ENCOUNTER — APPOINTMENT (OUTPATIENT)
Dept: NEPHROLOGY | Facility: CLINIC | Age: 79
End: 2024-12-18
Payer: COMMERCIAL

## 2024-12-18 VITALS
SYSTOLIC BLOOD PRESSURE: 124 MMHG | HEIGHT: 69 IN | WEIGHT: 176 LBS | HEART RATE: 73 BPM | OXYGEN SATURATION: 98 % | DIASTOLIC BLOOD PRESSURE: 62 MMHG | BODY MASS INDEX: 26.07 KG/M2 | TEMPERATURE: 96.1 F

## 2024-12-18 DIAGNOSIS — N18.30 CHRONIC KIDNEY DISEASE, STAGE 3 UNSPECIFIED: ICD-10-CM

## 2024-12-18 DIAGNOSIS — E21.3 HYPERPARATHYROIDISM, UNSPECIFIED: ICD-10-CM

## 2024-12-18 DIAGNOSIS — K21.9 GASTRO-ESOPHAGEAL REFLUX DISEASE W/OUT ESOPHAGITIS: ICD-10-CM

## 2024-12-18 DIAGNOSIS — I10 ESSENTIAL (PRIMARY) HYPERTENSION: ICD-10-CM

## 2024-12-18 PROCEDURE — G2211 COMPLEX E/M VISIT ADD ON: CPT | Mod: NC

## 2024-12-18 PROCEDURE — 99214 OFFICE O/P EST MOD 30 MIN: CPT

## 2024-12-18 RX ORDER — METHIMAZOLE 5 MG/1
5 TABLET ORAL
Refills: 0 | Status: ACTIVE | COMMUNITY

## 2024-12-19 DIAGNOSIS — R39.9 UNSPECIFIED SYMPTOMS AND SIGNS INVOLVING THE GENITOURINARY SYSTEM: ICD-10-CM

## 2024-12-19 LAB
APPEARANCE: CLEAR
BACTERIA: NEGATIVE /HPF
BILIRUBIN URINE: NEGATIVE
BLOOD URINE: NEGATIVE
CAST: 2 /LPF
COLOR: YELLOW
CREAT SPEC-SCNC: 96 MG/DL
CREAT/PROT UR: 0.3 RATIO
EPITHELIAL CELLS: 0 /HPF
GLUCOSE QUALITATIVE U: NEGATIVE MG/DL
KETONES URINE: NEGATIVE MG/DL
LEUKOCYTE ESTERASE URINE: ABNORMAL
MICROSCOPIC-UA: NORMAL
NITRITE URINE: POSITIVE
PH URINE: 6
PROT UR-MCNC: 26 MG/DL
PROTEIN URINE: 30 MG/DL
RED BLOOD CELLS URINE: 1 /HPF
REVIEW: NORMAL
SPECIFIC GRAVITY URINE: 1.02
UROBILINOGEN URINE: 0.2 MG/DL
WHITE BLOOD CELLS URINE: 130 /HPF

## 2024-12-20 RX ORDER — LEVOFLOXACIN 250 MG/1
250 TABLET, FILM COATED ORAL DAILY
Qty: 5 | Refills: 0 | Status: DISCONTINUED | COMMUNITY
Start: 2024-12-19 | End: 2024-12-20

## 2024-12-20 RX ORDER — CEFUROXIME AXETIL 250 MG/1
250 TABLET ORAL
Qty: 10 | Refills: 0 | Status: ACTIVE | COMMUNITY
Start: 2024-12-20 | End: 1900-01-01

## 2024-12-23 LAB — BACTERIA UR CULT: ABNORMAL

## 2025-01-07 NOTE — ED ADULT TRIAGE NOTE - MODE OF ARRIVAL
Caller: Elliot Jean Baptiste    Relationship: Self    Best call back number: [phone] OR L/M [phone]    Requested Prescriptions:   Requested Prescriptions     Pending Prescriptions Disp Refills    apixaban (ELIQUIS) 5 MG tablet tablet 60 tablet 1     Sig: Take 1 tablet by mouth 2 (Two) Times a Day.        Pharmacy where request should be sent: [id number] - [place], [place] - [address] [place] [address] - [phone] [facility] [phone]      Last office visit with prescribing clinician: Visit date not found   Last telemedicine visit with prescribing clinician: Visit date not found   Next office visit with prescribing clinician: 3/28/2025     Additional details provided by patient: CAN YOU ADD REFILLS TILL HIS NEXT APPT ON   3/28/2025    Does the patient have less than a 3 day supply:  [] Yes  [x] No    Would you like a call back once the refill request has been completed: [] Yes [x] No    If the office needs to give you a call back, can they leave a voicemail: [x] Yes [] No    Kirsten Lim Rep   01/07/25 09:34 EST      [employer]

## 2025-01-28 ENCOUNTER — APPOINTMENT (OUTPATIENT)
Facility: CLINIC | Age: 80
End: 2025-01-28

## 2025-02-07 ENCOUNTER — APPOINTMENT (OUTPATIENT)
Dept: ORTHOPEDIC SURGERY | Facility: CLINIC | Age: 80
End: 2025-02-07

## 2025-02-07 VITALS — BODY MASS INDEX: 25.19 KG/M2 | HEIGHT: 69.5 IN | WEIGHT: 174 LBS

## 2025-02-07 DIAGNOSIS — Z98.890 OTHER SPECIFIED POSTPROCEDURAL STATES: ICD-10-CM

## 2025-02-07 DIAGNOSIS — M16.11 UNILATERAL PRIMARY OSTEOARTHRITIS, RIGHT HIP: ICD-10-CM

## 2025-02-07 DIAGNOSIS — M16.12 UNILATERAL PRIMARY OSTEOARTHRITIS, LEFT HIP: ICD-10-CM

## 2025-02-07 DIAGNOSIS — Z87.448 PERSONAL HISTORY OF OTHER DISEASES OF URINARY SYSTEM: ICD-10-CM

## 2025-02-07 DIAGNOSIS — Z87.81 OTHER SPECIFIED POSTPROCEDURAL STATES: ICD-10-CM

## 2025-02-07 PROCEDURE — 73502 X-RAY EXAM HIP UNI 2-3 VIEWS: CPT

## 2025-02-07 PROCEDURE — 99204 OFFICE O/P NEW MOD 45 MIN: CPT

## 2025-02-07 PROCEDURE — G2211 COMPLEX E/M VISIT ADD ON: CPT | Mod: NC

## 2025-02-07 RX ORDER — NEBIVOLOL 5 MG/1
5 TABLET ORAL
Refills: 0 | Status: ACTIVE | COMMUNITY

## 2025-02-07 RX ORDER — FLUTICASONE PROPIONATE 0.5 MG/G
CREAM TOPICAL
Refills: 0 | Status: ACTIVE | COMMUNITY

## 2025-04-01 NOTE — DISCHARGE NOTE PROVIDER - NSDCCPTREATMENT_GEN_ALL_CORE_FT
PRINCIPAL PROCEDURE  Procedure: ORIF, fracture, femur, intertrochanteric, using Gamma nail  Findings and Treatment: Left       Yes

## 2025-04-07 ENCOUNTER — RX RENEWAL (OUTPATIENT)
Age: 80
End: 2025-04-07

## 2025-04-28 NOTE — ASSESSMENT
[FreeTextEntry1] : 74yo male with post-prandial epigastric pain, belching\par \par He has hx of erosive gastritis and concerned for recurrence or for PUD\par Will check egd Risks and benefits of procedure(s) discussed with patient in detail, including but not limited to, perforation, bleeding, reaction to anesthesia, missed lesions.\par \par Consider change in meds to alternative PPI vs adding carafate\par will also check labs and sono r/o biliary disease done

## 2025-06-02 ENCOUNTER — NON-APPOINTMENT (OUTPATIENT)
Age: 80
End: 2025-06-02

## 2025-06-04 ENCOUNTER — APPOINTMENT (OUTPATIENT)
Dept: NEPHROLOGY | Facility: CLINIC | Age: 80
End: 2025-06-04
Payer: COMMERCIAL

## 2025-06-04 VITALS
HEIGHT: 69.5 IN | HEART RATE: 74 BPM | DIASTOLIC BLOOD PRESSURE: 68 MMHG | WEIGHT: 178 LBS | BODY MASS INDEX: 25.77 KG/M2 | OXYGEN SATURATION: 98 % | SYSTOLIC BLOOD PRESSURE: 120 MMHG | TEMPERATURE: 97.3 F

## 2025-06-04 DIAGNOSIS — N18.30 CHRONIC KIDNEY DISEASE, STAGE 3 UNSPECIFIED: ICD-10-CM

## 2025-06-04 PROCEDURE — 99214 OFFICE O/P EST MOD 30 MIN: CPT

## 2025-06-04 PROCEDURE — G2211 COMPLEX E/M VISIT ADD ON: CPT | Mod: NC

## 2025-06-09 RX ORDER — NEOMYCIN SULFATE, POLYMYXIN B SULFATE AND DEXAMETHASONE 3.5; 10000; 1 MG/ML; [USP'U]/ML; MG/ML
3.5-10000-0.1 SUSPENSION OPHTHALMIC
Qty: 5 | Refills: 0 | Status: ACTIVE | COMMUNITY
Start: 2025-03-12

## 2025-06-09 RX ORDER — TAMSULOSIN HYDROCHLORIDE 0.4 MG/1
0.4 CAPSULE ORAL
Qty: 90 | Refills: 0 | Status: ACTIVE | COMMUNITY
Start: 2025-03-29

## 2025-06-09 RX ORDER — HYDROCHLOROTHIAZIDE 12.5 MG/1
12.5 CAPSULE ORAL
Qty: 180 | Refills: 0 | Status: ACTIVE | COMMUNITY
Start: 2025-04-08

## (undated) DEVICE — VENODYNE/SCD SLEEVE CALF LARGE

## (undated) DEVICE — DRAPE C ARM UNIVERSAL

## (undated) DEVICE — DRAPE LAPAROTOMY TRANSVERSE

## (undated) DEVICE — SOL IRR POUR H2O 1500ML

## (undated) DEVICE — PREP CHLORAPREP HI-LITE ORANGE 26ML

## (undated) DEVICE — PREP BETADINE KIT

## (undated) DEVICE — ELCTR MONOPOLAR STIMULATOR PROBE FLUSH-TIP

## (undated) DEVICE — DRAPE 3/4 SHEET 52X76"

## (undated) DEVICE — DRAIN RESERVOIR FOR JACKSON PRATT 100CC CARDINAL

## (undated) DEVICE — NDL HYPO SAFE 25G X 5/8" (ORANGE)

## (undated) DEVICE — SUT POLYSORB 0 30" GS-12 UNDYED

## (undated) DEVICE — SOL IRR POUR NS 0.9% 500ML

## (undated) DEVICE — LABELS BLANK W PEN

## (undated) DEVICE — SUT VICRYL 3-0 18" TIES UNDYED

## (undated) DEVICE — SUT MONOCRYL 4-0 27" PS-2 UNDYED

## (undated) DEVICE — SUT VICRYL 2-0 18" TIES UNDYED

## (undated) DEVICE — GLV 7 PROTEXIS (WHITE)

## (undated) DEVICE — SUT POLYSORB 1 36" GS-21 UNDYED

## (undated) DEVICE — DRILL BIT STRYKER ORTHO 4.2 X 340MM

## (undated) DEVICE — SUT CHROMIC 3-0 27" SH

## (undated) DEVICE — DRAPE ISOLATION 125X83"

## (undated) DEVICE — DRAIN JACKSON PRATT 7MM FLAT FULL NO TROCAR

## (undated) DEVICE — SOL IRR POUR H2O 500ML

## (undated) DEVICE — VENODYNE/SCD SLEEVE CALF MEDIUM

## (undated) DEVICE — STAPLER SKIN VISI-STAT 35 WIDE

## (undated) DEVICE — PACK HEAD & NECK

## (undated) DEVICE — ELCTR GROUNDING PAD ADULT COVIDIEN

## (undated) DEVICE — SUT POLYSORB 0 30" GS-21 UNDYED

## (undated) DEVICE — POSITIONER STRAP ARMBOARD VELCRO TS-30

## (undated) DEVICE — WARMING BLANKET UPPER ADULT

## (undated) DEVICE — POSITIONER PATIENT SAFETY STRAP 3X60"

## (undated) DEVICE — SUT POLYSORB 2-0 30" GS-11 UNDYED

## (undated) DEVICE — FOLEY TRAY 16FR SURESTEP LTX BG

## (undated) DEVICE — CANISTER SUCTION LID GUARD 3000CC

## (undated) DEVICE — POSITIONER FOAM HEAD CRADLE (PINK)

## (undated) DEVICE — DRSG BENZOIN 0.6CC

## (undated) DEVICE — PROTECTOR HEEL / ELBOW FLUFFY

## (undated) DEVICE — CANISTER DISPOSABLE THIN WALL 3000CC

## (undated) DEVICE — SUT POLYSORB 2-0 30" GS-21 UNDYED

## (undated) DEVICE — SOLIDIFIER CANN EXPRESS 3K